# Patient Record
Sex: MALE | Race: WHITE | NOT HISPANIC OR LATINO | Employment: OTHER | URBAN - METROPOLITAN AREA
[De-identification: names, ages, dates, MRNs, and addresses within clinical notes are randomized per-mention and may not be internally consistent; named-entity substitution may affect disease eponyms.]

---

## 2017-04-18 DIAGNOSIS — F90.9 ADULT ADHD: ICD-10-CM

## 2017-04-18 DIAGNOSIS — F33.2 SEVERE RECURRENT MAJOR DEPRESSION WITHOUT PSYCHOTIC FEATURES (H): Primary | ICD-10-CM

## 2017-04-18 LAB
BASOPHILS # BLD AUTO: 0 10E9/L (ref 0–0.2)
BASOPHILS NFR BLD AUTO: 0.3 %
DIFFERENTIAL METHOD BLD: NORMAL
EOSINOPHIL # BLD AUTO: 0.1 10E9/L (ref 0–0.7)
EOSINOPHIL NFR BLD AUTO: 2.1 %
ERYTHROCYTE [DISTWIDTH] IN BLOOD BY AUTOMATED COUNT: 13.4 % (ref 10–15)
HCT VFR BLD AUTO: 45.6 % (ref 40–53)
HGB BLD-MCNC: 15.9 G/DL (ref 13.3–17.7)
LYMPHOCYTES # BLD AUTO: 2.1 10E9/L (ref 0.8–5.3)
LYMPHOCYTES NFR BLD AUTO: 35.7 %
MCH RBC QN AUTO: 30.8 PG (ref 26.5–33)
MCHC RBC AUTO-ENTMCNC: 34.9 G/DL (ref 31.5–36.5)
MCV RBC AUTO: 88 FL (ref 78–100)
MONOCYTES # BLD AUTO: 0.6 10E9/L (ref 0–1.3)
MONOCYTES NFR BLD AUTO: 9.6 %
NEUTROPHILS # BLD AUTO: 3.1 10E9/L (ref 1.6–8.3)
NEUTROPHILS NFR BLD AUTO: 52.3 %
PLATELET # BLD AUTO: 238 10E9/L (ref 150–450)
RBC # BLD AUTO: 5.17 10E12/L (ref 4.4–5.9)
WBC # BLD AUTO: 5.8 10E9/L (ref 4–11)

## 2017-04-18 PROCEDURE — 80053 COMPREHEN METABOLIC PANEL: CPT | Performed by: PSYCHIATRY & NEUROLOGY

## 2017-04-18 PROCEDURE — 85025 COMPLETE CBC W/AUTO DIFF WBC: CPT | Performed by: PSYCHIATRY & NEUROLOGY

## 2017-04-18 PROCEDURE — 80061 LIPID PANEL: CPT | Performed by: PSYCHIATRY & NEUROLOGY

## 2017-04-18 PROCEDURE — 36415 COLL VENOUS BLD VENIPUNCTURE: CPT | Performed by: PSYCHIATRY & NEUROLOGY

## 2017-04-18 PROCEDURE — 80156 ASSAY CARBAMAZEPINE TOTAL: CPT | Performed by: PSYCHIATRY & NEUROLOGY

## 2017-04-19 LAB
ALBUMIN SERPL-MCNC: 3.7 G/DL (ref 3.4–5)
ALP SERPL-CCNC: 83 U/L (ref 40–150)
ALT SERPL W P-5'-P-CCNC: 36 U/L (ref 0–70)
ANION GAP SERPL CALCULATED.3IONS-SCNC: 11 MMOL/L (ref 3–14)
AST SERPL W P-5'-P-CCNC: 19 U/L (ref 0–45)
BILIRUB SERPL-MCNC: 0.3 MG/DL (ref 0.2–1.3)
BUN SERPL-MCNC: 14 MG/DL (ref 7–30)
CALCIUM SERPL-MCNC: 9.1 MG/DL (ref 8.5–10.1)
CARBAMAZEPINE SERPL-MCNC: 6.8 MG/L (ref 4–12)
CHLORIDE SERPL-SCNC: 110 MMOL/L (ref 94–109)
CHOLEST SERPL-MCNC: 222 MG/DL
CO2 SERPL-SCNC: 23 MMOL/L (ref 20–32)
CREAT SERPL-MCNC: 1.12 MG/DL (ref 0.66–1.25)
GFR SERPL CREATININE-BSD FRML MDRD: 76 ML/MIN/1.7M2
GLUCOSE SERPL-MCNC: 100 MG/DL (ref 70–99)
HDLC SERPL-MCNC: 61 MG/DL
LDLC SERPL CALC-MCNC: 133 MG/DL
NONHDLC SERPL-MCNC: 161 MG/DL
POTASSIUM SERPL-SCNC: 4.2 MMOL/L (ref 3.4–5.3)
PROT SERPL-MCNC: 7.1 G/DL (ref 6.8–8.8)
SODIUM SERPL-SCNC: 144 MMOL/L (ref 133–144)
TRIGL SERPL-MCNC: 138 MG/DL

## 2017-09-08 DIAGNOSIS — N40.1 BENIGN NON-NODULAR PROSTATIC HYPERPLASIA WITH LOWER URINARY TRACT SYMPTOMS: Primary | ICD-10-CM

## 2017-09-08 RX ORDER — TAMSULOSIN HYDROCHLORIDE 0.4 MG/1
0.4 CAPSULE ORAL DAILY
Qty: 90 CAPSULE | Refills: 0 | Status: SHIPPED | OUTPATIENT
Start: 2017-09-08 | End: 2019-07-02

## 2017-09-08 NOTE — TELEPHONE ENCOUNTER
Signed Prescriptions:                        Disp   Refills    tamsulosin (FLOMAX) 0.4 MG capsule         90 cap*0        Sig: Take 1 capsule (0.4 mg) by mouth daily  Authorizing Provider: PRIYANKA RODRIGUEZ  Ordering User: FLORINDA STARR RN

## 2018-02-21 ENCOUNTER — OFFICE VISIT (OUTPATIENT)
Dept: FAMILY MEDICINE | Facility: CLINIC | Age: 33
End: 2018-02-21
Payer: MEDICAID

## 2018-02-21 VITALS
BODY MASS INDEX: 30.05 KG/M2 | DIASTOLIC BLOOD PRESSURE: 60 MMHG | WEIGHT: 163.3 LBS | OXYGEN SATURATION: 99 % | TEMPERATURE: 98.3 F | HEIGHT: 62 IN | HEART RATE: 125 BPM | SYSTOLIC BLOOD PRESSURE: 90 MMHG

## 2018-02-21 DIAGNOSIS — Z00.01 ENCOUNTER FOR GENERAL ADULT MEDICAL EXAMINATION WITH ABNORMAL FINDINGS: Primary | ICD-10-CM

## 2018-02-21 DIAGNOSIS — H10.9 CONJUNCTIVITIS OF BOTH EYES, UNSPECIFIED CONJUNCTIVITIS TYPE: ICD-10-CM

## 2018-02-21 DIAGNOSIS — N50.812 TESTICULAR PAIN, LEFT: ICD-10-CM

## 2018-02-21 PROCEDURE — 99395 PREV VISIT EST AGE 18-39: CPT | Performed by: FAMILY MEDICINE

## 2018-02-21 PROCEDURE — 99213 OFFICE O/P EST LOW 20 MIN: CPT | Mod: 25 | Performed by: FAMILY MEDICINE

## 2018-02-21 RX ORDER — HYDROXYZINE PAMOATE 50 MG/1
CAPSULE ORAL
Refills: 3 | COMMUNITY
Start: 2018-02-13 | End: 2022-08-23

## 2018-02-21 RX ORDER — ALFUZOSIN HYDROCHLORIDE 10 MG/1
TABLET, EXTENDED RELEASE ORAL
COMMUNITY
Start: 2018-02-18 | End: 2020-04-24

## 2018-02-21 RX ORDER — NEOMYCIN SULFATE, POLYMYXIN B SULFATE AND DEXAMETHASONE 3.5; 10000; 1 MG/ML; [USP'U]/ML; MG/ML
1 SUSPENSION/ DROPS OPHTHALMIC 4 TIMES DAILY
Qty: 1 BOTTLE | Refills: 0 | Status: SHIPPED | OUTPATIENT
Start: 2018-02-21 | End: 2020-04-24

## 2018-02-21 NOTE — NURSING NOTE
"Chief Complaint   Patient presents with     Physical     male health care        Initial BP 90/60 (BP Location: Right arm, Patient Position: Chair, Cuff Size: Adult Large)  Pulse 125  Temp 98.3  F (36.8  C) (Temporal)  Ht 5' 2\" (1.575 m)  Wt 163 lb 4.8 oz (74.1 kg)  SpO2 99%  BMI 29.87 kg/m2 Estimated body mass index is 29.87 kg/(m^2) as calculated from the following:    Height as of this encounter: 5' 2\" (1.575 m).    Weight as of this encounter: 163 lb 4.8 oz (74.1 kg).  Medication Reconciliation: complete     "

## 2018-02-21 NOTE — PROGRESS NOTES
"SUBJECTIVE:   CC: Brian Ortiz is an 32 year old male who presents for preventative health visit.     HPI  {Outside tests to abstract? :635072}    {additional problems to add (Optional):725480}    Today's PHQ-2 Score:   PHQ-2 ( 1999 Pfizer) 2/27/2015   Q1: Little interest or pleasure in doing things 2   Q2: Feeling down, depressed or hopeless 3   PHQ-2 Score 5       Abuse: Current or Past(Physical, Sexual or Emotional)- {YES/NO/NA:548969}  Do you feel safe in your environment - {YES/NO/NA:176913}    Social History   Substance Use Topics     Smoking status: Never Smoker     Smokeless tobacco: Never Used     Alcohol use No     No flowsheet data found.{add AUDIT responses (Optional) (A score of 7 for adult men is an indication of hazardous drinking; a score of 8 or more is an indication of an alcohol use disorder.  A score of 7 or more for adult women is an indication of hazardous drinking or an alchohol use disorder):155797}    Last PSA: No results found for: PSA    Reviewed orders with patient. Reviewed health maintenance and updated orders accordingly - {Yes/No:322517::\"Yes\"}  {Chronicprobdata (Optional):632306}    Reviewed and updated as needed this visit by clinical staff         Reviewed and updated as needed this visit by Provider        {HISTORY OPTIONS (Optional):413409}    Review of Systems  {MALE ROS-adult preventive care package:476185::\"C: NEGATIVE for fever, chills, change in weight\",\"I: NEGATIVE for worrisome rashes, moles or lesions\",\"E: NEGATIVE for vision changes or irritation\",\"ENT: NEGATIVE for ear, mouth and throat problems\",\"R: NEGATIVE for significant cough or SOB\",\"CV: NEGATIVE for chest pain, palpitations or peripheral edema\",\"GI: NEGATIVE for nausea, abdominal pain, heartburn, or change in bowel habits\",\" male: negative for dysuria, hematuria, decreased urinary stream, erectile dysfunction, urethral discharge\",\"M: NEGATIVE for significant arthralgias or myalgia\",\"N: NEGATIVE for " "weakness, dizziness or paresthesias\",\"P: NEGATIVE for changes in mood or affect\"}    OBJECTIVE:   There were no vitals taken for this visit.    Physical Exam  {Exam Choices:648256}    ASSESSMENT/PLAN:   {Diag Picklist:922289}    COUNSELING:   {MALE COUNSELING MESSAGES:031071::\"Reviewed preventive health counseling, as reflected in patient instructions\"}    {BP Counseling- Complete if BP >= 120/80  (Optional):819557}     reports that he has never smoked. He has never used smokeless tobacco.  {Tobacco Cessation -- Complete if patient is a smoker (Optional):980164}  Estimated body mass index is 28.53 kg/(m^2) as calculated from the following:    Height as of 11/18/15: 5' 2\" (1.575 m).    Weight as of 8/31/16: 156 lb (70.8 kg).   {Weight Management Plan (ACO) Complete if BMI is abnormal-  Ages 18-64  BMI >24.9.  Age 65+ with BMI <23 or >30 (Optional):491029}    Counseling Resources:  ATP IV Guidelines  Pooled Cohorts Equation Calculator  FRAX Risk Assessment  ICSI Preventive Guidelines  Dietary Guidelines for Americans, 2010  USDA's MyPlate  ASA Prophylaxis  Lung CA Screening    Duy Canseco MD  [facility]  "

## 2018-02-21 NOTE — PROGRESS NOTES
"  SUBJECTIVE:   CC: Brian Ortiz is an 32 year old male who presents for preventative health visit.     #1-Mattery eyes since before [place]. Patient states that he wakes up with both of his eyes crusted over.    #2-C/o pain in left testicle. Denies swelling. States that it has been present for \"years.\"     #3-Zit on top of head.     #4-Patient would like to be placed back on his tamsulosin. Doesn't care for the alfuzosin.     Healthy Habits:    Do you get at least three servings of calcium containing foods daily (dairy, green leafy vegetables, etc.)? yes    Amount of exercise or daily activities, outside of work: none    Problems taking medications regularly No    Medication side effects: No    Have you had an eye exam in the past two years? no    Do you see a dentist twice per year? no    Do you have sleep apnea, excessive snoring or daytime drowsiness?occasonal day time drowsiness.        As above    Today's PHQ-2 Score:   PHQ-2 ( 1999 Pfizer) 2/21/2018 2/27/2015   Q1: Little interest or pleasure in doing things 2 2   Q2: Feeling down, depressed or hopeless 2 3   PHQ-2 Score 4 5       Abuse: Current or Past(Physical, Sexual or Emotional)- No   Do you feel safe in your environment - Yes    Social History   Substance Use Topics     Smoking status: Never Smoker     Smokeless tobacco: Never Used     Alcohol use No      If you drink alcohol do you typically have >3 drinks per day or >7 drinks per week? Not Applicable                      Last PSA: No results found for: PSA    Reviewed orders with patient. 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 Tubes     SURGICAL HISTORY OF -   08/10/2004    Madibular midline ostectom;y with anterior ilac crest bone graft.  Bilateral sagittal split ramus osteotomy.  [facility]       ROS:  C: NEGATIVE for fever, chills, change in weight  I: NEGATIVE for worrisome rashes, moles or lesions  EYES: Complains of crusting in the morning.  No visual disturbance little bit of itching.  ENT: NEGATIVE for ear, mouth and throat problems  R: NEGATIVE for significant cough or SOB  CV: NEGATIVE for chest pain, palpitations or peripheral edema  GI: NEGATIVE for nausea, abdominal pain, heartburn, or change in bowel habits   male: Complains of left testicular pain.  Present for years.  No dysuria.  No known injury.  M: NEGATIVE for significant arthralgias or myalgia  N: NEGATIVE for weakness, dizziness or paresthesias  P: NEGATIVE for changes in mood or affect    OBJECTIVE:   There were no vitals taken for this visit.  EXAM:  GENERAL: healthy, alert and in facies a bit different  EYES: PERRL, EOMI and slight conjunctival injection  HENT: nose and mouth without ulcers or lesions, oropharynx clear, oral mucous membranes moist and hearing aids bilaterally  NECK: no adenopathy, no asymmetry, masses, or scars and thyroid normal to palpation  RESP: lungs clear to auscultation - no rales, rhonchi or wheezes  CV: regular rate and rhythm, normal S1 S2, no S3 or S4, no murmur, click or rub, no peripheral edema and peripheral pulses strong  ABDOMEN: soft, nontender, no hepatosplenomegaly, no masses and bowel sounds normal   (male): normal male genitalia without lesions or urethral discharge, no hernia  MS:  "no gross musculoskeletal defects noted, no edema  SKIN: no suspicious lesions or rashes  NEURO: Normal strength and tone, mentation intact and speech normal  PSYCH: affect normal/bright and mentally challenged just slightly.    ASSESSMENT/PLAN:   1. Encounter for general adult medical examination with abnormal findings  See below.    2. Conjunctivitis of both eyes, unspecified conjunctivitis type  We will try some Maxitrol eyedrops.  - neomycin-polymyxin-dexamethasone (MAXITROL) 3.5-35160-4.1 SUSP ophthalmic susp; Place 1 drop into both eyes 4 times daily  Dispense: 1 Bottle; Refill: 0    3. Testicular pain, left  He sees a urologist for urinary frequency and hesitation.  We will have him follow-up with him because he wants to change medication and he can bring up this testicular pain as well.  He is pleased with this.      COUNSELING:  Reviewed preventive health counseling, as reflected in patient instructions       Regular exercise       Healthy diet/nutrition       Vision screening       reports that he has never smoked. He has never used smokeless tobacco.    Estimated body mass index is 28.53 kg/(m^2) as calculated from the following:    Height as of 11/18/15: 5' 2\" (1.575 m).    Weight as of 8/31/16: 156 lb (70.8 kg).       Counseling Resources:  ATP IV Guidelines  Pooled Cohorts Equation Calculator  FRAX Risk Assessment  ICSI Preventive Guidelines  Dietary Guidelines for Americans, 2010  USDA's MyPlate  ASA Prophylaxis  Lung CA Screening    Duy Canseco MD  [facility]  "

## 2018-02-21 NOTE — MR AVS SNAPSHOT
After Visit Summary   2/21/2018    Brian Ortiz    MRN: [id number]           Patient Information     Date Of Birth          1985        Visit Information        Provider Department      2/21/2018 2:20 PM Duy Canseco MD [facility]        Today's Diagnoses     Encounter for general adult medical examination with abnormal findings    -  1    Conjunctivitis of both eyes, unspecified conjunctivitis type        Testicular pain, left          Care Instructions      Preventive Health Recommendations  Male Ages 26 - 39    Yearly exam:             See your health care provider every year in order to  o   Review health changes.   o   Discuss preventive care.    o   Review your medicines if your doctor has prescribed any.    You should be tested each year for STDs (sexually transmitted diseases), if you re at risk.     After age 35, talk to your provider about cholesterol testing. If you are at risk for heart disease, have your cholesterol tested at least every 5 years.     If you are at risk for diabetes, you should have a diabetes test (fasting glucose).  Shots: Get a flu shot each year. Get a tetanus shot every 10 years.     Nutrition:    Eat at least 5 servings of fruits and vegetables daily.     Eat whole-grain bread, whole-wheat pasta and brown rice instead of white grains and rice.     Talk to your provider about Calcium and Vitamin D.     Lifestyle    Exercise for at least 150 minutes a week (30 minutes a day, 5 days a week). This will help you control your weight and prevent disease.     Limit alcohol to one drink per day.     No smoking.     Wear sunscreen to prevent skin cancer.     See your dentist every six months for an exam and cleaning.             Follow-ups after your visit        Who to contact     If you have questions or need follow up information about today's clinic visit or your schedule please contact [facility] directly at  "[phone].  Normal or non-critical lab and imaging results will be communicated to you by [email or web address], letter or phone within 4 business days after the clinic has received the results. If you do not hear from us within 7 days, please contact the clinic through [employer] or phone. If you have a critical or abnormal lab result, we will notify you by phone as soon as possible.  Submit refill requests through [employer] or call your pharmacy and they will forward the refill request to us. Please allow 3 business days for your refill to be completed.          Additional Information About Your Visit        [email or web address] Information     [employer] gives you secure access to your electronic health record. If you see a primary care provider, you can also send messages to your care team and make appointments. If you have questions, please call your primary care clinic.  If you do not have a primary care provider, please call [phone] and they will assist you.        Care EveryWhere ID     This is your Care EveryWhere ID. This could be used by other organizations to access your [facility] medical records  [id number]        Your Vitals Were     Pulse Temperature Height Pulse Oximetry BMI (Body Mass Index)       125 98.3  F (36.8  C) (Temporal) 5' 2\" (1.575 m) 99% 29.87 kg/m2        Blood Pressure from Last 3 Encounters:   02/21/18 90/60   11/09/16 108/70   06/01/16 134/69    Weight from Last 3 Encounters:   02/21/18 163 lb 4.8 oz (74.1 kg)   08/31/16 156 lb (70.8 kg)   11/18/15 157 lb (71.2 kg)              We Performed the Following     OFFICE/OUTPT VISIT,EST,LEVL III          Today's Medication Changes          These changes are accurate as of 2/21/18  4:20 PM.  If you have any questions, ask your nurse or doctor.               Start taking these medicines.        Dose/Directions    neomycin-polymyxin-dexamethasone 3.5-49874-6.1 Susp ophthalmic susp   Commonly known as:  MAXITROL   Used for:  Conjunctivitis of both eyes, unspecified " conjunctivitis type   Started by:  Duy Canseco MD        Dose:  1 drop   Place 1 drop into both eyes 4 times daily   Quantity:  1 Bottle   Refills:  0            Where to get your medicines      These medications were sent to [id number] - [place], [place] - [address]  [address], [facility] [address]    Hours:  [facility] [place] - numbers unchanged   9/8/03 kr Phone:  [phone]     neomycin-polymyxin-dexamethasone 3.5-31462-1.1 Susp ophthalmic susp                Primary Care Provider Office Phone # Fax #    Duy Canseco [phone]       [facility] [address] [place] [address]        Equal Access to Services     MARY CUENCA : Hadii yenny ku hadasho Soomaali, waaxda luqadaha, qaybta kaalmada adeegyada, waxay joannain haydana damon . So [facility] [phone].    ATENCIÓN: Si habla español, tiene a li disposición servicios gratuitos de asistencia lingüística. Llame al [phone].    We comply with applicable federal civil rights laws and [place] laws. We do not discriminate on the basis of race, color, national origin, age, disability, sex, sexual orientation, or gender identity.            Thank you!     Thank you for choosing [facility]  for your care. Our goal is always to provide you with excellent care. Hearing back from our patients is one way we can continue to improve our services. Please take a few minutes to complete the written survey that you may receive in the mail after your visit with us. Thank you!             Your Updated Medication List - Protect others around you: Learn how to safely use, store and throw away your medicines at www.disposemymeds.org.          This list is accurate as of 2/21/18  4:20 PM.  Always use your most recent med list.                   Brand Name Dispense Instructions for use Diagnosis    alfuzosin 10 MG 24 hr tablet    UROXATRAL          * buPROPion 100 MG tablet    WELLBUTRIN    120  tablet    Take 1 tablet (100 mg) by mouth 2 times daily    Multiple congenital malformations, not elsewhere classified       * buPROPion 100 MG 12 hr tablet    WELLBUTRIN SR    60 tablet    TAKE ONE TABLET BY MOUTH TWICE A DAY EVERY 12 HOURS    Multiple congenital malformations, not elsewhere classified       hydrOXYzine 50 MG capsule    VISTARIL     TAKE ONE CAPSULE BY MOUTH TWICE A DAY AS NEEDED        IBUPROFEN PO      Take 400 mg by mouth every 8 hours as needed for moderate pain (headache)        neomycin-polymyxin-dexamethasone 3.5-79934-6.1 Susp ophthalmic susp    MAXITROL    1 Bottle    Place 1 drop into both eyes 4 times daily    Conjunctivitis of both eyes, unspecified conjunctivitis type       QUEtiapine 50 MG tablet    SEROQUEL    120 tablet    Take 1 tablet (50 mg) by mouth 2 times daily    Multiple congenital malformations, not elsewhere classified       STRATTERA 60 MG capsule   Generic drug:  atomoxetine     30    1 CAPSULE EVERY MORNING        tamsulosin 0.4 MG capsule    FLOMAX    90 capsule    Take 1 capsule (0.4 mg) by mouth daily    Benign non-nodular prostatic hyperplasia with lower urinary tract symptoms       * TEGRETOL 100 MG chewable tablet   Generic drug:  carBAMazepine     1 tablet    Take tablet daily. Unknown dose.        * carBAMazepine 200 MG 12 hr tablet    TEGretol XR    60 tablet    TAKE ONE TABLET BY MOUTH TWICE A DAY    Multiple congenital malformations, not elsewhere classified       * Notice:  This list has 4 medication(s) that are the same as other medications prescribed for you. Read the directions carefully, and ask your doctor or other care provider to review them with you.

## 2018-02-22 ASSESSMENT — PATIENT HEALTH QUESTIONNAIRE - PHQ9: SUM OF ALL RESPONSES TO PHQ QUESTIONS 1-9: 18

## 2019-07-02 ENCOUNTER — NURSE TRIAGE (OUTPATIENT)
Dept: NURSING | Facility: CLINIC | Age: 34
End: 2019-07-02

## 2019-07-02 ENCOUNTER — HOSPITAL ENCOUNTER (EMERGENCY)
Facility: CLINIC | Age: 34
Discharge: HOME | End: 2019-07-02
Attending: FAMILY MEDICINE | Admitting: FAMILY MEDICINE
Payer: MEDICAID

## 2019-07-02 VITALS
BODY MASS INDEX: 30.36 KG/M2 | SYSTOLIC BLOOD PRESSURE: 112 MMHG | HEART RATE: 96 BPM | OXYGEN SATURATION: 100 % | RESPIRATION RATE: 16 BRPM | DIASTOLIC BLOOD PRESSURE: 90 MMHG | WEIGHT: 166 LBS | TEMPERATURE: 98.2 F

## 2019-07-02 DIAGNOSIS — N40.1 BENIGN NON-NODULAR PROSTATIC HYPERPLASIA WITH LOWER URINARY TRACT SYMPTOMS: ICD-10-CM

## 2019-07-02 DIAGNOSIS — R39.15 URINARY URGENCY: ICD-10-CM

## 2019-07-02 DIAGNOSIS — R35.0 URINARY FREQUENCY: ICD-10-CM

## 2019-07-02 LAB
ALBUMIN UR-MCNC: NEGATIVE MG/DL
APPEARANCE UR: ABNORMAL
BILIRUB UR QL STRIP: NEGATIVE
COLOR UR AUTO: YELLOW
GLUCOSE UR STRIP-MCNC: 50 MG/DL
HGB UR QL STRIP: NEGATIVE
KETONES UR STRIP-MCNC: NEGATIVE MG/DL
LEUKOCYTE ESTERASE UR QL STRIP: NEGATIVE
MUCOUS THREADS #/AREA URNS LPF: PRESENT /LPF
NITRATE UR QL: NEGATIVE
PH UR STRIP: 5 PH (ref 5–7)
RBC #/AREA URNS AUTO: <1 /HPF (ref 0–2)
SOURCE: ABNORMAL
SP GR UR STRIP: 1.02 (ref 1–1.03)
SPERM #/AREA URNS HPF: PRESENT /HPF
UROBILINOGEN UR STRIP-MCNC: 0 MG/DL (ref 0–2)
WBC #/AREA URNS AUTO: 1 /HPF (ref 0–5)

## 2019-07-02 PROCEDURE — 99284 EMERGENCY DEPT VISIT MOD MDM: CPT | Mod: 25 | Performed by: FAMILY MEDICINE

## 2019-07-02 PROCEDURE — 99284 EMERGENCY DEPT VISIT MOD MDM: CPT | Mod: Z6 | Performed by: FAMILY MEDICINE

## 2019-07-02 PROCEDURE — 81001 URINALYSIS AUTO W/SCOPE: CPT | Performed by: FAMILY MEDICINE

## 2019-07-02 PROCEDURE — 51798 US URINE CAPACITY MEASURE: CPT | Performed by: FAMILY MEDICINE

## 2019-07-02 RX ORDER — TAMSULOSIN HYDROCHLORIDE 0.4 MG/1
0.4 CAPSULE ORAL DAILY
Qty: 30 CAPSULE | Refills: 0 | Status: SHIPPED | OUTPATIENT
Start: 2019-07-02 | End: 2022-08-23

## 2019-07-02 NOTE — ED AVS SNAPSHOT
[facility]  [address] [place] [address]  Phone:  [phone]  Fax:  [phone]                                    Brian Ortiz   MRN: [id number]    Department:  [facility]   Date of Visit:  7/2/2019           After Visit Summary Signature Page    I have received my discharge instructions, and my questions have been answered. I have discussed any challenges I see with this plan with the nurse or doctor.    ..........................................................................................................................................  Patient/Patient Representative Signature      ..........................................................................................................................................  Patient Representative Print Name and Relationship to Patient    ..................................................               ................................................  Date                                   Time    ..........................................................................................................................................  Reviewed by Signature/Title    ...................................................              ..............................................  Date                                               Time          22EPIC Rev 08/18

## 2019-07-03 NOTE — ED TRIAGE NOTES
"Patient answered yes to feeling down, depressed or hopeless and he has thoughts \"every day\" of killing himself. His last suicide attempt was 2016. He has a therapist and a psychiatrist.  "

## 2019-07-03 NOTE — ED PROVIDER NOTES
"  History     Chief Complaint   Patient presents with     Dysuria     HPI  Brian Ortiz is a 33 year old male who presents to the ED with a 2-month history of urinary urgency.  He feels like he does not completely empty his bladder and does have urinary frequency.  He has had to have urological procedures in the past sounds like cystocopy, not sure if he had any dilatations but has been putting off going back.  There is usually something better to do on any given day then to have his penis instrumented.  Also has had kidney stones but is also always been able to pass them on his own.  Every couple of months he will hear a \"kink\" in the toilet.  Does not sound like he has true dysuria but he does have discomfort while urinating and during midstream will get an even stronger urge area denies any fevers chills or sweats.    Also had some itching in his left flank in the region of his solitary kidney.  He states that it feels like the \"epicenter\" of the itching is deep inside under his ribs near his kidney.  He has not noticed any rash in this area.  He did have chickenpox as a kid.    When asked in triage, he did admit to passive, fleeting thoughts of the concept of suicide but no active suicidal ideation or plan.  He saw his therapist yesterday and has an appointment with his medical psychiatrist tomorrow.  He is not worried about this and this is nothing new.    Allergies:  Allergies   Allergen Reactions     No Known Drug Allergies        Problem List:    Patient Active Problem List    Diagnosis Date Noted     Benign non-nodular prostatic hyperplasia with lower urinary tract symptoms 08/31/2016     Priority: Medium     Hearing decreased 04/22/2015     Priority: Medium     BPPV (benign paroxysmal positional vertigo) 04/22/2015     Priority: Medium     Anal fissure 05/06/2011     Priority: Medium     Acne 05/06/2011     Priority: Medium     CARDIOVASCULAR SCREENING; LDL GOAL LESS THAN 160 10/31/2010     Priority: " Medium     Constipation 01/09/2008     Priority: Medium     Problem list name updated by automated process. Provider to review       Scoliosis associated with other condition 01/14/2005     Priority: Medium     Hearing loss 01/14/2005     Priority: Medium     Problem list name updated by automated process. Provider to review       Multiple congenital malformations, not elsewhere classified 01/14/2005     Priority: Medium     Diagnosis updated by automated process. Provider to review and confirm.          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 Tubes     SURGICAL HISTORY OF -   08/10/2004    Madibular midline ostectom;y with anterior ilac crest bone graft.  Bilateral sagittal split ramus osteotomy.  [facility]       Family History:    Family History   Adopted: Yes   Problem Relation Age of Onset     Substance Abuse Mother      Substance Abuse Father        Social History:  Marital Status:  Single [1]  Social History     Tobacco Use     Smoking status: Never Smoker     Smokeless tobacco: Never Used   Substance Use Topics     Alcohol use: No     Alcohol/week: 0.0 oz     Drug use: No        Medications:      tamsulosin (FLOMAX) 0.4 MG capsule   alfuzosin (UROXATRAL) 10 MG 24 hr tablet   buPROPion (WELLBUTRIN SR) 100 MG 12 hr tablet   buPROPion (WELLBUTRIN) 100 MG tablet   carBAMazepine (TEGRETOL XR) 200 MG 12 hr tablet   carBAMazepine (TEGRETOL) 100 MG chewable tablet   hydrOXYzine (VISTARIL) 50 MG capsule   IBUPROFEN PO   neomycin-polymyxin-dexamethasone (MAXITROL) 3.5-20293-1.1  SUSP ophthalmic susp   QUEtiapine (SEROQUEL) 50 MG tablet   STRATTERA 60 MG OR CAPS         Review of Systems   All other systems reviewed and are negative.      Physical Exam   BP: 112/90  Pulse: 127  Temp: 98.2  F (36.8  C)  Resp: 16  Weight: 75.3 kg (166 lb)  SpO2: 100 %      Physical Exam   Constitutional: He is oriented to person, place, and time. He appears well-developed and well-nourished. No distress.   HENT:   Hearing Aid on left     Pulmonary/Chest: Effort normal. No respiratory distress.   Abdominal: There is no CVA tenderness.   Neurological: He is alert and oriented to person, place, and time.   Skin: Skin is warm and dry. No rash noted.       ED Course  (with Medical Decision Making)    33-year-old with solitary kidney from a birth.  Has had kidney stones and sounds like he has had cystoscopy in the past.  Has had urinary frequency and incomplete emptying over the past couple of months.  No fevers chills or sweats.      UA was clear.  He tells me he was previously on Flomax it worked very well but his prescription ran out any has not been back in to see his urologist to get it filled.  I told him I would give him a 30-day supply which should give him enough time to get him to see his urologist and incentive to do so.  We discussed reportable signs and when to return.  He is comfortable with this plan.          Procedures               Critical Care time:  none               Results for orders placed or performed during the hospital encounter of 07/02/19 (from the past 24 hour(s))   UA with Microscopic   Result Value Ref Range    Color Urine Yellow     Appearance Urine Slightly Cloudy     Glucose Urine 50 (A) NEG^Negative mg/dL    Bilirubin Urine Negative NEG^Negative    Ketones Urine Negative NEG^Negative mg/dL    Specific Gravity Urine 1.023 1.003 - 1.035    Blood Urine Negative NEG^Negative    pH Urine 5.0 5.0 - 7.0 pH    Protein Albumin Urine Negative NEG^Negative mg/dL    Urobilinogen mg/dL 0.0  0.0 - 2.0 mg/dL    Nitrite Urine Negative NEG^Negative    Leukocyte Esterase Urine Negative NEG^Negative    Source Midstream Urine     WBC Urine 1 0 - 5 /HPF    RBC Urine <1 0 - 2 /HPF    Mucous Urine Present (A) NEG^Negative /LPF    sperm Present (A) NEG^Negative /HPF       Medications - No data to display    Assessments & Plan     I have reviewed the nursing notes.    I have reviewed the findings, diagnosis, plan and need for follow up with the patient.          Medication List      There are no discharge medications for this visit.         Final diagnoses:   Urinary frequency   Urinary urgency       7/2/2019   [facility] EMERGENCY DEPARTMENT     Jesus Smith MD  07/03/19 0107       Jesus Smith MD  07/03/19 [id number]

## 2019-07-03 NOTE — DISCHARGE INSTRUCTIONS
Since the Flomax worked well for you in the past, we will will go ahead and restart that.  You can take 0.4 mg at bedtime.  I gave you 30-day supply.  That should give you time to get in to see your urologist for follow-up.  Your urine was clear tonight and showed no signs of infection.  It was a pleasure visiting with you and your father tonight.  I hope this settles down quickly for you.  If you develop a blistery rash in the area of itching, please return as you could have early shingles.    Thank you for choosing [facility]. We appreciate the opportunity to meet your urgent medical needs. Please let us know if we could have done anything to make your stay more satisfying.    After discharge, please closely monitor for any new or worsening symptoms. Return to the Emergency Department if you develop any acute worsening signs or symptoms.    If you had lab work, cultures or imaging studies done during your stay, the final results may still be pending. We will call you if your plan of care needs to change. However, if you are not improving as expected, please follow up with your primary care provider or clinic.     Start any prescription medications that were prescribed to you and take them as directed.     Please see additional handouts that may be pertinent to your condition.

## 2019-07-03 NOTE — TELEPHONE ENCOUNTER
"Patient's \"kidney\" is bothering him 2/10 and has a \"deep itching.\"  Also having pain when urinates off and on.  Guidelines say should see provider within four hours.  Patient does not have a ride, but will try to find one.    Huong Núñez RN  [facility]      Reason for Disposition    Pain or burning with urination    Additional Information    Negative: Passed out (i.e., lost consciousness, collapsed and was not responding)    Negative: Shock suspected (e.g., cold/pale/clammy skin, too weak to stand, low BP, rapid pulse)    Negative: Difficult to awaken or acting confused (e.g., disoriented, slurred speech)    Negative: Sounds like a life-threatening emergency to the triager    Negative: [1] SEVERE pain (e.g., excruciating, scale 8-10) AND [2] present > 1 hour    Negative: [1] SEVERE pain (e.g., excruciating, scale 8-10) AND [2] not improved after pain medicine    Negative: [1] Sudden onset of severe flank pain AND [2] age > 60    Negative: [1] Abdominal pain AND [2] age > 60    Negative: [1] Unable to urinate (or only a few drops) > 4 hours AND     [2] bladder feels very full (e.g., palpable bladder or strong urge to urinate)    Negative: Vomiting    Negative: Weakness of a leg or foot (e.g., unable to bear weight, dragging foot)    Negative: Patient sounds very sick or weak to the triager    Negative: Fever > 100.5 F (38.1 C)    Protocols used: FLANK PAIN-A-AH      "

## 2019-07-03 NOTE — ED TRIAGE NOTES
Patient states he has had painful urination, difficulty starting a stream, and incomplete emptying x2 months. Today has flank pain. He hasn't seen his urologist x2 years. He is concerned because he has 1 kidney.

## 2019-09-12 ENCOUNTER — NURSE TRIAGE (OUTPATIENT)
Dept: FAMILY MEDICINE | Facility: CLINIC | Age: 34
End: 2019-09-12

## 2019-09-12 NOTE — TELEPHONE ENCOUNTER
Reason for call:  Patient reporting a symptom    Symptom or request: pt stated when he blows his nose he has blood on the kleenex    Duration (how long have symptoms been present): 2 wks    Have you been treated for this before? No    Additional comments: pt doesn't want to make an appt.. (Pt is chuck'd for a pe 9/23 with [facility].) Pt is looking for home remedies at this time.    Phone Number patient can be reached at:  Home number on file [phone] (home)    Best Time:      Can we leave a detailed message on this number:  YES    Call taken on 9/12/2019 at 3:08 PM by Anitha Branham

## 2019-09-12 NOTE — TELEPHONE ENCOUNTER
"S-(situation): pt wondering if needs to be concerned about seeing some blood on his kleenex tissue a couple of times this past 2 weeks?    B-(background): He does has seasonal allergies issues. He takes an anti-histamine prn. .    A-(assessment): nose bleed of unknown etiology     R-(recommendations):  Continue antihistamine per usual as Follow-up if you get 3 nosebleeds in 48 hours. ......RICKEY Sarmiento      Additional Information    [1] Mild-moderate nosebleed AND [2] bleeding stopped now     NOt really a nosebleed, just some blood on the kleenex when he blows his nose at times.    Answer Assessment - Initial Assessment Questions  1. AMOUNT OF BLEEDING: \"How bad is the bleeding?\" \"How much blood was lost?\" \"Has the bleeding stopped?\"    - MILD: needed a couple tissues    - MODERATE: needed many tissues    - SEVERE: large blood clots, soaked many tissues, lasted more than 30 minutes       MILD, noticed a little blood on the kleenex a couple of times in the past 2 weeks.   2. ONSET: \"When did the nosebleed start?\"       It wasn't a nose bleed.  3. FREQUENCY: \"How many nosebleeds have you had in the last 24 hours?\"        NONE  4. RECURRENT SYMPTOMS: \"Have there been other recent nosebleeds?\" If so, ask: \"How long did it take you to stop the bleeding?\" \"What worked best?\"        NO.   5. CAUSE: \"What do you think caused this nosebleed?\"       NA  6. LOCAL FACTORS: \"Do you have any cold symptoms?\", \"Have you been rubbing or picking at your nose?\"      Possible allergies. .  7. SYSTEMIC FACTORS: \"Do you have high blood pressure or any bleeding problems?\"      NA  8. BLOOD THINNERS: \"Do you take any blood thinners?\" (e.g., coumadin, heparin, aspirin, Plavix)      He takes antihistamines prn   9. OTHER SYMPTOMS: \"Do you have any other symptoms?\" (e.g., lightheadedness)       NONE  10. PREGNANCY: \"Is there any chance you are pregnant?\" \"When was your last menstrual period?\"         NA    Protocols used: NOSEBLEED-A-AH    "

## 2019-09-28 ENCOUNTER — HEALTH MAINTENANCE LETTER (OUTPATIENT)
Age: 34
End: 2019-09-28

## 2019-10-02 ENCOUNTER — OFFICE VISIT (OUTPATIENT)
Dept: FAMILY MEDICINE | Facility: CLINIC | Age: 34
End: 2019-10-02
Payer: MEDICAID

## 2019-10-02 VITALS
OXYGEN SATURATION: 99 % | DIASTOLIC BLOOD PRESSURE: 60 MMHG | HEIGHT: 62 IN | BODY MASS INDEX: 31.63 KG/M2 | TEMPERATURE: 98.5 F | WEIGHT: 171.9 LBS | HEART RATE: 120 BPM | RESPIRATION RATE: 16 BRPM | SYSTOLIC BLOOD PRESSURE: 108 MMHG

## 2019-10-02 DIAGNOSIS — R21 RASH AND NONSPECIFIC SKIN ERUPTION: ICD-10-CM

## 2019-10-02 DIAGNOSIS — Z00.00 ROUTINE GENERAL MEDICAL EXAMINATION AT A HEALTH CARE FACILITY: Primary | ICD-10-CM

## 2019-10-02 DIAGNOSIS — Q89.7 MULTIPLE CONGENITAL MALFORMATIONS, NOT ELSEWHERE CLASSIFIED: ICD-10-CM

## 2019-10-02 DIAGNOSIS — H90.3 SENSORINEURAL HEARING LOSS (SNHL) OF BOTH EARS: ICD-10-CM

## 2019-10-02 PROCEDURE — 99395 PREV VISIT EST AGE 18-39: CPT | Performed by: FAMILY MEDICINE

## 2019-10-02 RX ORDER — TRIAMCINOLONE ACETONIDE 1 MG/G
CREAM TOPICAL 2 TIMES DAILY
Qty: 45 G | Refills: 0 | Status: SHIPPED | OUTPATIENT
Start: 2019-10-02 | End: 2020-04-24

## 2019-10-02 RX ORDER — ILOPERIDONE 6 MG/1
6 TABLET ORAL DAILY
COMMUNITY
Start: 2019-09-16 | End: 2022-08-23 | Stop reason: DRUGHIGH

## 2019-10-02 RX ORDER — BUPROPION HYDROCHLORIDE 150 MG/1
150 TABLET, EXTENDED RELEASE ORAL 2 TIMES DAILY
COMMUNITY
End: 2022-08-23

## 2019-10-02 ASSESSMENT — ENCOUNTER SYMPTOMS
COUGH: 0
NUMBNESS: 0
HEADACHES: 0
NAUSEA: 1
PHOTOPHOBIA: 0
HEMATURIA: 0
SORE THROAT: 1
APPETITE CHANGE: 0
VOMITING: 1
MYALGIAS: 0
JOINT SWELLING: 0
SPEECH DIFFICULTY: 0
CHOKING: 1
EYE PAIN: 0
PARESTHESIAS: 0
CHILLS: 0
WOUND: 0
TROUBLE SWALLOWING: 1
APNEA: 0
SLEEP DISTURBANCE: 0
FEVER: 0
HALLUCINATIONS: 0
CONFUSION: 0
ARTHRALGIAS: 0
DIFFICULTY URINATING: 1
RECTAL PAIN: 0
WEAKNESS: 0
FLANK PAIN: 0
HEARTBURN: 0
POLYPHAGIA: 0
HYPERACTIVE: 0
ADENOPATHY: 0
SEIZURES: 0
DYSURIA: 0
NERVOUS/ANXIOUS: 0
DECREASED CONCENTRATION: 0
EYE DISCHARGE: 0
NECK STIFFNESS: 0
CHEST TIGHTNESS: 0
BRUISES/BLEEDS EASILY: 0
FREQUENCY: 1
EYE REDNESS: 0
SHORTNESS OF BREATH: 0
AGITATION: 0
UNEXPECTED WEIGHT CHANGE: 0
FACIAL SWELLING: 0
DIZZINESS: 1
EYE ITCHING: 0
ANAL BLEEDING: 0
SINUS PAIN: 0
FACIAL ASYMMETRY: 0
WHEEZING: 0
RHINORRHEA: 0
PALPITATIONS: 0
CONSTIPATION: 1
DIAPHORESIS: 0
VOICE CHANGE: 0
FATIGUE: 0
DIARRHEA: 0
HEMATOCHEZIA: 0
ACTIVITY CHANGE: 0
TREMORS: 0
LIGHT-HEADEDNESS: 0
POLYDIPSIA: 0
BACK PAIN: 1
NECK PAIN: 0
SINUS PRESSURE: 0
ABDOMINAL DISTENTION: 0
DYSPHORIC MOOD: 0
COLOR CHANGE: 0
STRIDOR: 0
ABDOMINAL PAIN: 0

## 2019-10-02 ASSESSMENT — PATIENT HEALTH QUESTIONNAIRE - PHQ9: SUM OF ALL RESPONSES TO PHQ QUESTIONS 1-9: 15

## 2019-10-02 ASSESSMENT — MIFFLIN-ST. JEOR: SCORE: 1598.98

## 2019-10-02 NOTE — LETTER
My Depression Action Plan  Name: Brian Ortiz   Date of Birth 1985  Date: 10/2/2019    My doctor: Duy Canseco   My clinic: [address] [address] [phone]          GREEN    ZONE   Good Control    What it looks like:     Things are going generally well. You have normal up s and down s. You may even feel depressed from time to time, but bad moods usually last less than a day.   What you need to do:  1. Continue to care for yourself (see self care plan)  2. Check your depression survival kit and update it as needed  3. Follow your physician s recommendations including any medication.  4. Do not stop taking medication unless you consult with your physician first.           YELLOW         ZONE Getting Worse    What it looks like:     Depression is starting to interfere with your life.     It may be hard to get out of bed; you may be starting to isolate yourself from others.    Symptoms of depression are starting to last most all day and this has happened for several days.     You may have suicidal thoughts but they are not constant.   What you need to do:     1. Call your care team, your response to treatment will improve if you keep your care team informed of your progress. Yellow periods are signs an adjustment may need to be made.     2. Continue your self-care, even if you have to fake it!    3. Talk to someone in your support network    4. Open up your depression survival kit           RED    ZONE Medical Alert - Get Help    What it looks like:     Depression is seriously interfering with your life.     You may experience these or other symptoms: You can t get out of bed most days, can t work or engage in other necessary activities, you have trouble taking care of basic hygiene, or basic responsibilities, thoughts of suicide or death that will not go away, self-injurious behavior.     What you need to do:  1. Call your care team and  request a same-day appointment. If they are not available (weekends or after hours) call your local crisis line, emergency room or 911.            Depression Self Care Plan / Survival Kit    Self-Care for Depression  Here s the deal. Your body and mind are really not as separate as most people think.  What you do and think affects how you feel and how you feel influences what you do and think. This means if you do things that people who feel good do, it will help you feel better.  Sometimes this is all it takes.  There is also a place for medication and therapy depending on how severe your depression is, so be sure to consult with your medical provider and/ or Behavioral Health Consultant if your symptoms are worsening or not improving.     In order to better manage my stress, I will:    Exercise  Get some form of exercise, every day. This will help reduce pain and release endorphins, the  feel good  chemicals in your brain. This is almost as good as taking antidepressants!  This is not the same as joining a gym and then never going! (they count on that by the way ) It can be as simple as just going for a walk or doing some gardening, anything that will get you moving.      Hygiene   Maintain good hygiene (Get out of bed in the morning, Make your bed, Brush your teeth, Take a shower, and Get dressed like you were going to work, even if you are unemployed).  If your clothes don't fit try to get ones that do.    Diet  I will strive to eat foods that are good for me, drink plenty of water, and avoid excessive sugar, caffeine, alcohol, and other mood-altering substances.  Some foods that are helpful in depression are: complex carbohydrates, B vitamins, flaxseed, fish or fish oil, fresh fruits and vegetables.    Psychotherapy  I agree to participate in Individual Therapy (if recommended).    Medication  If prescribed medications, I agree to take them.  Missing doses can result in serious side effects.  I understand that  drinking alcohol, or other illicit drug use, may cause potential side effects.  I will not stop my medication abruptly without first discussing it with my provider.    Staying Connected With Others  I will stay in touch with my friends, family members, and my primary care provider/team.    Use your imagination  Be creative.  We all have a creative side; it doesn t matter if it s oil painting, sand castles, or mud pies! This will also kick up the endorphins.    Witness Beauty  (AKA stop and smell the roses) Take a look outside, even in mid-winter. Notice colors, textures. Watch the squirrels and birds.     Service to others  Be of service to others.  There is always someone else in need.  By helping others we can  get out of ourselves  and remember the really important things.  This also provides opportunities for practicing all the other parts of the program.    Humor  Laugh and be silly!  Adjust your TV habits for less news and crime-drama and more comedy.    Control your stress  Try breathing deep, massage therapy, biofeedback, and meditation. Find time to relax each day.     My support system    Clinic Contact:  Phone number:    Contact 1:  Phone number:    Contact 2:  Phone number:    Mandaen/:  Phone number:    Therapist:  Phone number:    Local crisis center:    Phone number:    Other community support:  Phone number:
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
## 2019-10-17 ENCOUNTER — TRANSFERRED RECORDS (OUTPATIENT)
Dept: HEALTH INFORMATION MANAGEMENT | Facility: CLINIC | Age: 34
End: 2019-10-17

## 2019-11-08 ENCOUNTER — OFFICE VISIT (OUTPATIENT)
Dept: FAMILY MEDICINE | Facility: CLINIC | Age: 34
End: 2019-11-08
Payer: MEDICAID

## 2019-11-08 VITALS
WEIGHT: 174.2 LBS | TEMPERATURE: 98.5 F | DIASTOLIC BLOOD PRESSURE: 64 MMHG | BODY MASS INDEX: 32.06 KG/M2 | HEIGHT: 62 IN | OXYGEN SATURATION: 98 % | SYSTOLIC BLOOD PRESSURE: 108 MMHG | RESPIRATION RATE: 12 BRPM | HEART RATE: 122 BPM

## 2019-11-08 DIAGNOSIS — Z23 NEED FOR PROPHYLACTIC VACCINATION AND INOCULATION AGAINST INFLUENZA: ICD-10-CM

## 2019-11-08 DIAGNOSIS — K21.00 GASTROESOPHAGEAL REFLUX DISEASE WITH ESOPHAGITIS: ICD-10-CM

## 2019-11-08 DIAGNOSIS — J01.00 ACUTE NON-RECURRENT MAXILLARY SINUSITIS: Primary | ICD-10-CM

## 2019-11-08 PROCEDURE — 99214 OFFICE O/P EST MOD 30 MIN: CPT | Mod: 25 | Performed by: FAMILY MEDICINE

## 2019-11-08 PROCEDURE — 90471 IMMUNIZATION ADMIN: CPT | Performed by: FAMILY MEDICINE

## 2019-11-08 PROCEDURE — 90686 IIV4 VACC NO PRSV 0.5 ML IM: CPT | Performed by: FAMILY MEDICINE

## 2019-11-08 RX ORDER — PANTOPRAZOLE SODIUM 40 MG/1
40 TABLET, DELAYED RELEASE ORAL DAILY
Qty: 30 TABLET | Refills: 3 | Status: SHIPPED | OUTPATIENT
Start: 2019-11-08 | End: 2020-02-21

## 2019-11-08 RX ORDER — AMOXICILLIN 875 MG
875 TABLET ORAL 2 TIMES DAILY
Qty: 20 TABLET | Refills: 0 | Status: SHIPPED | OUTPATIENT
Start: 2019-11-08 | End: 2020-04-24

## 2019-11-08 ASSESSMENT — MIFFLIN-ST. JEOR: SCORE: 1609.42

## 2019-11-08 NOTE — PROGRESS NOTES
"Subjective     Brian Ortiz is a 33 year old male who presents to clinic today for the following health issues:    HPI     Patient has been experiencing occasional bloody mucus from his nose. This has also been going on for a couple of months.     He also complains of heartburn.    Eye(s) Problem  Onset: \" a few months\"    Description:   Location: bilateral  Pain: YES- sometimes  Redness: no    Accompanying Signs & Symptoms:  Discharge/mattering: YES- patient is whipping away \"ashli\" stuff from his eyes.   Swelling: no  Visual changes: no  Fever: no  Nasal Congestion: no  Bothered by bright lights: no    History:   Trauma: no   Foreign body exposure: no     Precipitating factors:   Wearing contacts: no     Alleviating factors:  Improved by: nothing     Therapies Tried and outcome: nothing     SUBJECTIVE:  Brian  is a 34 year old male who presents for: Congestion and some mattering in his eyes at times he has some bloody mucus from his nose.  Somewhat of a difficult historian he has congenital anomalies and learning disorder.  He is also complaining of GERD symptoms.  No fever or cough.  His admit to history of some sinus issues.    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 Tubes     SURGICAL HISTORY OF -   08/10/2004    Madibular midline ostectom;y with anterior ilac crest bone graft.  Bilateral sagittal split ramus osteotomy.  [facility]     Social History     Tobacco Use     Smoking status: Never " "Smoker     Smokeless tobacco: Never Used   Substance Use Topics     Alcohol use: No     Alcohol/week: 0.0 standard drinks     Current Outpatient Medications   Medication Sig Dispense Refill     alfuzosin (UROXATRAL) 10 MG 24 hr tablet        amoxicillin (AMOXIL) 875 MG tablet Take 1 tablet (875 mg) by mouth 2 times daily for 10 days 20 tablet 0     buPROPion (WELLBUTRIN SR) 150 MG 12 hr tablet Take 150 mg by mouth 2 times daily       carBAMazepine (TEGRETOL XR) 200 MG 12 hr tablet TAKE ONE TABLET BY MOUTH TWICE A DAY 60 tablet 0     carBAMazepine (TEGRETOL) 100 MG chewable tablet Take tablet daily. Unknown dose. 1 tablet 0     FANAPT 6 MG TABS tablet Take 6 mg by mouth daily Take 1/2 tab by mouth daily       hydrOXYzine (VISTARIL) 50 MG capsule TAKE ONE CAPSULE BY MOUTH TWICE A DAY AS NEEDED  3     IBUPROFEN PO Take 400 mg by mouth every 8 hours as needed for moderate pain (headache)       pantoprazole (PROTONIX) 40 MG EC tablet Take 1 tablet (40 mg) by mouth daily 30 tablet 3     STRATTERA 60 MG OR CAPS 1 CAPSULE EVERY MORNING 30 0     tamsulosin (FLOMAX) 0.4 MG capsule Take 1 capsule (0.4 mg) by mouth daily 30 capsule 0     triamcinolone (KENALOG) 0.1 % external cream Apply topically 2 times daily 45 g 0     neomycin-polymyxin-dexamethasone (MAXITROL) 3.5-41004-6.1 SUSP ophthalmic susp Place 1 drop into both eyes 4 times daily (Patient not taking: Reported on 10/2/2019) 1 Bottle 0     QUEtiapine (SEROQUEL) 50 MG tablet Take 1 tablet (50 mg) by mouth 2 times daily (Patient not taking: Reported on 10/2/2019) 120 tablet 1       REVIEW OF SYSTEMS:   5 point ROS negative except as noted above in HPI, including Gen., Resp, CV, GI &  system review.     OBJECTIVE:  Vitals: /64 (BP Location: Left arm, Patient Position: Sitting, Cuff Size: Adult Large)   Pulse 122   Temp 98.5  F (36.9  C) (Temporal)   Resp 12   Ht 1.575 m (5' 2\")   Wt 79 kg (174 lb 3.2 oz)   SpO2 98%   BMI 31.86 kg/m    BMI= Body mass index " is 31.86 kg/m .  He is alert and appears in no distress.  Nose with some congestion.  Eyes are normal-appearing.  Throat with little drainage posteriorly.  Neck is supple no adenopathy.  Lungs are clear.  Heart with a regular rhythm.  Abdomen soft bowel sounds present perhaps slight epigastric tenderness to deep palpation.    ASSESSMENT:  #1 sinusitis #2 GERD    PLAN:  We will put him on some amoxicillin.  He is also going to be put on Protonix 40 mg a day.  See if this helps.  Given a flu shot today.  Follow-up if not improving.  I believe his eyes are result of perhaps some tear duct blockage.    Weight management plan: Discussed healthy diet and exercise guidelines    Duy Canseco MD  [facility]

## 2019-11-22 ENCOUNTER — TRANSFERRED RECORDS (OUTPATIENT)
Dept: HEALTH INFORMATION MANAGEMENT | Facility: CLINIC | Age: 34
End: 2019-11-22

## 2019-12-19 ENCOUNTER — TELEPHONE (OUTPATIENT)
Dept: FAMILY MEDICINE | Facility: CLINIC | Age: 34
End: 2019-12-19

## 2019-12-19 DIAGNOSIS — J01.01 ACUTE RECURRENT MAXILLARY SINUSITIS: Primary | ICD-10-CM

## 2019-12-19 RX ORDER — AMOXICILLIN 875 MG
875 TABLET ORAL 2 TIMES DAILY
Qty: 20 TABLET | Refills: 0 | Status: SHIPPED | OUTPATIENT
Start: 2019-12-19 | End: 2020-04-24

## 2019-12-19 NOTE — TELEPHONE ENCOUNTER
Reason for Call:  Other prescription    Detailed comments: pt feels his rx for amoxicillin wasn't long enough. Still having sinus symptoms. Please call.    Phone Number Patient can be reached at: Home number on file [phone] (home)    Best Time:     Can we leave a detailed message on this number? YES    Call taken on 12/19/2019 at 3:21 PM by Anitha Branham

## 2020-02-20 DIAGNOSIS — K21.00 GASTROESOPHAGEAL REFLUX DISEASE WITH ESOPHAGITIS: ICD-10-CM

## 2020-02-21 RX ORDER — PANTOPRAZOLE SODIUM 40 MG/1
TABLET, DELAYED RELEASE ORAL
Qty: 30 TABLET | Refills: 5 | Status: SHIPPED | OUTPATIENT
Start: 2020-02-21 | End: 2020-08-06

## 2020-02-21 NOTE — TELEPHONE ENCOUNTER
"Pantoprazole  Last Written Prescription Date:  11/8/2019  Last Fill Quantity: 30,  # refills: 3   Last office visit: 11/8/2019 with prescribing provider:  Ajith  Future Office Visit:  None    Requested Prescriptions   Pending Prescriptions Disp Refills     PANTOPRAZOLE 40 MG PO EC tablet [Pharmacy Med Name: PANTOPRAZOLE 40MG TAB] 30 tablet 3     Sig: TAKE 1 TABLET BY MOUTH DAILY       PPI Protocol Passed - 2/20/2020  3:26 PM        Passed - Not on Clopidogrel (unless Pantoprazole ordered)        Passed - No diagnosis of osteoporosis on record        Passed - Recent (12 mo) or future (30 days) visit within the authorizing provider's specialty     Patient has had an office visit with the authorizing provider or a provider within the authorizing providers department within the previous 12 mos or has a future within next 30 days. See \"Patient Info\" tab in inbasket, or \"Choose Columns\" in Meds & Orders section of the refill encounter.              Passed - Medication is active on med list        Passed - Patient is age 18 or older        Aida Guerrero RN BSN      "

## 2020-04-24 ENCOUNTER — VIRTUAL VISIT (OUTPATIENT)
Dept: FAMILY MEDICINE | Facility: CLINIC | Age: 35
End: 2020-04-24
Payer: MEDICAID

## 2020-04-24 DIAGNOSIS — G56.03 BILATERAL CARPAL TUNNEL SYNDROME: Primary | ICD-10-CM

## 2020-04-24 PROCEDURE — 99442 ZZC PHYSICIAN TELEPHONE EVALUATION 11-20 MIN: CPT | Performed by: FAMILY MEDICINE

## 2020-04-24 NOTE — PROGRESS NOTES
"Brian Ortiz is a 34 year old male who is being evaluated via a billable telephone visit.      The patient has been notified of following:     \"This telephone visit will be conducted via a call between you and your physician/provider. We have found that certain health care needs can be provided without the need for a physical exam.  This service lets us provide the care you need with a short phone conversation.  If a prescription is necessary we can send it directly to your pharmacy.  If lab work is needed we can place an order for that and you can then stop by our lab to have the test done at a later time.    Telephone visits are billed at different rates depending on your insurance coverage. During this emergency period, for some insurers they may be billed the same as an in-person visit.  Please reach out to your insurance provider with any questions.    If during the course of the call the physician/provider feels a telephone visit is not appropriate, you will not be charged for this service.\"    Patient has given verbal consent for Telephone visit?  Yes    How would you like to obtain your AVS? E-Mail (inform patient AVS not encrypted)    Subjective     Brian Ortiz is a 34 year old male who presents to clinic today for the following health issues:    HPI        Patient Active Problem List   Diagnosis     Scoliosis associated with other condition     Hearing loss     Multiple congenital malformations, not elsewhere classified     Constipation     CARDIOVASCULAR SCREENING; LDL GOAL LESS THAN 160     Anal fissure     Acne     Hearing decreased     BPPV (benign paroxysmal positional vertigo)     Benign non-nodular prostatic hyperplasia with lower urinary tract symptoms     Past Surgical History:   Procedure Laterality Date     HC CREATE EARDRUM OPENING,GEN ANESTH      P.E. Tubes     SURGICAL HISTORY OF -   08/10/2004    Madibular midline ostectom;y with anterior ilac crest bone graft.  Bilateral sagittal " split ramus osteotomy.  [facility]       Social History     Tobacco Use     Smoking status: Never Smoker     Smokeless tobacco: Never Used   Substance Use Topics     Alcohol use: No     Alcohol/week: 0.0 standard drinks     Family History   Adopted: Yes   Problem Relation Age of Onset     Substance Abuse Mother      Substance Abuse Father          Current Outpatient Medications   Medication Sig Dispense Refill     buPROPion (WELLBUTRIN SR) 150 MG 12 hr tablet Take 150 mg by mouth 2 times daily       carBAMazepine (TEGRETOL XR) 200 MG 12 hr tablet TAKE ONE TABLET BY MOUTH TWICE A DAY 60 tablet 0     FANAPT 6 MG TABS tablet Take 6 mg by mouth daily Take 1/2 tab by mouth daily       hydrOXYzine (VISTARIL) 50 MG capsule TAKE ONE CAPSULE BY MOUTH TWICE A DAY AS NEEDED  3     IBUPROFEN PO Take 400 mg by mouth every 8 hours as needed for moderate pain (headache)       PANTOPRAZOLE 40 MG PO EC tablet TAKE 1 TABLET BY MOUTH DAILY 30 tablet 5     STRATTERA 60 MG OR CAPS 1 CAPSULE EVERY MORNING 30 0     tamsulosin (FLOMAX) 0.4 MG capsule Take 1 capsule (0.4 mg) by mouth daily 30 capsule 0       Reviewed and updated as needed this visit by Provider         Review of Systems   ROS COMP: Constitutional, HEENT, cardiovascular, pulmonary, gi and gu systems are negative, except as otherwise noted.       Objective   Reported vitals:  There were no vitals taken for this visit.   healthy, alert and no distress  PSYCH: Alert and oriented times 3; coherent speech, normal   rate and volume, able to articulate logical thoughts, able   to abstract reason, no tangential thoughts, no hallucinations   or delusions  His affect is normal and pleasant  RESP: No cough, no audible wheezing, able to talk in full sentences  Remainder of exam unable to be completed due to telephone visits    Diagnostic Test Results:  none       Telephone Visit: Poor connection and he is a little hard to understand but he has all the symptoms of carpal tunnel both hands  numbness and tingling much worse in bed and in morning. Currently unemployed. Suggested Ibuprofen and if he can find wrist splints OTC.     Assessment/Plan:  1. Bilateral carpal tunnel syndrome  Getting worse and waking him up. EMG and go from there.  - NEUROLOGY ADULT REFERRAL    Return in about 2 weeks (around 5/8/2020), or EMG.      Phone call duration:  12 minutes    Duy Canseco MD

## 2020-05-01 ENCOUNTER — VIRTUAL VISIT (OUTPATIENT)
Dept: FAMILY MEDICINE | Facility: CLINIC | Age: 35
End: 2020-05-01
Payer: MEDICAID

## 2020-05-01 DIAGNOSIS — Q60.0 CONGENITAL SINGLE KIDNEY: ICD-10-CM

## 2020-05-01 DIAGNOSIS — R10.9 FLANK PAIN: Primary | ICD-10-CM

## 2020-05-01 PROCEDURE — 99441 ZZC PHYSICIAN TELEPHONE EVALUATION 5-10 MIN: CPT | Performed by: FAMILY MEDICINE

## 2020-05-01 NOTE — PROGRESS NOTES
"Brian Ortiz is a 34 year old male who is being evaluated via a billable telephone visit.      The patient has been notified of following:     \"This telephone visit will be conducted via a call between you and your physician/provider. We have found that certain health care needs can be provided without the need for a physical exam.  This service lets us provide the care you need with a short phone conversation.  If a prescription is necessary we can send it directly to your pharmacy.  If lab work is needed we can place an order for that and you can then stop by our lab to have the test done at a later time.    Telephone visits are billed at different rates depending on your insurance coverage. During this emergency period, for some insurers they may be billed the same as an in-person visit.  Please reach out to your insurance provider with any questions.    If during the course of the call the physician/provider feels a telephone visit is not appropriate, you will not be charged for this service.\"    Patient has given verbal consent for Telephone visit?  Yes    How would you like to obtain your AVS? Mail a copy    Subjective     Brian Ortiz is a 34 year old male who presents to clinic today for the following health issues:    HPI  Concern - left kidney pain   Onset: 2 months    Description:   Intermittent pain on left kidney    Intensity: mild    Progression of Symptoms:  same and intermittent    Accompanying Signs & Symptoms:  Dull to sharp    Previous history of similar problem:   yes    Precipitating factors:   Worsened by: none    Alleviating factors:  Improved by: increase in fluids    Therapies Tried and outcome: increasing fluids, change in diet               Reviewed and updated as needed this visit by Provider         Review of Systems   ROS COMP: Constitutional, HEENT, cardiovascular, pulmonary, gi and gu systems are negative, except as otherwise noted.       Objective   Reported vitals:  There " were no vitals taken for this visit.   healthy, alert and no distress  PSYCH: Alert and oriented times 3; coherent speech, normal   rate and volume, able to articulate logical thoughts, able   to abstract reason, no tangential thoughts, no hallucinations   or delusions  His affect is normal and pleasant  RESP: No cough, no audible wheezing, able to talk in full sentences  Remainder of exam unable to be completed due to telephone visits    Diagnostic Test Results:  none       Telephone visit: Talk to him about his complaint of left-sided kidney pain.  He states this is been going on for 2 months on and off seems more common now.  Describes some urinary frequency but no dysuria.  Has some congenital anomalies and apparently just a single kidney which is the left side.  He has seen nephrology and urology for this.  He had a VCUG done a few years ago which was fine.  Not had urinary tract infections.  No fever and the pain just comes and goes. No nausea.  He is not a real good historian he has some cognitive challenges.    Assessment/Plan:  1. Flank pain  Is been going to the weekend I do not think he has an acute issue here but this needs further work-up.  I talked with him about that.  It is set up an appointment for next week in the clinic and set him up for upper abdomen ultrasound which she has had in the past.  Also will do renal function tests and a urinalysis.  Much easier to deal with him in the clinic then over the phone.  He does not drive so he will need a ride to get here.  - US Renal Complete; Future    2. Congenital single kidney  See above.  - US Renal Complete; Future    Return in about 5 days (around 5/6/2020) for Routine Visit.      Phone call duration:  5 minutes    Duy Canseco MD

## 2020-05-08 ENCOUNTER — OFFICE VISIT (OUTPATIENT)
Dept: FAMILY MEDICINE | Facility: CLINIC | Age: 35
End: 2020-05-08
Payer: MEDICAID

## 2020-05-08 ENCOUNTER — HOSPITAL ENCOUNTER (OUTPATIENT)
Dept: ULTRASOUND IMAGING | Facility: CLINIC | Age: 35
Discharge: HOME | End: 2020-05-08
Attending: FAMILY MEDICINE | Admitting: FAMILY MEDICINE
Payer: MEDICAID

## 2020-05-08 VITALS
HEART RATE: 116 BPM | SYSTOLIC BLOOD PRESSURE: 114 MMHG | OXYGEN SATURATION: 98 % | DIASTOLIC BLOOD PRESSURE: 68 MMHG | TEMPERATURE: 98.6 F

## 2020-05-08 DIAGNOSIS — Q60.0 CONGENITAL SINGLE KIDNEY: ICD-10-CM

## 2020-05-08 DIAGNOSIS — R10.9 FLANK PAIN: Primary | ICD-10-CM

## 2020-05-08 DIAGNOSIS — R10.9 FLANK PAIN: ICD-10-CM

## 2020-05-08 LAB
ALBUMIN SERPL-MCNC: 3.4 G/DL (ref 3.4–5)
ALP SERPL-CCNC: 88 U/L (ref 40–150)
ALT SERPL W P-5'-P-CCNC: 57 U/L (ref 0–70)
ANION GAP SERPL CALCULATED.3IONS-SCNC: 5 MMOL/L (ref 3–14)
AST SERPL W P-5'-P-CCNC: 35 U/L (ref 0–45)
BILIRUB SERPL-MCNC: 0.2 MG/DL (ref 0.2–1.3)
BUN SERPL-MCNC: 11 MG/DL (ref 7–30)
CALCIUM SERPL-MCNC: 8.4 MG/DL (ref 8.5–10.1)
CHLORIDE SERPL-SCNC: 107 MMOL/L (ref 94–109)
CO2 SERPL-SCNC: 26 MMOL/L (ref 20–32)
CREAT SERPL-MCNC: 1.36 MG/DL (ref 0.66–1.25)
GFR SERPL CREATININE-BSD FRML MDRD: 67 ML/MIN/{1.73_M2}
GLUCOSE SERPL-MCNC: 113 MG/DL (ref 70–99)
POTASSIUM SERPL-SCNC: 4.2 MMOL/L (ref 3.4–5.3)
PROT SERPL-MCNC: 7.3 G/DL (ref 6.8–8.8)
SODIUM SERPL-SCNC: 138 MMOL/L (ref 133–144)

## 2020-05-08 PROCEDURE — 80053 COMPREHEN METABOLIC PANEL: CPT | Performed by: FAMILY MEDICINE

## 2020-05-08 PROCEDURE — 76770 US EXAM ABDO BACK WALL COMP: CPT

## 2020-05-08 PROCEDURE — 99214 OFFICE O/P EST MOD 30 MIN: CPT | Performed by: FAMILY MEDICINE

## 2020-05-08 PROCEDURE — 36415 COLL VENOUS BLD VENIPUNCTURE: CPT | Performed by: FAMILY MEDICINE

## 2020-05-08 NOTE — PROGRESS NOTES
Subjective     Brian Ortiz is a 34 year old male who presents to clinic today for the following health issues:  Chief Complaint   Patient presents with     Results     lab and us results       HPI   Side kidney pain      Duration: for a few months    Description (location/character/radiation): left side pain    Intensity:  moderate    Accompanying signs and symptoms: none    History (similar episodes/previous evaluation): yes    Precipitating or alleviating factors: None    Therapies tried and outcome: Drinking water seems to help     SUBJECTIVE:  Brian  is a 34 year old male who presents for: Continued issues with what he describes as kidney pain.  This is been going on for a long time.  Seems to be getting worse.  He denies any dysuria.  Just pain in his flank on the left in his upper abdomen.  No bowel movement changes.  No nausea.  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  Number mental health challenges as well.  Rather poor vague historian.  We had him come in for a renal ultrasound and a comprehensive chemistry panel.  Plus he was supposed to give a urine but could not.    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 Tubes     SURGICAL HISTORY OF -   08/10/2004    Madibular midline ostectom;y with anterior ilac crest bone graft.  Bilateral sagittal split ramus osteotomy.  [facility]     Social History     Tobacco Use     Smoking status: Never Smoker      Smokeless tobacco: Never Used   Substance Use Topics     Alcohol use: No     Alcohol/week: 0.0 standard drinks     Current Outpatient Medications   Medication Sig Dispense Refill     buPROPion (WELLBUTRIN SR) 150 MG 12 hr tablet Take 150 mg by mouth 2 times daily       FANAPT 6 MG TABS tablet Take 6 mg by mouth daily Take 1/2 tab by mouth daily       hydrOXYzine (VISTARIL) 50 MG capsule TAKE ONE CAPSULE BY MOUTH TWICE A DAY AS NEEDED  3     PANTOPRAZOLE 40 MG PO EC tablet TAKE 1 TABLET BY MOUTH DAILY 30 tablet 5     STRATTERA 60 MG OR CAPS 1 CAPSULE EVERY MORNING 30 0     tamsulosin (FLOMAX) 0.4 MG capsule Take 1 capsule (0.4 mg) by mouth daily 30 capsule 0     carBAMazepine (TEGRETOL XR) 200 MG 12 hr tablet TAKE ONE TABLET BY MOUTH TWICE A DAY (Patient not taking: Reported on 5/1/2020) 60 tablet 0     IBUPROFEN PO Take 400 mg by mouth every 8 hours as needed for moderate pain (headache)         REVIEW OF SYSTEMS:   5 point ROS negative except as noted above in HPI, including Gen., Resp, CV, GI &  system review.     OBJECTIVE:  Vitals: /68   Pulse 116   Temp 98.6  F (37  C) (Temporal)   SpO2 98%   BMI= There is no height or weight on file to calculate BMI.  He is alert appears in no distress.  Mucous membranes are moist.  Neck supple.  Lungs are clear.  Heart regular rhythm.  He has some pain in the left upper quadrant and some discomfort to palpation just below the costochondral margin on the left.  Ultrasound the kidney is entirely normal.  Chemistry panel shows a creatinine of 1.36 a GFR of 67.  This is similar to what it is been in the past.  Unable to give a UA.    ASSESSMENT:  Left upper abdomen and flank pain    PLAN:  Treat him that his kidney was fine.  As I said he has had this complaint for months and months.  We are just going to watch is going to keep himself well-hydrated he feels this helps.  Probably have to set him up for a CT scan of the abdomen if continues to have discomfort.  He can  come back and give a UA.        Duy Canseco MD  [facility]

## 2020-06-10 ENCOUNTER — NURSE TRIAGE (OUTPATIENT)
Dept: NURSING | Facility: CLINIC | Age: 35
End: 2020-06-10

## 2020-06-11 ENCOUNTER — APPOINTMENT (OUTPATIENT)
Dept: GENERAL RADIOLOGY | Facility: CLINIC | Age: 35
End: 2020-06-11
Attending: EMERGENCY MEDICINE
Payer: MEDICAID

## 2020-06-11 ENCOUNTER — HOSPITAL ENCOUNTER (EMERGENCY)
Facility: CLINIC | Age: 35
Discharge: HOME | End: 2020-06-11
Attending: EMERGENCY MEDICINE | Admitting: EMERGENCY MEDICINE
Payer: MEDICAID

## 2020-06-11 VITALS
DIASTOLIC BLOOD PRESSURE: 94 MMHG | RESPIRATION RATE: 11 BRPM | SYSTOLIC BLOOD PRESSURE: 137 MMHG | OXYGEN SATURATION: 95 % | HEART RATE: 112 BPM | TEMPERATURE: 97.4 F

## 2020-06-11 DIAGNOSIS — R07.89 CHEST WALL PAIN: ICD-10-CM

## 2020-06-11 LAB
ALBUMIN UR-MCNC: NEGATIVE MG/DL
ANION GAP SERPL CALCULATED.3IONS-SCNC: 9 MMOL/L (ref 3–14)
APPEARANCE UR: CLEAR
BASOPHILS # BLD AUTO: 0.1 10E9/L (ref 0–0.2)
BASOPHILS NFR BLD AUTO: 1 %
BILIRUB UR QL STRIP: NEGATIVE
BUN SERPL-MCNC: 11 MG/DL (ref 7–30)
CALCIUM SERPL-MCNC: 8.3 MG/DL (ref 8.5–10.1)
CHLORIDE SERPL-SCNC: 104 MMOL/L (ref 94–109)
CO2 SERPL-SCNC: 22 MMOL/L (ref 20–32)
COLOR UR AUTO: YELLOW
CREAT SERPL-MCNC: 1.17 MG/DL (ref 0.66–1.25)
D DIMER PPP FEU-MCNC: 0.5 UG/ML FEU (ref 0–0.5)
DIFFERENTIAL METHOD BLD: NORMAL
EOSINOPHIL NFR BLD AUTO: 3.4 %
ERYTHROCYTE [DISTWIDTH] IN BLOOD BY AUTOMATED COUNT: 12.8 % (ref 10–15)
GFR SERPL CREATININE-BSD FRML MDRD: 80 ML/MIN/{1.73_M2}
GLUCOSE SERPL-MCNC: 122 MG/DL (ref 70–99)
GLUCOSE UR STRIP-MCNC: NEGATIVE MG/DL
HCT VFR BLD AUTO: 43.9 % (ref 40–53)
HGB BLD-MCNC: 14.8 G/DL (ref 13.3–17.7)
HGB UR QL STRIP: NEGATIVE
IMM GRANULOCYTES # BLD: 0 10E9/L (ref 0–0.4)
IMM GRANULOCYTES NFR BLD: 0.2 %
KETONES UR STRIP-MCNC: NEGATIVE MG/DL
LEUKOCYTE ESTERASE UR QL STRIP: NEGATIVE
LYMPHOCYTES # BLD AUTO: 2.8 10E9/L (ref 0.8–5.3)
LYMPHOCYTES NFR BLD AUTO: 47.4 %
MCH RBC QN AUTO: 27 PG (ref 26.5–33)
MCHC RBC AUTO-ENTMCNC: 33.7 G/DL (ref 31.5–36.5)
MCV RBC AUTO: 80 FL (ref 78–100)
MONOCYTES # BLD AUTO: 0.5 10E9/L (ref 0–1.3)
MONOCYTES NFR BLD AUTO: 8.3 %
NEUTROPHILS # BLD AUTO: 2.4 10E9/L (ref 1.6–8.3)
NEUTROPHILS NFR BLD AUTO: 39.7 %
NITRATE UR QL: NEGATIVE
NRBC # BLD AUTO: 0 10*3/UL
NRBC BLD AUTO-RTO: 0 /100
PH UR STRIP: 6 PH (ref 5–7)
PLATELET # BLD AUTO: 206 10E9/L (ref 150–450)
POTASSIUM SERPL-SCNC: 3.9 MMOL/L (ref 3.4–5.3)
RBC # BLD AUTO: 5.48 10E12/L (ref 4.4–5.9)
SODIUM SERPL-SCNC: 135 MMOL/L (ref 133–144)
SOURCE: NORMAL
SP GR UR STRIP: 1.02 (ref 1–1.03)
TROPONIN I SERPL-MCNC: <0.015 UG/L (ref 0–0.04)
UROBILINOGEN UR STRIP-MCNC: 0 MG/DL (ref 0–2)
WBC # BLD AUTO: 5.9 10E9/L (ref 4–11)

## 2020-06-11 PROCEDURE — 99285 EMERGENCY DEPT VISIT HI MDM: CPT | Mod: 25 | Performed by: EMERGENCY MEDICINE

## 2020-06-11 PROCEDURE — 93005 ELECTROCARDIOGRAM TRACING: CPT | Performed by: EMERGENCY MEDICINE

## 2020-06-11 PROCEDURE — 93010 ELECTROCARDIOGRAM REPORT: CPT | Mod: Z6 | Performed by: EMERGENCY MEDICINE

## 2020-06-11 PROCEDURE — 81003 URINALYSIS AUTO W/O SCOPE: CPT | Performed by: EMERGENCY MEDICINE

## 2020-06-11 PROCEDURE — 85025 COMPLETE CBC W/AUTO DIFF WBC: CPT | Performed by: EMERGENCY MEDICINE

## 2020-06-11 PROCEDURE — 80048 BASIC METABOLIC PNL TOTAL CA: CPT | Performed by: EMERGENCY MEDICINE

## 2020-06-11 PROCEDURE — 85379 FIBRIN DEGRADATION QUANT: CPT | Performed by: EMERGENCY MEDICINE

## 2020-06-11 PROCEDURE — 84484 ASSAY OF TROPONIN QUANT: CPT | Performed by: EMERGENCY MEDICINE

## 2020-06-11 PROCEDURE — 71046 X-RAY EXAM CHEST 2 VIEWS: CPT | Mod: TC

## 2020-06-11 ASSESSMENT — ENCOUNTER SYMPTOMS
CONFUSION: 0
FEVER: 0
NECK STIFFNESS: 0
EYE REDNESS: 0
ARTHRALGIAS: 0
HEADACHES: 0
PALPITATIONS: 0
DIFFICULTY URINATING: 0
COLOR CHANGE: 0
ABDOMINAL PAIN: 0
SHORTNESS OF BREATH: 0

## 2020-06-11 NOTE — ED TRIAGE NOTES
"\"Random Chest pain\" that rate between 1 and 3 \"around his heart and to below his lungs. This started a few days ago when he got mad at his computer and yelled real loud and long. Has had the pains sporadically since and decided to get it checked out. Came in by EMS Sinus tach History of anxiety.   "

## 2020-06-11 NOTE — ED PROVIDER NOTES
"  History     Chief Complaint   Patient presents with     Chest Pain     HPI  Brian Ortiz is a 34 year old male who presents with chest discomfort.  Tuesday evening he had a emotional rage where he started violently yelling at his computer and banging on his computer table when his mouse would not work during a computer game.  Since that time is been having intermittent anterior chest wall pain.  Increasing anxiety has been building and he presents wanted to make sure that he did not \"hurt his heart\".    Significant past medical history for congenital anomaly of kidney, attention deficit disorder, depressive disorder, mandibular hypoplasia and other nonspecific congenital anomalies.    Cardiovascular risk is negative for DM, hypertension, hyperlipidemia, tobacco use.  No mention of any family history.  Denies any drug or alcohol use.      Allergies:  Allergies   Allergen Reactions     No Known Drug Allergies        Problem List:    Patient Active Problem List    Diagnosis Date Noted     Benign non-nodular prostatic hyperplasia with lower urinary tract symptoms 08/31/2016     Priority: Medium     Hearing decreased 04/22/2015     Priority: Medium     BPPV (benign paroxysmal positional vertigo) 04/22/2015     Priority: Medium     Anal fissure 05/06/2011     Priority: Medium     Acne 05/06/2011     Priority: Medium     CARDIOVASCULAR SCREENING; LDL GOAL LESS THAN 160 10/31/2010     Priority: Medium     Constipation 01/09/2008     Priority: Medium     Problem list name updated by automated process. Provider to review       Scoliosis associated with other condition 01/14/2005     Priority: Medium     Hearing loss 01/14/2005     Priority: Medium     Problem list name updated by automated process. Provider to review       Multiple congenital malformations, not elsewhere classified 01/14/2005     Priority: Medium     Diagnosis updated by automated process. Provider to review and confirm.          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 Tubes     SURGICAL HISTORY OF -   08/10/2004    Madibular midline ostectom;y with anterior ilac crest bone graft.  Bilateral sagittal split ramus osteotomy.  [facility]       Family History:    Family History   Adopted: Yes   Problem Relation Age of Onset     Substance Abuse Mother      Substance Abuse Father        Social History:  Marital Status:  Single [1]  Social History     Tobacco Use     Smoking status: Never Smoker     Smokeless tobacco: Never Used   Substance Use Topics     Alcohol use: No     Alcohol/week: 0.0 standard drinks     Drug use: No        Medications:    buPROPion (WELLBUTRIN SR) 150 MG 12 hr tablet  carBAMazepine (TEGRETOL XR) 200 MG 12 hr tablet  FANAPT 6 MG TABS tablet  hydrOXYzine (VISTARIL) 50 MG capsule  IBUPROFEN PO  PANTOPRAZOLE 40 MG PO EC tablet  STRATTERA 60 MG OR CAPS  tamsulosin (FLOMAX) 0.4 MG capsule          Review of Systems   Constitutional: Negative for fever.   HENT: Negative for congestion.    Eyes: Negative for redness.   Respiratory: Negative for shortness of breath.    Cardiovascular: Positive for chest pain. Negative for palpitations and leg swelling.   Gastrointestinal: Negative for abdominal pain.   Genitourinary: Negative for difficulty urinating.   Musculoskeletal: Negative for arthralgias and neck stiffness.   Skin: Negative for color change.   Neurological: Negative for headaches.   Psychiatric/Behavioral: Negative for confusion.   All other systems reviewed and are negative.      Physical Exam   BP: (!)  136/102  Pulse: 117  Temp: 97.4  F (36.3  C)  Resp: 20  SpO2: 100 %      Physical Exam  Vitals signs and nursing note reviewed.   Constitutional:       General: He is not in acute distress.     Appearance: He is obese.   Eyes:      Pupils: Pupils are equal, round, and reactive to light.   Neck:      Musculoskeletal: Normal range of motion.      Vascular: No JVD.   Cardiovascular:      Rate and Rhythm: Tachycardia present.      Heart sounds: Normal heart sounds.   Pulmonary:      Effort: Pulmonary effort is normal.      Breath sounds: Normal breath sounds. No wheezing, rhonchi or rales.   Chest:      Chest wall: Tenderness (Palpation does reproduce tenderness along the costal margins bilateral) present. No mass.   Abdominal:      General: Bowel sounds are normal.      Palpations: Abdomen is soft.   Musculoskeletal: Normal range of motion.      Right lower leg: He exhibits no tenderness. No edema.      Left lower leg: He exhibits no tenderness. No edema.   Skin:     General: Skin is warm and dry.      Capillary Refill: Capillary refill takes less than 2 seconds.   Neurological:      General: No focal deficit present.   Psychiatric:         Mood and Affect: Mood normal.         ED Course        Procedures          EKG:  Interpretation by Lit Canseco DO.   Indication: Chest pain  Sinus tachycardia rate 110-115 bpm  Normal axis  Occasional PAC  No PVCs  Normal repolarization  Impressions: Normal EKG except for sinus tachycardia(suspect anxiety driven)             No results found for this or any previous visit (from the past 24 hour(s)).    Medications - No data to display    Assessments & Plan (with Medical Decision Making)  34-year-old male presents with chest wall pain after he had a violent rage at his computer on Tuesday night when his mouse stopped working during competitive nikhil.  Since that time is been have intermittent discomfort.  Does acknowledge the chest wall has been tender.  History for mixed mood  "disorder.  Growing anxiety this could be his heart so he came in to get \"checked out\".  Cardiac risk factors are negative for DM, hypertension, hyperlipidemia, family history, drug or alcohol use.  No tobacco history.  Examination noted an anxious patient who mild elevation of his diastolic blood pressure and he was in a sinus tachycardia with a rate of 110 bpm.  Cardia examination revealed normal rhythm.  No ectopy.  Identified no murmur or friction rub.  No JVD or carotid bruits.  Lungs are clear to auscultation.  His abdomen soft nontender.  Legs were well perfused and he had no dependent edema.  Negative Homans test bilateral.  EKG was sinus tachycardia with rate of 110-115 bpm with no acute repolarization changes.  Plan: Troponin, EKG, chest x-ray if negative discharge home.  Low probability this is a cardiopulmonary complaint.  Most likely costochondral discomfort.  10:47 AM  EKG normal.  Chest x-ray two-view normal.  Troponin normal.  Chest wall remains tender to palpation consistent with costochondral pain.       I have reviewed the nursing notes.    I have reviewed the findings, diagnosis, plan and need for follow up with the patient.      New Prescriptions    No medications on file       Final diagnoses:   Chest wall pain       6/11/2020   [facility] EMERGENCY DEPARTMENT     Lit Canseco,   06/11/20 1047    "

## 2020-06-11 NOTE — DISCHARGE INSTRUCTIONS
"Medical evaluation for chest pain identified that the primary source is coming from your chest wall.  Heart work-up noted normal examination, EKG, chest x-ray.  In addition a cardiac enzyme-\"troponin\"was normal.  May return to normal activities.  Chest wall pain can be managed with Tylenol number Profen as needed.    Your symptoms are not heart or lung related.  "

## 2020-06-11 NOTE — TELEPHONE ENCOUNTER
Patient reports yesterday he was angry and he screamed too hard and ever since then having chest pain on and off. Chest pain is 2/10- feels like a dull ache. Reports constant for the last 5 minutes. Has history of obesity and high BMI.   Denies difficulty breathing.   Advised per protocol to call 911. Patient responds Okay and had no further questions.   Nayeli Katz RN/[facility]      COVID 19 Nurse Triage Plan/Patient Instructions    Please be aware that novel coronavirus (COVID-19) may be circulating in the community. If you develop symptoms such as fever, cough, or SOB or if you have concerns about the presence of another infection including coronavirus (COVID-19), please contact your health care provider or visit [email or web address].     Disposition/Instructions    Patient to call EMS/911 and follow protocol based instructions.     Bring Your Own Device:  Please also bring your smart device(s) (smart phones, tablets, laptops) and their charging cables for your personal use and to communicate with your care team during your visit.    Thank you for taking steps to prevent the spread of this virus.  o Limit your contact with others.  o Wear a simple mask to cover your cough.  o Wash your hands well and often.    Resources    [facility]: About COVID-19: [email or web address]    CDC: What to Do If You're Sick: www.cdc.gov/coronavirus/2019-ncov/about/steps-when-sick.html    CDC: Ending Home Isolation: www.cdc.gov/coronavirus/2019-ncov/hcp/disposition-in-home-patients.html     CDC: Caring for Someone: www.cdc.gov/coronavirus/2019-ncov/if-you-are-sick/care-for-someone.html     [facility]: Interim Guidance for Hospital Discharge to Home: www.health.[facility].[place].us/diseases/coronavirus/hcp/hospdischarge.pdf    [facility] clinical trials (COVID-19 research studies): [email or web address]/umn-[email or web address]-trials     Below are the COVID-19 hotlines at the [facility]  [facility] ([facility]). Interpreters are available.   o For health questions: Call 397-951-2847 or 1-210.140.9120 (7 a.m. to 7 p.m.)  o For questions about schools and childcare: Call 000-229-1636 or 1-995.476.8440 (7 a.m. to 7 p.m.)                         Reason for Disposition    [1] Chest pain lasts > 5 minutes AND [2] age > 30 AND [3] at least one cardiac risk factor (i.e., hypertension, diabetes, obesity, smoker or strong family history of heart disease)    Additional Information    Negative: Severe difficulty breathing (e.g., struggling for each breath, speaks in single words)    Negative: Difficult to awaken or acting confused (e.g., disoriented, slurred speech)    Negative: Shock suspected (e.g., cold/pale/clammy skin, too weak to stand, low BP, rapid pulse)    Negative: [1] Chest pain lasts > 5 minutes AND [2] history of heart disease  (i.e., heart attack, bypass surgery, angina, angioplasty, CHF; not just a heart murmur)    Negative: [1] Chest pain lasts > 5 minutes AND [2] described as crushing, pressure-like, or heavy    Negative: [1] Chest pain lasts > 5 minutes AND [2] age > 50    Protocols used: CHEST PAIN-A-

## 2020-06-11 NOTE — ED AVS SNAPSHOT
[facility] Emergency Department  [address] [place] [address]  Phone:  [phone]  Fax:  [phone]                                    Brian Ortiz   MRN: [id number]    Department:  [facility] Emergency Department   Date of Visit:  6/11/2020           After Visit Summary Signature Page    I have received my discharge instructions, and my questions have been answered. I have discussed any challenges I see with this plan with the nurse or doctor.    ..........................................................................................................................................  Patient/Patient Representative Signature      ..........................................................................................................................................  Patient Representative Print Name and Relationship to Patient    ..................................................               ................................................  Date                                   Time    ..........................................................................................................................................  Reviewed by Signature/Title    ...................................................              ..............................................  Date                                               Time          22EPIC Rev 08/18

## 2020-07-01 ENCOUNTER — VIRTUAL VISIT (OUTPATIENT)
Dept: FAMILY MEDICINE | Facility: CLINIC | Age: 35
End: 2020-07-01
Payer: MEDICAID

## 2020-07-01 DIAGNOSIS — F70 MILD INTELLECTUAL DISABILITIES: ICD-10-CM

## 2020-07-01 DIAGNOSIS — R41.3 MEMORY CHANGES: Primary | ICD-10-CM

## 2020-07-01 DIAGNOSIS — G47.00 INSOMNIA, UNSPECIFIED TYPE: ICD-10-CM

## 2020-07-01 DIAGNOSIS — F90.2 ADHD (ATTENTION DEFICIT HYPERACTIVITY DISORDER), COMBINED TYPE: ICD-10-CM

## 2020-07-01 PROCEDURE — 99441 ZZC PHYSICIAN TELEPHONE EVALUATION 5-10 MIN: CPT | Performed by: FAMILY MEDICINE

## 2020-07-01 NOTE — PROGRESS NOTES
"Brian Ortiz is a 34 year old male who is being evaluated via a billable telephone visit.      The patient has been notified of following:     \"This telephone visit will be conducted via a call between you and your physician/provider. We have found that certain health care needs can be provided without the need for a physical exam.  This service lets us provide the care you need with a short phone conversation.  If a prescription is necessary we can send it directly to your pharmacy.  If lab work is needed we can place an order for that and you can then stop by our lab to have the test done at a later time.    Telephone visits are billed at different rates depending on your insurance coverage. During this emergency period, for some insurers they may be billed the same as an in-person visit.  Please reach out to your insurance provider with any questions.    If during the course of the call the physician/provider feels a telephone visit is not appropriate, you will not be charged for this service.\"    Patient has given verbal consent for Telephone visit?  Yes    What phone number would you like to be contacted at? [phone]    How would you like to obtain your AVS? MyChart    Subjective     Brian Ortiz is a 34 year old male who presents via phone visit today for the following health issues: Complaining of some lapses in memory.  See issues above noted.  He is on a number of mental health medications.  He does see a mental health provider.  I told him that this may be part of the concern but also he does have ADHD and I believe at times he just gets forgetful.  He is having trouble sleeping.  Again this could be a combination of some of the medications he is on.  He is very vague.  Chief Complaint   Patient presents with     Memory Loss     a few days ago forgot to drink his milk took 10 hours.  It took him 5 mins to remember the date     Sleep Problem     is having trouble staying asleep for a few months " less than 6 months       HPI  Insomnia  Duration of complaint: for a few months     Patient Active Problem List   Diagnosis     Scoliosis associated with other condition     Hearing loss     Multiple congenital malformations, not elsewhere classified     Constipation     CARDIOVASCULAR SCREENING; LDL GOAL LESS THAN 160     Anal fissure     Acne     Hearing decreased     BPPV (benign paroxysmal positional vertigo)     Benign non-nodular prostatic hyperplasia with lower urinary tract symptoms     Mild intellectual disabilities     Goldenhar syndrome     ADHD (attention deficit hyperactivity disorder), combined type     Past Surgical History:   Procedure Laterality Date     HC CREATE EARDRUM OPENING,GEN ANESTH      P.E. Tubes     SURGICAL HISTORY OF -   08/10/2004    Madibular midline ostectom;y with anterior ilac crest bone graft.  Bilateral sagittal split ramus osteotomy.  [facility]       Social History     Tobacco Use     Smoking status: Never Smoker     Smokeless tobacco: Never Used   Substance Use Topics     Alcohol use: No     Alcohol/week: 0.0 standard drinks     Family History   Adopted: Yes   Problem Relation Age of Onset     Substance Abuse Mother      Substance Abuse Father          Current Outpatient Medications   Medication Sig Dispense Refill     buPROPion (WELLBUTRIN SR) 150 MG 12 hr tablet Take 150 mg by mouth 2 times daily       FANAPT 6 MG TABS tablet Take 6 mg by mouth daily Take 1/2 tab by mouth daily       hydrOXYzine (VISTARIL) 50 MG capsule TAKE ONE CAPSULE BY MOUTH TWICE A DAY AS NEEDED  3     IBUPROFEN PO Take 400 mg by mouth every 8 hours as needed for moderate pain (headache)       PANTOPRAZOLE 40 MG PO EC tablet TAKE 1 TABLET BY MOUTH DAILY 30 tablet 5     STRATTERA 60 MG OR CAPS 1 CAPSULE EVERY MORNING 30 0     tamsulosin (FLOMAX) 0.4 MG capsule Take 1 capsule (0.4 mg) by mouth daily 30 capsule 0       Reviewed and updated as needed this visit by Provider         Review of Systems    Constitutional, HEENT, cardiovascular, pulmonary, gi and gu systems are negative, except as otherwise noted.       Objective   Reported vitals:  There were no vitals taken for this visit.   healthy, alert and no distress  PSYCH: Alert and oriented times 3; coherent speech, normal   rate and volume, able to articulate logical thoughts, able   to abstract reason, no tangential thoughts, no hallucinations   or delusions  His affect is normal and pleasant  RESP: No cough, no audible wheezing, able to talk in full sentences  Remainder of exam unable to be completed due to telephone visits    Diagnostic Test Results:  none         Assessment/Plan:  1. Memory changes  Discussed with him this may be part of his ADHD and he is not too concerned and I reassured him.    2. Insomnia, unspecified type  Recommended melatonin.  He already has Vistaril.    3. Mild intellectual disabilities  Stable    4. ADHD (attention deficit hyperactivity disorder), combined type  He is under the care of psychiatry      No follow-ups on file.      Phone call duration:  7 minutes    Duy Canseco MD

## 2020-07-12 PROBLEM — F70 MILD INTELLECTUAL DISABILITIES: Status: ACTIVE | Noted: 2020-07-12

## 2020-07-12 PROBLEM — F90.2 ADHD (ATTENTION DEFICIT HYPERACTIVITY DISORDER), COMBINED TYPE: Status: ACTIVE | Noted: 2020-07-12

## 2020-07-12 PROBLEM — Q87.0 GOLDENHAR SYNDROME: Status: ACTIVE | Noted: 2020-07-12

## 2020-07-27 ENCOUNTER — NURSE TRIAGE (OUTPATIENT)
Dept: FAMILY MEDICINE | Facility: CLINIC | Age: 35
End: 2020-07-27

## 2020-07-27 NOTE — TELEPHONE ENCOUNTER
"S-(situation): Intermittent difficulty swallowing solids for past 3 weeks.     B-(background):  He feels the sensation started when he was eating a Little Brie snack 3 weeks ago.     A-(assessment): dysphagia of unknown etiology     R-(recommendations):  Should be seen in 24 hours. RN offered to schedule appt with the ENT . He then said that he forgot he has a specialty clinic he sees for an ENT and he will call them for an appt . IT is NOT with the [facility].  RICKEY Sarmiento        Additional Information    [1] Swallowing difficulty AND [2] cause unknown (Exception: difficulty swallowing is a chronic symptom)    Answer Assessment - Initial Assessment Questions  1. SYMPTOM: \"Are you having difficulty swallowing liquids, solids, or both?\"      Difficulty swallowing solids more than liquids.   2. ONSET: \"When did the swallowing problems begin?\"        3 weeks ago.   3. CAUSE: \"What do you think is causing the problem?\"         I was having a snack , a little Brie Rice Crispy cake,  And I felt like I couldn't swallow it wholly, felt like something stuck .   4. CHRONIC/RECURRENT: \"Is this a new problem for you?\"  If no, ask: \"How long have you had this problem?\" (e.g., days, weeks, months)       This is a new problem for him.   5. OTHER SYMPTOMS: \"Do you have any other symptoms?\" (e.g., difficulty breathing, sore throat, swollen tongue, chest pain)      NO other sx.   6. PREGNANCY: \"Is there any chance you are pregnant?\" \"When was your last menstrual period?\"      NA.    Protocols used: SWALLOWING DIFFICULTY-A-AH      "

## 2020-08-06 DIAGNOSIS — K21.00 GASTROESOPHAGEAL REFLUX DISEASE WITH ESOPHAGITIS: ICD-10-CM

## 2020-08-06 RX ORDER — PANTOPRAZOLE SODIUM 40 MG/1
TABLET, DELAYED RELEASE ORAL
Qty: 30 TABLET | Refills: 5 | Status: SHIPPED | OUTPATIENT
Start: 2020-08-06 | End: 2021-01-28

## 2020-08-06 NOTE — TELEPHONE ENCOUNTER
Prescription approved per [facility] Refill Protocol.    Ginger Ignacio RN

## 2021-01-09 ENCOUNTER — HEALTH MAINTENANCE LETTER (OUTPATIENT)
Age: 36
End: 2021-01-09

## 2021-01-28 DIAGNOSIS — K21.00 GASTROESOPHAGEAL REFLUX DISEASE WITH ESOPHAGITIS: ICD-10-CM

## 2021-01-28 RX ORDER — PANTOPRAZOLE SODIUM 40 MG/1
TABLET, DELAYED RELEASE ORAL
Qty: 30 TABLET | Refills: 1 | Status: SHIPPED | OUTPATIENT
Start: 2021-01-28 | End: 2021-03-25

## 2021-01-28 NOTE — TELEPHONE ENCOUNTER
Prescription approved per [facility] [place].    Cara Ayala RN on 1/28/2021 at 3:29 PM

## 2021-03-24 DIAGNOSIS — K21.00 GASTROESOPHAGEAL REFLUX DISEASE WITH ESOPHAGITIS: ICD-10-CM

## 2021-03-25 RX ORDER — PANTOPRAZOLE SODIUM 40 MG/1
TABLET, DELAYED RELEASE ORAL
Qty: 30 TABLET | Refills: 1 | Status: SHIPPED | OUTPATIENT
Start: 2021-03-25 | End: 2021-06-17

## 2021-04-30 ENCOUNTER — IMMUNIZATION (OUTPATIENT)
Dept: FAMILY MEDICINE | Facility: CLINIC | Age: 36
End: 2021-04-30
Payer: MEDICAID

## 2021-04-30 PROCEDURE — 91301 PR COVID VAC MODERNA 100 MCG/0.5 ML IM: CPT

## 2021-04-30 PROCEDURE — 0011A PR COVID VAC MODERNA 100 MCG/0.5 ML IM: CPT

## 2021-05-28 ENCOUNTER — IMMUNIZATION (OUTPATIENT)
Dept: FAMILY MEDICINE | Facility: CLINIC | Age: 36
End: 2021-05-28
Attending: FAMILY MEDICINE
Payer: MEDICAID

## 2021-05-28 PROCEDURE — 91301 PR COVID VAC MODERNA 100 MCG/0.5 ML IM: CPT

## 2021-05-28 PROCEDURE — 0012A PR COVID VAC MODERNA 100 MCG/0.5 ML IM: CPT

## 2021-05-30 ENCOUNTER — HOSPITAL ENCOUNTER (EMERGENCY)
Facility: CLINIC | Age: 36
Discharge: HOME | End: 2021-05-30
Attending: FAMILY MEDICINE | Admitting: FAMILY MEDICINE
Payer: MEDICAID

## 2021-05-30 ENCOUNTER — APPOINTMENT (OUTPATIENT)
Dept: GENERAL RADIOLOGY | Facility: CLINIC | Age: 36
End: 2021-05-30
Attending: FAMILY MEDICINE
Payer: MEDICAID

## 2021-05-30 ENCOUNTER — NURSE TRIAGE (OUTPATIENT)
Dept: NURSING | Facility: CLINIC | Age: 36
End: 2021-05-30

## 2021-05-30 ENCOUNTER — APPOINTMENT (OUTPATIENT)
Dept: CT IMAGING | Facility: CLINIC | Age: 36
End: 2021-05-30
Attending: FAMILY MEDICINE
Payer: MEDICAID

## 2021-05-30 VITALS
RESPIRATION RATE: 28 BRPM | SYSTOLIC BLOOD PRESSURE: 109 MMHG | TEMPERATURE: 98.8 F | WEIGHT: 183 LBS | HEART RATE: 105 BPM | OXYGEN SATURATION: 98 % | DIASTOLIC BLOOD PRESSURE: 76 MMHG | BODY MASS INDEX: 33.47 KG/M2

## 2021-05-30 DIAGNOSIS — T50.Z95A IMMUNIZATION REACTION, INITIAL ENCOUNTER: ICD-10-CM

## 2021-05-30 LAB
ALBUMIN SERPL-MCNC: 3.2 G/DL (ref 3.4–5)
ALBUMIN UR-MCNC: 30 MG/DL
ALP SERPL-CCNC: 86 U/L (ref 40–150)
ALT SERPL W P-5'-P-CCNC: 43 U/L (ref 0–70)
ANION GAP SERPL CALCULATED.3IONS-SCNC: 5 MMOL/L (ref 3–14)
APPEARANCE UR: CLEAR
AST SERPL W P-5'-P-CCNC: 32 U/L (ref 0–45)
BASE EXCESS BLDV CALC-SCNC: 2.3 MMOL/L
BASOPHILS # BLD AUTO: 0 10E9/L (ref 0–0.2)
BASOPHILS NFR BLD AUTO: 0.4 %
BILIRUB SERPL-MCNC: 0.3 MG/DL (ref 0.2–1.3)
BILIRUB UR QL STRIP: NEGATIVE
BUN SERPL-MCNC: 11 MG/DL (ref 7–30)
CALCIUM SERPL-MCNC: 8.5 MG/DL (ref 8.5–10.1)
CHLORIDE SERPL-SCNC: 104 MMOL/L (ref 94–109)
CO2 SERPL-SCNC: 26 MMOL/L (ref 20–32)
COLOR UR AUTO: YELLOW
CREAT SERPL-MCNC: 1.21 MG/DL (ref 0.66–1.25)
CRP SERPL-MCNC: 128 MG/L (ref 0–8)
D DIMER PPP FEU-MCNC: 1.2 UG/ML FEU (ref 0–0.5)
DIFFERENTIAL METHOD BLD: NORMAL
EOSINOPHIL # BLD AUTO: 0.2 10E9/L (ref 0–0.7)
EOSINOPHIL NFR BLD AUTO: 3.2 %
ERYTHROCYTE [DISTWIDTH] IN BLOOD BY AUTOMATED COUNT: 14 % (ref 10–15)
GFR SERPL CREATININE-BSD FRML MDRD: 76 ML/MIN/{1.73_M2}
GLUCOSE SERPL-MCNC: 126 MG/DL (ref 70–99)
GLUCOSE UR STRIP-MCNC: 50 MG/DL
HCO3 BLDV-SCNC: 27 MMOL/L (ref 21–28)
HCT VFR BLD AUTO: 40.8 % (ref 40–53)
HGB BLD-MCNC: 14 G/DL (ref 13.3–17.7)
HGB UR QL STRIP: NEGATIVE
HYALINE CASTS #/AREA URNS LPF: 1 /LPF (ref 0–2)
IMM GRANULOCYTES # BLD: 0 10E9/L (ref 0–0.4)
IMM GRANULOCYTES NFR BLD: 0.6 %
KETONES UR STRIP-MCNC: NEGATIVE MG/DL
LABORATORY COMMENT REPORT: NORMAL
LACTATE BLD-SCNC: 1 MMOL/L (ref 0.7–2)
LEUKOCYTE ESTERASE UR QL STRIP: NEGATIVE
LYMPHOCYTES # BLD AUTO: 1.2 10E9/L (ref 0.8–5.3)
LYMPHOCYTES NFR BLD AUTO: 25.8 %
MCH RBC QN AUTO: 26.7 PG (ref 26.5–33)
MCHC RBC AUTO-ENTMCNC: 34.3 G/DL (ref 31.5–36.5)
MCV RBC AUTO: 78 FL (ref 78–100)
MONOCYTES # BLD AUTO: 0.5 10E9/L (ref 0–1.3)
MONOCYTES NFR BLD AUTO: 10.1 %
MUCOUS THREADS #/AREA URNS LPF: PRESENT /LPF
NEUTROPHILS # BLD AUTO: 2.8 10E9/L (ref 1.6–8.3)
NEUTROPHILS NFR BLD AUTO: 59.9 %
NITRATE UR QL: NEGATIVE
NRBC # BLD AUTO: 0 10*3/UL
NRBC BLD AUTO-RTO: 0 /100
O2/TOTAL GAS SETTING VFR VENT: 21 %
PCO2 BLDV: 41 MM HG (ref 40–50)
PH BLDV: 7.43 PH (ref 7.32–7.43)
PH UR STRIP: 5 PH (ref 5–7)
PLATELET # BLD AUTO: 173 10E9/L (ref 150–450)
PO2 BLDV: 60 MM HG (ref 25–47)
POTASSIUM SERPL-SCNC: 3.8 MMOL/L (ref 3.4–5.3)
PROT SERPL-MCNC: 7.1 G/DL (ref 6.8–8.8)
RBC # BLD AUTO: 5.24 10E12/L (ref 4.4–5.9)
RBC #/AREA URNS AUTO: 1 /HPF (ref 0–2)
SARS-COV-2 RNA RESP QL NAA+PROBE: NEGATIVE
SODIUM SERPL-SCNC: 135 MMOL/L (ref 133–144)
SOURCE: ABNORMAL
SP GR UR STRIP: >1.035 (ref 1–1.03)
SPECIMEN SOURCE: NORMAL
SQUAMOUS #/AREA URNS AUTO: <1 /HPF (ref 0–1)
TROPONIN I SERPL-MCNC: <0.015 UG/L (ref 0–0.04)
UROBILINOGEN UR STRIP-MCNC: 0 MG/DL (ref 0–2)
WBC # BLD AUTO: 4.7 10E9/L (ref 4–11)
WBC #/AREA URNS AUTO: 1 /HPF (ref 0–5)

## 2021-05-30 PROCEDURE — 99285 EMERGENCY DEPT VISIT HI MDM: CPT | Mod: 25 | Performed by: FAMILY MEDICINE

## 2021-05-30 PROCEDURE — C9803 HOPD COVID-19 SPEC COLLECT: HCPCS | Performed by: FAMILY MEDICINE

## 2021-05-30 PROCEDURE — 93005 ELECTROCARDIOGRAM TRACING: CPT | Performed by: FAMILY MEDICINE

## 2021-05-30 PROCEDURE — 85025 COMPLETE CBC W/AUTO DIFF WBC: CPT | Performed by: FAMILY MEDICINE

## 2021-05-30 PROCEDURE — 250N000011 HC RX IP 250 OP 636: Performed by: FAMILY MEDICINE

## 2021-05-30 PROCEDURE — 71045 X-RAY EXAM CHEST 1 VIEW: CPT

## 2021-05-30 PROCEDURE — 81001 URINALYSIS AUTO W/SCOPE: CPT | Performed by: FAMILY MEDICINE

## 2021-05-30 PROCEDURE — 86140 C-REACTIVE PROTEIN: CPT | Performed by: FAMILY MEDICINE

## 2021-05-30 PROCEDURE — 71275 CT ANGIOGRAPHY CHEST: CPT

## 2021-05-30 PROCEDURE — 83605 ASSAY OF LACTIC ACID: CPT | Performed by: FAMILY MEDICINE

## 2021-05-30 PROCEDURE — 93010 ELECTROCARDIOGRAM REPORT: CPT | Performed by: FAMILY MEDICINE

## 2021-05-30 PROCEDURE — 96361 HYDRATE IV INFUSION ADD-ON: CPT | Performed by: FAMILY MEDICINE

## 2021-05-30 PROCEDURE — 258N000003 HC RX IP 258 OP 636: Performed by: FAMILY MEDICINE

## 2021-05-30 PROCEDURE — 85379 FIBRIN DEGRADATION QUANT: CPT | Performed by: FAMILY MEDICINE

## 2021-05-30 PROCEDURE — 84484 ASSAY OF TROPONIN QUANT: CPT | Performed by: FAMILY MEDICINE

## 2021-05-30 PROCEDURE — 96374 THER/PROPH/DIAG INJ IV PUSH: CPT | Mod: 59 | Performed by: FAMILY MEDICINE

## 2021-05-30 PROCEDURE — 82803 BLOOD GASES ANY COMBINATION: CPT | Performed by: FAMILY MEDICINE

## 2021-05-30 PROCEDURE — 87635 SARS-COV-2 COVID-19 AMP PRB: CPT | Performed by: FAMILY MEDICINE

## 2021-05-30 PROCEDURE — 80053 COMPREHEN METABOLIC PANEL: CPT | Performed by: FAMILY MEDICINE

## 2021-05-30 RX ORDER — IOPAMIDOL 755 MG/ML
500 INJECTION, SOLUTION INTRAVASCULAR ONCE
Status: COMPLETED | OUTPATIENT
Start: 2021-05-30 | End: 2021-05-30

## 2021-05-30 RX ORDER — ONDANSETRON 2 MG/ML
4 INJECTION INTRAMUSCULAR; INTRAVENOUS EVERY 30 MIN PRN
Status: DISCONTINUED | OUTPATIENT
Start: 2021-05-30 | End: 2021-05-31 | Stop reason: HOSPADM

## 2021-05-30 RX ORDER — SODIUM CHLORIDE 9 MG/ML
INJECTION, SOLUTION INTRAVENOUS CONTINUOUS
Status: DISCONTINUED | OUTPATIENT
Start: 2021-05-30 | End: 2021-05-31 | Stop reason: HOSPADM

## 2021-05-30 RX ADMIN — IOPAMIDOL 80 ML: 755 INJECTION, SOLUTION INTRAVENOUS at 20:52

## 2021-05-30 RX ADMIN — SODIUM CHLORIDE 1000 ML: 9 INJECTION, SOLUTION INTRAVENOUS at 20:05

## 2021-05-30 RX ADMIN — ONDANSETRON 4 MG: 2 INJECTION INTRAMUSCULAR; INTRAVENOUS at 20:05

## 2021-05-30 ASSESSMENT — ENCOUNTER SYMPTOMS
GASTROINTESTINAL NEGATIVE: 1
MYALGIAS: 1
TREMORS: 0
HEMATOLOGIC/LYMPHATIC NEGATIVE: 1
ENDOCRINE NEGATIVE: 1
PALPITATIONS: 0
SHORTNESS OF BREATH: 1
COUGH: 1
FATIGUE: 1
HEADACHES: 0
LIGHT-HEADEDNESS: 0
NERVOUS/ANXIOUS: 1
EYES NEGATIVE: 1
CHEST TIGHTNESS: 1

## 2021-05-30 NOTE — TELEPHONE ENCOUNTER
"Patient calling - says he is \"having a bunch of symptoms\" including difficulty breathing, chest pain, severe insomnia, cough, pounding heart and reduced sense of smell.  Chest pain and difficulty breathing started yesterday.    Triaged to disposition of Call 911 Now.    Kira Mohan RN  Triage Nurse Advisor    COVID 19 Nurse Triage Plan/Patient Instructions    Please be aware that novel coronavirus (COVID-19) may be circulating in the community. If you develop symptoms such as fever, cough, or SOB or if you have concerns about the presence of another infection including coronavirus (COVID-19), please contact your health care provider or visit [email or web address].     Disposition/Instructions    Call to EMS/911 recommended. Follow protocol based instructions.     Bring Your Own Device:  Please also bring your smart device(s) (smart phones, tablets, laptops) and their charging cables for your personal use and to communicate with your care team during your visit.    Thank you for taking steps to prevent the spread of this virus.  o Limit your contact with others.  o Wear a simple mask to cover your cough.  o Wash your hands well and often.    Resources    [facility]: About COVID-19: [email or web address]    CDC: What to Do If You're Sick: www.cdc.gov/coronavirus/2019-ncov/about/steps-when-sick.html    CDC: Ending Home Isolation: www.cdc.gov/coronavirus/2019-ncov/hcp/disposition-in-home-patients.html     CDC: Caring for Someone: www.cdc.gov/coronavirus/2019-ncov/if-you-are-sick/care-for-someone.html     [facility]: Interim Guidance for Hospital Discharge to Home: www.health.[facility].[place].us/diseases/coronavirus/hcp/hospdischarge.pdf    [facility] clinical trials (COVID-19 research studies): [email or web address]-trials     Below are the COVID-19 hotlines at the [place] Department of Health ([facility]). Interpreters are available.   o For health questions: Call 707-572-6965 or " 1-516.801.9121 (7 a.m. to 7 p.m.)  o For questions about schools and childcare: Call 244-797-8384 or 1-837.699.1178 (7 a.m. to 7 p.m.)   Reason for Disposition    [1] Chest pain lasts > 5 minutes AND [2] age > 30 AND [3] at least one cardiac risk factor (i.e., hypertension, diabetes, obesity, smoker or strong family history of heart disease)    Protocols used: CHEST PAIN-A-AH

## 2021-05-31 NOTE — ED NOTES
"Pt requested that writer contact his parents for a ride home.  Writer spoke with pt's mother. She stated that neither her or his father would come to pick him up stating that they took their night medications and can not come pick him up.  Mother continued to state that it was 10 at night and he lives in [place].  She continued on stating \"who takes him to his medical appointment, who took him to the covid shot.\"  Writer explained instructed to call his parents by pt and if they would not come to pick him up other avenues would be explored.  Pt stated that all his friends are on online and not in stated.  One time voucher given to pt and explained.  Called [facility] who will bring pt home.  IV removed.  Reviewed discharge instructions with pt.  No additional questions or concerns.     "

## 2021-05-31 NOTE — DISCHARGE INSTRUCTIONS
Your history and exam findings suggest that your symptoms are likely caused by an immune reaction to your COVID-19 immunization that you received Friday.  The symptoms should pass in the next day or 2.  Please return should you have increase or worsening symptoms such as increased shortness of breath, chest pain, fever, cough, or any skin rash.

## 2021-05-31 NOTE — ED PROVIDER NOTES
History     Chief Complaint   Patient presents with     Shortness of Breath     HPI  Brian Ortiz is a 35 year old male who presents to the ER via ambulance with concerns about cough symptoms for last 1 month.  Patient states that he has had some chest pain on and off since yesterday associated with decreased sense of taste and smell.  He is worried that he might have COVID-19.  Patient states that he had his second COVID-19 immunization on Friday.  He states several hours after that his increased shortness of breath started.  She denies fever.  He has had stomach upset but no vomiting.  He denies any diarrhea.  He denies any new skin rash.  Patient lives in his own home in [place], [place].    I reviewed the following nurse triage note:  Pt presents by EMS with concerns of covid symptoms.  Pt has been experiencing shortness of breath since Friday night.  Pt has had a cough for a month. Pt states that he has had a reduced sense of smell since yesterday.  Pt states that he has had chest pain since yesterday.  Pt was exposed to Covid via his  multiple times over the past month.  Pt had his second covid vaccine on 05/28/2021.  Pt received four baby aspirin by EMS.  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     Priority: Medium     ADHD (attention deficit hyperactivity disorder), combined type 07/12/2020     Priority: Medium     Benign non-nodular prostatic hyperplasia with lower urinary tract symptoms 08/31/2016     Priority: Medium     Hearing decreased 04/22/2015     Priority: Medium     BPPV (benign paroxysmal positional vertigo) 04/22/2015     Priority: Medium     Anal fissure 05/06/2011     Priority: Medium     Acne 05/06/2011     Priority: Medium     CARDIOVASCULAR SCREENING; LDL GOAL LESS  THAN 160 10/31/2010     Priority: Medium     Constipation 01/09/2008     Priority: Medium     Problem list name updated by automated process. Provider to review       Scoliosis associated with other condition 01/14/2005     Priority: Medium     Hearing loss 01/14/2005     Priority: Medium     Problem list name updated by automated process. Provider to review       Multiple congenital malformations, not elsewhere classified 01/14/2005     Priority: Medium     Diagnosis updated by automated process. Provider to review and confirm.          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 Tubes     SURGICAL HISTORY OF -   08/10/2004    Madibular midline ostectom;y with anterior ilac crest bone graft.  Bilateral sagittal split ramus osteotomy.  [facility]       Family History:    Family History   Adopted: Yes   Problem Relation Age of Onset     Substance Abuse Mother      Substance Abuse Father        Social History:  Marital Status:  Single [1]  Social History     Tobacco Use     Smoking status: Never Smoker     Smokeless tobacco: Never Used   Substance Use Topics     Alcohol use: No     Alcohol/week: 0.0 standard drinks     Drug use: No        Medications:    buPROPion (WELLBUTRIN SR) 150 MG 12 hr tablet  FANAPT 6 MG TABS tablet  hydrOXYzine (VISTARIL) 50 MG capsule  IBUPROFEN PO  pantoprazole (PROTONIX) 40 MG EC tablet  STRATTERA 60 MG OR CAPS  tamsulosin (FLOMAX) 0.4 MG capsule          Review of Systems   Constitutional: Positive for fatigue.   HENT: Positive for congestion and hearing  loss (Chronic - wears hearing aid).    Eyes: Negative.    Respiratory: Positive for cough, chest tightness and shortness of breath.    Cardiovascular: Negative for palpitations and leg swelling.   Gastrointestinal: Negative.    Endocrine: Negative.    Genitourinary: Negative.    Musculoskeletal: Positive for myalgias.   Skin: Negative.  Negative for rash.   Neurological: Negative for tremors, light-headedness and headaches.   Hematological: Negative.    Psychiatric/Behavioral: The patient is nervous/anxious.    All other systems reviewed and are negative.      Physical Exam   BP: (!) 130/90  Pulse: 105  Temp: 98.8  F (37.1  C)  Resp: 20  Weight: 83 kg (183 lb)  SpO2: 98 %      Physical Exam  Vitals signs and nursing note reviewed.   Constitutional:       General: He is in acute distress.      Appearance: He is diaphoretic. He is not ill-appearing or toxic-appearing.   HENT:      Head: Normocephalic and atraumatic.      Comments: Patient with hearing aid in place.  Eyes:      Extraocular Movements: Extraocular movements intact.      Pupils: Pupils are equal, round, and reactive to light.   Neck:      Musculoskeletal: Normal range of motion and neck supple.      Vascular: No JVD.   Cardiovascular:      Rate and Rhythm: Tachycardia present.      Pulses: Normal pulses.   Pulmonary:      Effort: Pulmonary effort is normal.      Breath sounds: Decreased breath sounds present. No wheezing, rhonchi or rales.   Chest:      Chest wall: No crepitus or edema.   Abdominal:      General: Bowel sounds are normal.      Palpations: Abdomen is soft.      Tenderness: There is no abdominal tenderness. There is no guarding or rebound.   Musculoskeletal:      Right lower leg: He exhibits no tenderness. No edema.      Left lower leg: He exhibits no tenderness. No edema.   Skin:     Capillary Refill: Capillary refill takes less than 2 seconds.      Findings: No rash.   Neurological:      Mental Status: He is alert and oriented to person,  place, and time.         ED Course        Procedures               EKG Interpretation:      Interpreted by Robby Velasco DO  Time reviewed: 19:35  Symptoms at time of EKG: SOB   Rhythm: sinus tachycardia  Rate: 110-120  Axis: Normal  Ectopy: none  Conduction: normal  ST Segments/ T Waves: No acute ischemic changes  Q Waves: none  Comparison to prior: Unchanged from 6/11/2020    Clinical Impression: sinus tachycardia      Critical Care time:  none            Results for orders placed or performed during the hospital encounter of 05/30/21 (from the past 24 hour(s))   Symptomatic SARS-CoV-2 COVID-19 Virus (Coronavirus) by PCR    Specimen: Nasopharyngeal   Result Value Ref Range    SARS-CoV-2 Virus Specimen Source Nasopharyngeal     SARS-CoV-2 PCR Result NEGATIVE     SARS-CoV-2 PCR Comment (Note)    CBC with platelets differential   Result Value Ref Range    WBC 4.7 4.0 - 11.0 10e9/L    RBC Count 5.24 4.4 - 5.9 10e12/L    Hemoglobin 14.0 13.3 - 17.7 g/dL    Hematocrit 40.8 40.0 - 53.0 %    MCV 78 78 - 100 fl    MCH 26.7 26.5 - 33.0 pg    MCHC 34.3 31.5 - 36.5 g/dL    RDW 14.0 10.0 - 15.0 %    Platelet Count 173 150 - 450 10e9/L    Diff Method Automated Method     % Neutrophils 59.9 %    % Lymphocytes 25.8 %    % Monocytes 10.1 %    % Eosinophils 3.2 %    % Basophils 0.4 %    % Immature Granulocytes 0.6 %    Nucleated RBCs 0 0 /100    Absolute Neutrophil 2.8 1.6 - 8.3 10e9/L    Absolute Lymphocytes 1.2 0.8 - 5.3 10e9/L    Absolute Monocytes 0.5 0.0 - 1.3 10e9/L    Absolute Eosinophils 0.2 0.0 - 0.7 10e9/L    Absolute Basophils 0.0 0.0 - 0.2 10e9/L    Abs Immature Granulocytes 0.0 0 - 0.4 10e9/L    Absolute Nucleated RBC 0.0    D dimer quantitative   Result Value Ref Range    D Dimer 1.2 (H) 0.0 - 0.50 ug/ml FEU   Comprehensive metabolic panel   Result Value Ref Range    Sodium 135 133 - 144 mmol/L    Potassium 3.8 3.4 - 5.3 mmol/L    Chloride 104 94 - 109 mmol/L    Carbon Dioxide 26 20 - 32 mmol/L    Anion Gap  5 3 - 14 mmol/L    Glucose 126 (H) 70 - 99 mg/dL    Urea Nitrogen 11 7 - 30 mg/dL    Creatinine 1.21 0.66 - 1.25 mg/dL    GFR Estimate 76 >60 mL/min/[1.73_m2]    GFR Estimate If Black 88 >60 mL/min/[1.73_m2]    Calcium 8.5 8.5 - 10.1 mg/dL    Bilirubin Total 0.3 0.2 - 1.3 mg/dL    Albumin 3.2 (L) 3.4 - 5.0 g/dL    Protein Total 7.1 6.8 - 8.8 g/dL    Alkaline Phosphatase 86 40 - 150 U/L    ALT 43 0 - 70 U/L    AST 32 0 - 45 U/L   Troponin I   Result Value Ref Range    Troponin I ES <0.015 0.000 - 0.045 ug/L   Blood gas venous   Result Value Ref Range    Ph Venous 7.43 7.32 - 7.43 pH    PCO2 Venous 41 40 - 50 mm Hg    PO2 Venous 60 (H) 25 - 47 mm Hg    Bicarbonate Venous 27 21 - 28 mmol/L    Base Excess Venous 2.3 mmol/L    FIO2 21    CRP inflammation   Result Value Ref Range    CRP Inflammation 128.0 (H) 0.0 - 8.0 mg/L   Lactic acid whole blood   Result Value Ref Range    Lactic Acid 1.0 0.7 - 2.0 mmol/L   XR Chest Port 1 View    Narrative    CHEST ONE VIEW  5/30/2021 8:16 PM     HISTORY: SOB, cough.    COMPARISON: June 11, 2020      Impression    IMPRESSION: No acute disease.    JAMIE ESPINOSA MD   CT Chest Pulmonary Embolism w Contrast    Narrative    CT CHEST PULMONARY EMBOLISM WITH CONTRAST 5/30/2021 9:12 PM    CLINICAL HISTORY: Chest Pain. Shortness of breath. PE suspected,  low/intermediate prob, positive D-dimer.    TECHNIQUE: CT angiogram chest during arterial phase injection IV  contrast. 2D and 3D MIP reconstructions were performed by the CT  technologist. Dose reduction techniques were used.     CONTRAST: Isovue 370, 80mL    COMPARISON: None.    FINDINGS:  ANGIOGRAM CHEST: Pulmonary arteries are normal caliber and negative  for pulmonary emboli. Thoracic aorta is negative for dissection. No CT  evidence of right heart strain.    LUNGS AND PLEURA: Trace pleural fluid on the right. Mild associated  compressive atelectasis. No left pleural effusion.    MEDIASTINUM/AXILLAE:  Enlarged lymph nodes in the left  axilla with a  representative lymph node measuring 1.9 x 1.8 cm. Some may have normal  fatty sierra.    UPPER ABDOMEN: No acute findings.    MUSCULOSKELETAL: No frankly destructive bony lesions.      Impression    IMPRESSION:  1.  No pulmonary embolism demonstrated.  2.  Nonspecific left axillary adenopathy.  3.  Trace pleural fluid on the right.    JAMIE ESPINOSA MD   UA reflex to Microscopic and Culture    Specimen: Midstream Urine   Result Value Ref Range    Color Urine Yellow     Appearance Urine Clear     Glucose Urine 50 (A) NEG^Negative mg/dL    Bilirubin Urine Negative NEG^Negative    Ketones Urine Negative NEG^Negative mg/dL    Specific Gravity Urine >1.035 (H) 1.003 - 1.035    Blood Urine Negative NEG^Negative    pH Urine 5.0 5.0 - 7.0 pH    Protein Albumin Urine 30 (A) NEG^Negative mg/dL    Urobilinogen mg/dL 0.0 0.0 - 2.0 mg/dL    Nitrite Urine Negative NEG^Negative    Leukocyte Esterase Urine Negative NEG^Negative    Source Midstream Urine     RBC Urine 1 0 - 2 /HPF    WBC Urine 1 0 - 5 /HPF    Squamous Epithelial /HPF Urine <1 0 - 1 /HPF    Mucous Urine Present (A) NEG^Negative /LPF    Hyaline Casts 1 0 - 2 /LPF       Medications   0.9% sodium chloride BOLUS (0 mLs Intravenous Stopped 5/30/21 2201)     Followed by   sodium chloride 0.9% infusion (has no administration in time range)   ondansetron (ZOFRAN) injection 4 mg (4 mg Intravenous Given 5/30/21 2005)   sodium chloride (PF) 0.9% PF flush 100 mL (70 mLs Intracatheter Given 5/30/21 2052)   sodium chloride (PF) 0.9% PF flush 3 mL (10 mLs Intravenous Given 5/30/21 2051)   iopamidol (ISOVUE-370) solution 500 mL (80 mLs Intravenous Given 5/30/21 2052)       Assessments & Plan (with Medical Decision Making)  35-year-old to the ER via ambulance from his home secondary to concerns of shortness of breath, decreased sense of smell, and some chest pressure with this cough.  Patient had a second COVID-19 immunization on Friday and was several hours after that  that his symptoms started.  We did fairly extensive work-up without specific reason for symptoms.  I suspect that his symptoms are likely an immune response due to his second COVID-19 immunization.  I spent time with the patient in discussion of this immune reaction and we talked about signs and symptoms that would be of concern and when to return to the ER if noted.  He felt comfortable with return home at this time.     I have reviewed the nursing notes.    I have reviewed the findings, diagnosis, plan and need for follow up with the patient.       Final diagnoses:   Immunization reaction, initial encounter       5/30/2021   [facility] EMERGENCY DEPT     Robby Velasco, DO  05/30/21 [id number]

## 2021-05-31 NOTE — ED TRIAGE NOTES
Pt presents by EMS with concerns of covid symptoms.  Pt has been experiencing shortness of breath since Friday night.  Pt has had a cough for a month. Pt states that he has had a reduced sense of smell since yesterday.  Pt states that he has had chest pain since yesterday.  Pt was exposed to Covid via his  multiple times over the past month.  Pt had his second covid vaccine on 05/28/2021.  Pt received four baby aspirin by EMS.

## 2021-06-01 ENCOUNTER — PATIENT OUTREACH (OUTPATIENT)
Dept: CARE COORDINATION | Facility: CLINIC | Age: 36
End: 2021-06-01

## 2021-06-01 DIAGNOSIS — Z71.89 OTHER SPECIFIED COUNSELING: ICD-10-CM

## 2021-06-01 NOTE — LETTER
[facility] CARE COORDINATION  [address] [id number]    June 2, 2021    Brian Ortiz  [address]   [place] [address]      Dear Brian,    I am a clinic community health worker who works with Duy Canseco MD at [facility]. I have been trying to reach you recently to introduce Clinic Care Coordination and to see if there was anything I could assist you with.  Below is a description of clinic care coordination and how I can further assist you.      The clinic care coordination team is made up of a registered nurse,  and community health worker who understand the health care system. The goal of clinic care coordination is to help you manage your health and improve access to the health care system in the most efficient manner. The team can assist you in meeting your health care goals by providing education, coordinating services, strengthening the communication among your providers and supporting you with any resource needs.    Please feel free to contact me at [phone] with any questions or concerns. We are focused on providing you with the highest-quality healthcare experience possible and that all starts with you.     Sincerely,       YNES Pink  Clinic Care Coordination  [facility]

## 2021-06-01 NOTE — PROGRESS NOTES
Clinic Care Coordination Contact  [facility]/Voicemail       Clinical Data: Care Coordinator Outreach  Outreach attempted x 1.  Left message on patient's voicemail with call back information and requested return call.  Plan: Care Coordinator will try to reach patient again in 1-2 business days.

## 2021-06-02 NOTE — PROGRESS NOTES
Clinic Care Coordination Contact  [facility]/Voicemail       Clinical Data: Care Coordinator Outreach  Outreach attempted x 2.  Left message on patient's voicemail with call back information and requested return call.  Plan: Care Coordinator will send unable to contact letter with care coordinator contact information via [employer]. Care Coordinator will do no further outreaches at this time.

## 2021-06-07 ENCOUNTER — PATIENT OUTREACH (OUTPATIENT)
Dept: CARE COORDINATION | Facility: CLINIC | Age: 36
End: 2021-06-07

## 2021-06-07 NOTE — PROGRESS NOTES
Clinic Care Coordination Contact  Community Health Worker Initial Outreach    CHW Initial Information Gathering:  Referral Source: ED Follow-Up  Preferred Hospital: [facility], [place]  [phone]  Preferred Urgent Care: [facility], [phone]  Current living arrangement:: I live alone  Type of residence:: Apartment  Community Resources: County Programs, , Other (see comment)([facility], [employer])  Supplies used at home:: None  Equipment Currently Used at Home: none  Informal Support system:: Parent  Transportation means:: Medical transport, Other([facility])       Patient accepts CC: Yes. Patient scheduled for assessment with the RN on 6/9/21 at 3:00 pm. Patient noted desire to discuss discussing his insomnia. The patient stated that he was informed to try an over the counter sleep aid, but he stated that the one he looked at would have an affect on his depression.    The patient also is working with an independent living specialists.

## 2021-06-11 ENCOUNTER — PATIENT OUTREACH (OUTPATIENT)
Dept: CARE COORDINATION | Facility: CLINIC | Age: 36
End: 2021-06-11

## 2021-06-11 NOTE — PROGRESS NOTES
Clinic Care Coordination Contact  [facility]/Voicemail       Clinical Data: Care Coordinator Outreach  Outreach attempted x 1.  Left message on patient's voicemail with call back information and requested return call.  Plan:. Care Coordinator will try to reach patient again in 1-2 business days.

## 2021-06-16 DIAGNOSIS — K21.00 GASTROESOPHAGEAL REFLUX DISEASE WITH ESOPHAGITIS: ICD-10-CM

## 2021-06-17 RX ORDER — PANTOPRAZOLE SODIUM 40 MG/1
TABLET, DELAYED RELEASE ORAL
Qty: 30 TABLET | Refills: 1 | Status: SHIPPED | OUTPATIENT
Start: 2021-06-17 | End: 2021-08-13

## 2021-06-21 ENCOUNTER — MEDICAL CORRESPONDENCE (OUTPATIENT)
Dept: HEALTH INFORMATION MANAGEMENT | Facility: CLINIC | Age: 36
End: 2021-06-21

## 2021-06-25 DIAGNOSIS — Z79.810 CARE RELATED TO CURRENT TAMOXIFEN USE: Primary | ICD-10-CM

## 2021-08-04 ENCOUNTER — OFFICE VISIT (OUTPATIENT)
Dept: FAMILY MEDICINE | Facility: CLINIC | Age: 36
End: 2021-08-04
Payer: MEDICAID

## 2021-08-04 VITALS
BODY MASS INDEX: 30.57 KG/M2 | HEART RATE: 114 BPM | SYSTOLIC BLOOD PRESSURE: 126 MMHG | RESPIRATION RATE: 20 BRPM | HEIGHT: 63 IN | OXYGEN SATURATION: 100 % | WEIGHT: 172.56 LBS | TEMPERATURE: 97.9 F | DIASTOLIC BLOOD PRESSURE: 74 MMHG

## 2021-08-04 DIAGNOSIS — L08.9 SKIN PUSTULE: ICD-10-CM

## 2021-08-04 DIAGNOSIS — Z00.00 ROUTINE GENERAL MEDICAL EXAMINATION AT A HEALTH CARE FACILITY: Primary | ICD-10-CM

## 2021-08-04 DIAGNOSIS — Z79.810 CARE RELATED TO CURRENT TAMOXIFEN USE: ICD-10-CM

## 2021-08-04 DIAGNOSIS — N41.0 ACUTE PROSTATITIS: ICD-10-CM

## 2021-08-04 LAB
ALBUMIN SERPL-MCNC: 3.8 G/DL (ref 3.4–5)
ALP SERPL-CCNC: 92 U/L (ref 40–150)
ALT SERPL W P-5'-P-CCNC: 39 U/L (ref 0–70)
ANION GAP SERPL CALCULATED.3IONS-SCNC: 7 MMOL/L (ref 3–14)
AST SERPL W P-5'-P-CCNC: 26 U/L (ref 0–45)
BILIRUB SERPL-MCNC: 0.3 MG/DL (ref 0.2–1.3)
BUN SERPL-MCNC: 18 MG/DL (ref 7–30)
CALCIUM SERPL-MCNC: 8.6 MG/DL (ref 8.5–10.1)
CHLORIDE BLD-SCNC: 106 MMOL/L (ref 94–109)
CHOLEST SERPL-MCNC: 245 MG/DL
CO2 SERPL-SCNC: 25 MMOL/L (ref 20–32)
CREAT SERPL-MCNC: 1.37 MG/DL (ref 0.66–1.25)
ERYTHROCYTE [DISTWIDTH] IN BLOOD BY AUTOMATED COUNT: 13.4 % (ref 10–15)
FASTING STATUS PATIENT QL REPORTED: NO
GFR SERPL CREATININE-BSD FRML MDRD: 66 ML/MIN/1.73M2
GLUCOSE BLD-MCNC: 89 MG/DL (ref 70–99)
HBA1C MFR BLD: 5.3 % (ref 0–5.6)
HCT VFR BLD AUTO: 44.9 % (ref 40–53)
HDLC SERPL-MCNC: 48 MG/DL
HGB BLD-MCNC: 14.8 G/DL (ref 13.3–17.7)
LDLC SERPL CALC-MCNC: 164 MG/DL
MCH RBC QN AUTO: 26.7 PG (ref 26.5–33)
MCHC RBC AUTO-ENTMCNC: 33 G/DL (ref 31.5–36.5)
MCV RBC AUTO: 81 FL (ref 78–100)
NONHDLC SERPL-MCNC: 197 MG/DL
PLATELET # BLD AUTO: 203 10E3/UL (ref 150–450)
POTASSIUM BLD-SCNC: 4.1 MMOL/L (ref 3.4–5.3)
PROT SERPL-MCNC: 7.9 G/DL (ref 6.8–8.8)
RBC # BLD AUTO: 5.54 10E6/UL (ref 4.4–5.9)
SODIUM SERPL-SCNC: 138 MMOL/L (ref 133–144)
TRIGL SERPL-MCNC: 164 MG/DL
TSH SERPL DL<=0.005 MIU/L-ACNC: 5.71 MU/L (ref 0.4–4)
WBC # BLD AUTO: 6 10E3/UL (ref 4–11)

## 2021-08-04 PROCEDURE — 84443 ASSAY THYROID STIM HORMONE: CPT | Performed by: FAMILY MEDICINE

## 2021-08-04 PROCEDURE — 83036 HEMOGLOBIN GLYCOSYLATED A1C: CPT | Performed by: FAMILY MEDICINE

## 2021-08-04 PROCEDURE — 82306 VITAMIN D 25 HYDROXY: CPT | Performed by: FAMILY MEDICINE

## 2021-08-04 PROCEDURE — 99214 OFFICE O/P EST MOD 30 MIN: CPT | Mod: 25 | Performed by: FAMILY MEDICINE

## 2021-08-04 PROCEDURE — 80053 COMPREHEN METABOLIC PANEL: CPT | Performed by: FAMILY MEDICINE

## 2021-08-04 PROCEDURE — 80061 LIPID PANEL: CPT | Performed by: FAMILY MEDICINE

## 2021-08-04 PROCEDURE — 85027 COMPLETE CBC AUTOMATED: CPT | Performed by: FAMILY MEDICINE

## 2021-08-04 PROCEDURE — 36415 COLL VENOUS BLD VENIPUNCTURE: CPT | Performed by: FAMILY MEDICINE

## 2021-08-04 PROCEDURE — 99395 PREV VISIT EST AGE 18-39: CPT | Performed by: FAMILY MEDICINE

## 2021-08-04 RX ORDER — ATOMOXETINE 60 MG/1
60 CAPSULE ORAL DAILY
COMMUNITY
End: 2022-08-23

## 2021-08-04 RX ORDER — DOXYCYCLINE HYCLATE 100 MG
100 TABLET ORAL 2 TIMES DAILY
Qty: 30 TABLET | Refills: 0 | Status: SHIPPED | OUTPATIENT
Start: 2021-08-04 | End: 2021-08-26

## 2021-08-04 ASSESSMENT — ENCOUNTER SYMPTOMS
COUGH: 1
WEAKNESS: 1
NERVOUS/ANXIOUS: 1
PALPITATIONS: 1
FREQUENCY: 1

## 2021-08-04 ASSESSMENT — MIFFLIN-ST. JEOR: SCORE: 1607.87

## 2021-08-04 ASSESSMENT — PATIENT HEALTH QUESTIONNAIRE - PHQ9
SUM OF ALL RESPONSES TO PHQ QUESTIONS 1-9: 23
SUM OF ALL RESPONSES TO PHQ QUESTIONS 1-9: 23
10. IF YOU CHECKED OFF ANY PROBLEMS, HOW DIFFICULT HAVE THESE PROBLEMS MADE IT FOR YOU TO DO YOUR WORK, TAKE CARE OF THINGS AT HOME, OR GET ALONG WITH OTHER PEOPLE: VERY DIFFICULT

## 2021-08-04 ASSESSMENT — PAIN SCALES - GENERAL: PAINLEVEL: NO PAIN (1)

## 2021-08-04 NOTE — PATIENT INSTRUCTIONS
Preventive Health Recommendations  Male Ages 26 - 39    Yearly exam:             See your health care provider every year in order to  o   Review health changes.   o   Discuss preventive care.    o   Review your medicines if your doctor has prescribed any.    You should be tested each year for STDs (sexually transmitted diseases), if you re at risk.     After age 35, talk to your provider about cholesterol testing. If you are at risk for heart disease, have your cholesterol tested at least every 5 years.     If you are at risk for diabetes, you should have a diabetes test (fasting glucose).  Shots: Get a flu shot each year. Get a tetanus shot every 10 years.     Nutrition:    Eat at least 5 servings of fruits and vegetables daily.     Eat whole-grain bread, whole-wheat pasta and brown rice instead of white grains and rice.     Get adequate Calcium and Vitamin D.     Lifestyle    Exercise for at least 150 minutes a week (30 minutes a day, 5 days a week). This will help you control your weight and prevent disease.     Limit alcohol to one drink per day.     No smoking.     Wear sunscreen to prevent skin cancer.     See your dentist every six months for an exam and cleaning.   Take doxycycline see if this helps with your genital symptoms and your skin rash.  If it does not we will have to have you see urology in consult and dermatology in consult    Recommend Zyrtec 10 mg daily or Claritin 10 mg daily to see if this helps with the sneezing.

## 2021-08-05 LAB — DEPRECATED CALCIDIOL+CALCIFEROL SERPL-MC: 29 UG/L (ref 20–75)

## 2021-08-05 ASSESSMENT — PATIENT HEALTH QUESTIONNAIRE - PHQ9: SUM OF ALL RESPONSES TO PHQ QUESTIONS 1-9: 23

## 2021-08-12 DIAGNOSIS — K21.00 GASTROESOPHAGEAL REFLUX DISEASE WITH ESOPHAGITIS: ICD-10-CM

## 2021-08-13 RX ORDER — PANTOPRAZOLE SODIUM 40 MG/1
TABLET, DELAYED RELEASE ORAL
Qty: 30 TABLET | Refills: 5 | Status: SHIPPED | OUTPATIENT
Start: 2021-08-13 | End: 2022-02-01

## 2021-08-25 ENCOUNTER — VIRTUAL VISIT (OUTPATIENT)
Dept: FAMILY MEDICINE | Facility: CLINIC | Age: 36
End: 2021-08-25
Payer: MEDICAID

## 2021-08-25 DIAGNOSIS — E78.5 HYPERLIPIDEMIA LDL GOAL <130: ICD-10-CM

## 2021-08-25 DIAGNOSIS — N41.0 ACUTE PROSTATITIS: ICD-10-CM

## 2021-08-25 DIAGNOSIS — L08.9 SKIN PUSTULE: ICD-10-CM

## 2021-08-25 DIAGNOSIS — E03.8 OTHER SPECIFIED HYPOTHYROIDISM: Primary | ICD-10-CM

## 2021-08-25 PROCEDURE — 99442 PR PHYSICIAN TELEPHONE EVALUATION 11-20 MIN: CPT | Performed by: FAMILY MEDICINE

## 2021-08-25 RX ORDER — ATORVASTATIN CALCIUM 20 MG/1
20 TABLET, FILM COATED ORAL DAILY
Qty: 30 TABLET | Refills: 1 | Status: SHIPPED | OUTPATIENT
Start: 2021-08-25 | End: 2021-09-30

## 2021-08-25 RX ORDER — LEVOTHYROXINE SODIUM 50 UG/1
50 TABLET ORAL DAILY
Qty: 30 TABLET | Refills: 2 | Status: SHIPPED | OUTPATIENT
Start: 2021-08-25 | End: 2021-11-05

## 2021-08-25 NOTE — PROGRESS NOTES
Brian is a 35 year old who is being evaluated via a billable telephone visit.      What phone number would you like to be contacted at?  [phone]  How would you like to obtain your AVS? MyChart    Assessment & Plan     Other specified hypothyroidism  This is new.  Not sure if potentially related to medications he is on.  We will start him on Synthroid 50 mcg a day recheck in 2 months.  - levothyroxine (SYNTHROID/LEVOTHROID) 50 MCG tablet; Take 1 tablet (50 mcg) by mouth daily  - **TSH with free T4 reflex FUTURE 2mo; Future    Hyperlipidemia LDL goal <130  Discussed these findings with him.  Fairly high and also triglycerides told to watch his diet we will start him on Lipitor 20 mg a day and recheck in 2 months.  Follow-up then.  - Lipid panel reflex to direct LDL Fasting; Future  - **ALT FUTURE 2mo; Future  - **AST FUTURE 2mo; Future  - atorvastatin (LIPITOR) 20 MG tablet; Take 1 tablet (20 mg) by mouth daily                 No follow-ups on file.    Duy Canseco MD  [facility]   Brian is a 35 year old who presents for the following health issues     HPI     Review Lab results       Review of Systems   Constitutional, HEENT, cardiovascular, pulmonary, gi and gu systems are negative, except as otherwise noted.      Objective           Vitals:  No vitals were obtained today due to virtual visit.    Physical Exam   healthy, alert and no distress  PSYCH: Alert and oriented times 3; coherent speech, normal   rate and volume, able to articulate logical thoughts, able   to abstract reason, no tangential thoughts, no hallucinations   or delusions  His affect is normal and pleasant  RESP: No cough, no audible wheezing, able to talk in full sentences  Remainder of exam unable to be completed due to telephone visits    Office Visit on 08/04/2021   Component Date Value Ref Range Status     Vitamin D, Total (25-Hydroxy) 08/04/2021 29  20 - 75 ug/L Final     TSH 08/04/2021 5.71*  0.40 - 4.00 mU/L Final     Hemoglobin A1C 08/04/2021 5.3  0.0 - 5.6 % Final    Normal <5.7%   Prediabetes 5.7-6.4%    Diabetes 6.5% or higher     Note: Adopted from ADA consensus guidelines.     Cholesterol 08/04/2021 245* <200 mg/dL Final    Age 0-19 years  Desirable: <170 mg/dL  Borderline high:  170-199 mg/dl  High:            >199 mg/dl    Age 20 years and older  Desirable: <200 mg/dL     Triglycerides 08/04/2021 164* <150 mg/dL Final    0-9 years:  Normal:    Less than 75 mg/dL  Borderline high:  75-99 mg/dL  High:             Greater than or equal to 100 mg/dL    0-19 years:  Normal:    Less than 90 mg/dL  Borderline high:   mg/dL  High:             Greater than or equal to 130 mg/dL    20 years and older:  Normal:    Less than 150 mg/dL  Borderline high:  150-199 mg/dL  High:             200-499 mg/dL  Very high:   Greater than or equal to 500 mg/dL     Direct Measure HDL 08/04/2021 48  >=40 mg/dL Final    0-19 years:       Greater than or equal to 45 mg/dL   Low: Less than 40 mg/dL   Borderline low: 40-44 mg/dL     20 years and older:   Female: Greater than or equal to 50 mg/dL   Male:   Greater than or equal to 40 mg/dL          LDL Cholesterol Calculated 08/04/2021 164* <=100 mg/dL Final    Age 0-19 years:  Desirable: 0-110 mg/dL   Borderline high: 110-129 mg/dL   High: >= 130 mg/dL    Age 20 years and older:  Desirable: <100mg/dL  Above desirable: 100-129 mg/dL   Borderline high: 130-159 mg/dL   High: 160-189 mg/dL   Very high: >= 190 mg/dL     Non HDL Cholesterol 08/04/2021 197* <130 mg/dL Final    0-19 years:  Desirable:          Less than 120 mg/dL  Borderline high:   120-144 mg/dL  High:                   Greater than or equal to 145 mg/dL    20 years and older:  Desirable:          130 mg/dL  Above Desirable: 130-159 mg/dL  Borderline high:   160-189 mg/dL  High:               190-219 mg/dL  Very high:     Greater than or equal to 220 mg/dL     Patient Fasting > 8hrs? 08/04/2021 No   Final      Sodium 08/04/2021 138  133 - 144 mmol/L Final     Potassium 08/04/2021 4.1  3.4 - 5.3 mmol/L Final     Chloride 08/04/2021 106  94 - 109 mmol/L Final     Carbon Dioxide (CO2) 08/04/2021 25  20 - 32 mmol/L Final     Anion Gap 08/04/2021 7  3 - 14 mmol/L Final     Urea Nitrogen 08/04/2021 18  7 - 30 mg/dL Final     Creatinine 08/04/2021 1.37* 0.66 - 1.25 mg/dL Final     Calcium 08/04/2021 8.6  8.5 - 10.1 mg/dL Final     Glucose 08/04/2021 89  70 - 99 mg/dL Final     Alkaline Phosphatase 08/04/2021 92  40 - 150 U/L Final     AST 08/04/2021 26  0 - 45 U/L Final     ALT 08/04/2021 39  0 - 70 U/L Final     Protein Total 08/04/2021 7.9  6.8 - 8.8 g/dL Final     Albumin 08/04/2021 3.8  3.4 - 5.0 g/dL Final     Bilirubin Total 08/04/2021 0.3  0.2 - 1.3 mg/dL Final     GFR Estimate 08/04/2021 66  >60 mL/min/1.73m2 Final    As of July 11, 2021, eGFR is calculated by the CKD-EPI creatinine equation, without race adjustment. eGFR can be influenced by muscle mass, exercise, and diet. The reported eGFR is an estimation only and is only applicable if the renal function is stable.     WBC Count 08/04/2021 6.0  4.0 - 11.0 10e3/uL Final     RBC Count 08/04/2021 5.54  4.40 - 5.90 10e6/uL Final     Hemoglobin 08/04/2021 14.8  13.3 - 17.7 g/dL Final     Hematocrit 08/04/2021 44.9  40.0 - 53.0 % Final     MCV 08/04/2021 81  78 - 100 fL Final     MCH 08/04/2021 26.7  26.5 - 33.0 pg Final     MCHC 08/04/2021 33.0  31.5 - 36.5 g/dL Final     RDW 08/04/2021 13.4  10.0 - 15.0 % Final     Platelet Count 08/04/2021 203  150 - 450 10e3/uL Final               Phone call duration: 11 minutes

## 2021-08-26 RX ORDER — DOXYCYCLINE HYCLATE 100 MG
TABLET ORAL
Qty: 30 TABLET | Refills: 0 | Status: SHIPPED | OUTPATIENT
Start: 2021-08-26 | End: 2022-08-23

## 2021-08-26 NOTE — TELEPHONE ENCOUNTER
Pending Prescriptions:                       Disp   Refills    doxycycline hyclate (VIBRA-TABS) 100 MG ta*30 tab*0        Sig: TAKE 1 TABLET BY MOUTH TWICE A DAY    Routing refill request to provider for review/approval because:  Drug not on the [facility] refill protocol

## 2021-09-27 DIAGNOSIS — E78.5 HYPERLIPIDEMIA LDL GOAL <130: ICD-10-CM

## 2021-09-30 RX ORDER — ATORVASTATIN CALCIUM 20 MG/1
TABLET, FILM COATED ORAL
Qty: 30 TABLET | Refills: 3 | Status: SHIPPED | OUTPATIENT
Start: 2021-09-30 | End: 2022-02-01

## 2021-09-30 NOTE — TELEPHONE ENCOUNTER
Prescription approved per [facility] Refill Protocol.    KATIE AmezcuaN, RN  [facility]

## 2021-10-23 ENCOUNTER — HEALTH MAINTENANCE LETTER (OUTPATIENT)
Age: 36
End: 2021-10-23

## 2021-11-04 DIAGNOSIS — E03.8 OTHER SPECIFIED HYPOTHYROIDISM: ICD-10-CM

## 2021-11-05 RX ORDER — LEVOTHYROXINE SODIUM 50 UG/1
TABLET ORAL
Qty: 30 TABLET | Refills: 2 | Status: SHIPPED | OUTPATIENT
Start: 2021-11-05 | End: 2022-02-01

## 2021-11-05 NOTE — TELEPHONE ENCOUNTER
Levothyroxine  Routing refill request to provider for review/approval because:  Labs out of range:  TSH    Alysa Saleem RN

## 2022-01-28 DIAGNOSIS — E78.5 HYPERLIPIDEMIA LDL GOAL <130: ICD-10-CM

## 2022-01-28 DIAGNOSIS — K21.00 GASTROESOPHAGEAL REFLUX DISEASE WITH ESOPHAGITIS: ICD-10-CM

## 2022-01-28 DIAGNOSIS — E03.8 OTHER SPECIFIED HYPOTHYROIDISM: ICD-10-CM

## 2022-02-01 RX ORDER — ATORVASTATIN CALCIUM 20 MG/1
TABLET, FILM COATED ORAL
Qty: 30 TABLET | Refills: 3 | Status: SHIPPED | OUTPATIENT
Start: 2022-02-01 | End: 2022-05-20

## 2022-02-01 RX ORDER — LEVOTHYROXINE SODIUM 50 UG/1
TABLET ORAL
Qty: 30 TABLET | Refills: 2 | Status: SHIPPED | OUTPATIENT
Start: 2022-02-01 | End: 2022-04-22

## 2022-02-01 RX ORDER — PANTOPRAZOLE SODIUM 40 MG/1
TABLET, DELAYED RELEASE ORAL
Qty: 30 TABLET | Refills: 5 | Status: SHIPPED | OUTPATIENT
Start: 2022-02-01 | End: 2022-07-18

## 2022-02-01 NOTE — TELEPHONE ENCOUNTER
Pending Prescriptions:                       Disp   Refills    levothyroxine (SYNTHROID/LEVOTHROID) 50 MC*30 tab*2        Sig: TAKE 1 TABLET BY MOUTH DAILY    Routing refill request to provider for review/approval because:  Labs out of range:  TSH > 12 months old  TSH   Date Value Ref Range Status   08/04/2021 5.71 (H) 0.40 - 4.00 mU/L Final   06/24/2015 1.79 0.40 - 4.00 mU/L Final

## 2022-04-21 DIAGNOSIS — E03.8 OTHER SPECIFIED HYPOTHYROIDISM: ICD-10-CM

## 2022-04-22 RX ORDER — LEVOTHYROXINE SODIUM 50 UG/1
50 TABLET ORAL DAILY
Qty: 30 TABLET | Refills: 0 | Status: SHIPPED | OUTPATIENT
Start: 2022-04-22 | End: 2022-05-23

## 2022-04-22 NOTE — TELEPHONE ENCOUNTER
Pending Prescriptions:                       Disp   Refills    levothyroxine (SYNTHROID/LEVOTHROID) 50 MC*30 tab*0        Sig: Take 1 tablet (50 mcg) by mouth daily Labs needed for           additional refills.    Routing refill request to provider for review/approval because:  Labs out of range:    TSH   Date Value Ref Range Status   08/04/2021 5.71 (H) 0.40 - 4.00 mU/L Final   06/24/2015 1.79 0.40 - 4.00 mU/L Final

## 2022-05-19 DIAGNOSIS — E78.5 HYPERLIPIDEMIA LDL GOAL <130: ICD-10-CM

## 2022-05-19 DIAGNOSIS — E03.8 OTHER SPECIFIED HYPOTHYROIDISM: ICD-10-CM

## 2022-05-20 RX ORDER — ATORVASTATIN CALCIUM 20 MG/1
TABLET, FILM COATED ORAL
Qty: 30 TABLET | Refills: 3 | Status: SHIPPED | OUTPATIENT
Start: 2022-05-20 | End: 2022-09-15

## 2022-05-20 NOTE — TELEPHONE ENCOUNTER
Pending Prescriptions:                       Disp   Refills    levothyroxine (SYNTHROID/LEVOTHROID) 50 MC*30 tab*0        Sig: TAKE 1 TABLET BY MOUTH DAILY    Signed Prescriptions:                        Disp   Refills    atorvastatin (LIPITOR) 20 MG tablet        30 tab*3        Sig: TAKE 1 TABLET BY MOUTH DAILY  Authorizing Provider: NICHELLE PATEL  Ordering User: ANNA ALCANTARA    Routing refill request to provider for review/approval because:  Labs out of range:    TSH   Date Value Ref Range Status   08/04/2021 5.71 (H) 0.40 - 4.00 mU/L Final   06/24/2015 1.79 0.40 - 4.00 mU/L Final     Anna Alcantara, KATIEN, RN

## 2022-05-23 RX ORDER — LEVOTHYROXINE SODIUM 50 UG/1
TABLET ORAL
Qty: 30 TABLET | Refills: 0 | Status: SHIPPED | OUTPATIENT
Start: 2022-05-23 | End: 2022-07-19

## 2022-07-14 DIAGNOSIS — E03.8 OTHER SPECIFIED HYPOTHYROIDISM: ICD-10-CM

## 2022-07-14 DIAGNOSIS — K21.00 GASTROESOPHAGEAL REFLUX DISEASE WITH ESOPHAGITIS: ICD-10-CM

## 2022-07-18 RX ORDER — PANTOPRAZOLE SODIUM 40 MG/1
TABLET, DELAYED RELEASE ORAL
Qty: 30 TABLET | Refills: 5 | Status: SHIPPED | OUTPATIENT
Start: 2022-07-18 | End: 2022-11-04

## 2022-07-18 NOTE — TELEPHONE ENCOUNTER
Prescription approved per [facility] Refill Protocol.    Lorenzo Marroquin,BSN, RN

## 2022-07-18 NOTE — TELEPHONE ENCOUNTER
Routing refill request to provider for review/approval because:  Labs not current:  TSH      KATIE LayneN, RN

## 2022-07-19 RX ORDER — LEVOTHYROXINE SODIUM 50 UG/1
TABLET ORAL
Qty: 30 TABLET | Refills: 0 | Status: SHIPPED | OUTPATIENT
Start: 2022-07-19 | End: 2022-08-16

## 2022-08-11 DIAGNOSIS — E03.8 OTHER SPECIFIED HYPOTHYROIDISM: ICD-10-CM

## 2022-08-16 RX ORDER — LEVOTHYROXINE SODIUM 50 UG/1
TABLET ORAL
Qty: 30 TABLET | Refills: 0 | Status: SHIPPED | OUTPATIENT
Start: 2022-08-16 | End: 2022-09-09

## 2022-08-16 NOTE — TELEPHONE ENCOUNTER
Pending Prescriptions:                       Disp   Refills    levothyroxine (SYNTHROID/LEVOTHROID) 50 M*30 tab*0            Sig: TAKE 1 TABLET BY MOUTH DAILY    Medication is being filled for 1 time carlin refill only due to:  Patient is due for Annual Exam with Labs    Please call and help schedule.  Thank you!

## 2022-08-16 NOTE — TELEPHONE ENCOUNTER
Patient informed via mychart to schedule, 8/19 reminder if not read to send letter.   Yeni Zhang MA

## 2022-08-21 ENCOUNTER — HOSPITAL ENCOUNTER (EMERGENCY)
Facility: CLINIC | Age: 37
Discharge: HOME | End: 2022-08-21
Attending: EMERGENCY MEDICINE | Admitting: EMERGENCY MEDICINE
Payer: MEDICAID

## 2022-08-21 VITALS
DIASTOLIC BLOOD PRESSURE: 73 MMHG | WEIGHT: 187.6 LBS | RESPIRATION RATE: 19 BRPM | SYSTOLIC BLOOD PRESSURE: 109 MMHG | HEART RATE: 117 BPM | OXYGEN SATURATION: 95 % | BODY MASS INDEX: 33.23 KG/M2 | TEMPERATURE: 99.1 F

## 2022-08-21 DIAGNOSIS — L27.0 DRUG-INDUCED SKIN RASH: ICD-10-CM

## 2022-08-21 DIAGNOSIS — R00.0 SINUS TACHYCARDIA: ICD-10-CM

## 2022-08-21 DIAGNOSIS — T78.40XA ALLERGIC REACTION, INITIAL ENCOUNTER: ICD-10-CM

## 2022-08-21 LAB
ALBUMIN SERPL-MCNC: 3.4 G/DL (ref 3.4–5)
ALP SERPL-CCNC: 81 U/L (ref 40–150)
ALT SERPL W P-5'-P-CCNC: 40 U/L (ref 0–70)
ANION GAP SERPL CALCULATED.3IONS-SCNC: 8 MMOL/L (ref 3–14)
AST SERPL W P-5'-P-CCNC: 31 U/L (ref 0–45)
BASOPHILS # BLD AUTO: 0 10E3/UL (ref 0–0.2)
BASOPHILS NFR BLD AUTO: 0 %
BILIRUB SERPL-MCNC: 0.5 MG/DL (ref 0.2–1.3)
BUN SERPL-MCNC: 13 MG/DL (ref 7–30)
CALCIUM SERPL-MCNC: 8.8 MG/DL (ref 8.5–10.1)
CHLORIDE BLD-SCNC: 110 MMOL/L (ref 94–109)
CO2 SERPL-SCNC: 23 MMOL/L (ref 20–32)
CREAT SERPL-MCNC: 1.1 MG/DL (ref 0.66–1.25)
EOSINOPHIL # BLD AUTO: 0 10E3/UL (ref 0–0.7)
EOSINOPHIL NFR BLD AUTO: 1 %
ERYTHROCYTE [DISTWIDTH] IN BLOOD BY AUTOMATED COUNT: 13.9 % (ref 10–15)
GFR SERPL CREATININE-BSD FRML MDRD: 89 ML/MIN/1.73M2
GLUCOSE BLD-MCNC: 149 MG/DL (ref 70–99)
HCT VFR BLD AUTO: 40.8 % (ref 40–53)
HGB BLD-MCNC: 14 G/DL (ref 13.3–17.7)
IMM GRANULOCYTES # BLD: 0 10E3/UL
IMM GRANULOCYTES NFR BLD: 0 %
LYMPHOCYTES # BLD AUTO: 2.1 10E3/UL (ref 0.8–5.3)
LYMPHOCYTES NFR BLD AUTO: 25 %
MCH RBC QN AUTO: 28.7 PG (ref 26.5–33)
MCHC RBC AUTO-ENTMCNC: 34.3 G/DL (ref 31.5–36.5)
MCV RBC AUTO: 84 FL (ref 78–100)
MONOCYTES # BLD AUTO: 0.4 10E3/UL (ref 0–1.3)
MONOCYTES NFR BLD AUTO: 5 %
NEUTROPHILS # BLD AUTO: 5.8 10E3/UL (ref 1.6–8.3)
NEUTROPHILS NFR BLD AUTO: 69 %
NRBC # BLD AUTO: 0 10E3/UL
NRBC BLD AUTO-RTO: 0 /100
PLATELET # BLD AUTO: 190 10E3/UL (ref 150–450)
POTASSIUM BLD-SCNC: 4 MMOL/L (ref 3.4–5.3)
PROT SERPL-MCNC: 6.8 G/DL (ref 6.8–8.8)
RBC # BLD AUTO: 4.88 10E6/UL (ref 4.4–5.9)
SODIUM SERPL-SCNC: 141 MMOL/L (ref 133–144)
WBC # BLD AUTO: 8.3 10E3/UL (ref 4–11)

## 2022-08-21 PROCEDURE — 36415 COLL VENOUS BLD VENIPUNCTURE: CPT | Performed by: EMERGENCY MEDICINE

## 2022-08-21 PROCEDURE — 96375 TX/PRO/DX INJ NEW DRUG ADDON: CPT | Performed by: EMERGENCY MEDICINE

## 2022-08-21 PROCEDURE — 93005 ELECTROCARDIOGRAM TRACING: CPT | Performed by: EMERGENCY MEDICINE

## 2022-08-21 PROCEDURE — 96361 HYDRATE IV INFUSION ADD-ON: CPT | Performed by: EMERGENCY MEDICINE

## 2022-08-21 PROCEDURE — 82040 ASSAY OF SERUM ALBUMIN: CPT | Performed by: EMERGENCY MEDICINE

## 2022-08-21 PROCEDURE — 258N000003 HC RX IP 258 OP 636: Performed by: EMERGENCY MEDICINE

## 2022-08-21 PROCEDURE — 85025 COMPLETE CBC W/AUTO DIFF WBC: CPT | Performed by: EMERGENCY MEDICINE

## 2022-08-21 PROCEDURE — 250N000011 HC RX IP 250 OP 636: Performed by: EMERGENCY MEDICINE

## 2022-08-21 PROCEDURE — 99284 EMERGENCY DEPT VISIT MOD MDM: CPT | Performed by: EMERGENCY MEDICINE

## 2022-08-21 PROCEDURE — 99284 EMERGENCY DEPT VISIT MOD MDM: CPT | Mod: 25 | Performed by: EMERGENCY MEDICINE

## 2022-08-21 PROCEDURE — 96374 THER/PROPH/DIAG INJ IV PUSH: CPT | Performed by: EMERGENCY MEDICINE

## 2022-08-21 PROCEDURE — 80053 COMPREHEN METABOLIC PANEL: CPT | Performed by: EMERGENCY MEDICINE

## 2022-08-21 RX ORDER — METHYLPREDNISOLONE SODIUM SUCCINATE 125 MG/2ML
60 INJECTION, POWDER, LYOPHILIZED, FOR SOLUTION INTRAMUSCULAR; INTRAVENOUS ONCE
Status: COMPLETED | OUTPATIENT
Start: 2022-08-21 | End: 2022-08-21

## 2022-08-21 RX ORDER — DIPHENHYDRAMINE HYDROCHLORIDE 50 MG/ML
25 INJECTION INTRAMUSCULAR; INTRAVENOUS ONCE
Status: COMPLETED | OUTPATIENT
Start: 2022-08-21 | End: 2022-08-21

## 2022-08-21 RX ADMIN — DIPHENHYDRAMINE HYDROCHLORIDE 25 MG: 50 INJECTION, SOLUTION INTRAMUSCULAR; INTRAVENOUS at 09:48

## 2022-08-21 RX ADMIN — SODIUM CHLORIDE 1000 ML: 9 INJECTION, SOLUTION INTRAVENOUS at 09:44

## 2022-08-21 RX ADMIN — METHYLPREDNISOLONE SODIUM SUCCINATE 62.5 MG: 125 INJECTION, POWDER, FOR SOLUTION INTRAMUSCULAR; INTRAVENOUS at 09:45

## 2022-08-21 ASSESSMENT — ENCOUNTER SYMPTOMS
SHORTNESS OF BREATH: 0
DIZZINESS: 0
LIGHT-HEADEDNESS: 0
WEAKNESS: 0
HEADACHES: 0

## 2022-08-21 ASSESSMENT — ACTIVITIES OF DAILY LIVING (ADL)
ADLS_ACUITY_SCORE: 35
ADLS_ACUITY_SCORE: 35

## 2022-08-21 NOTE — ED TRIAGE NOTES
Pt comes in w/ a red/itchy rash all over his body that started on Friday. PT also has a tight throat, chills that started last night.

## 2022-08-21 NOTE — ED PROVIDER NOTES
History     Chief Complaint   Patient presents with     Rash     HPI  Brian Ortiz is a 36 year old male who presents with a diffuse body rash.  States is intensely pruritic.  Worsening over the last few days.  Was recently started on Zyprexa by his psychiatrist.  Has had no difficulty swallowing or breathing.  Increased anxiousness.  Significant past medical's for mild intellectual disabilities, ADHD, Goldenhar syndrome.    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     Priority: Medium     ADHD (attention deficit hyperactivity disorder), combined type 07/12/2020     Priority: Medium     Benign non-nodular prostatic hyperplasia with lower urinary tract symptoms 08/31/2016     Priority: Medium     Hearing decreased 04/22/2015     Priority: Medium     BPPV (benign paroxysmal positional vertigo) 04/22/2015     Priority: Medium     Anal fissure 05/06/2011     Priority: Medium     Acne 05/06/2011     Priority: Medium     CARDIOVASCULAR SCREENING; LDL GOAL LESS THAN 160 10/31/2010     Priority: Medium     Constipation 01/09/2008     Priority: Medium     Problem list name updated by automated process. Provider to review       Scoliosis associated with other condition 01/14/2005     Priority: Medium     Hearing loss 01/14/2005     Priority: Medium     Problem list name updated by automated process. Provider to review       Multiple congenital malformations, not elsewhere classified 01/14/2005     Priority: Medium     Diagnosis updated by automated process. Provider to review and confirm.          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  Bilateral sagittal split ramus osteotomy.  [facility]     ZZHC CREATE EARDRUM OPENING,GEN ANESTH      P.[employer]. Tubes       Family History:    Family History   Adopted: Yes   Problem Relation Age of Onset     Substance Abuse Mother      Substance Abuse Father        Social History:  Marital Status:  Single [1]  Social History     Tobacco Use     Smoking status: Never Smoker     Smokeless tobacco: Never Used   Vaping Use     Vaping Use: Never used   Substance Use Topics     Alcohol use: No     Alcohol/week: 0.0 standard drinks     Drug use: No        Medications:    atomoxetine (STRATTERA) 60 MG capsule  atorvastatin (LIPITOR) 20 MG tablet  buPROPion (WELLBUTRIN SR) 150 MG 12 hr tablet  doxycycline hyclate (VIBRA-TABS) 100 MG tablet  FANAPT 6 MG TABS tablet  hydrOXYzine (VISTARIL) 50 MG capsule  IBUPROFEN PO  levothyroxine (SYNTHROID/LEVOTHROID) 50 MCG tablet  pantoprazole (PROTONIX) 40 MG EC tablet  tamsulosin (FLOMAX) 0.4 MG capsule          Review of Systems   Respiratory: Negative for shortness of breath.    Neurological: Negative for dizziness, weakness, light-headedness and headaches.   All other systems reviewed and are negative.      Physical Exam   BP: 112/75  Pulse: (!) 123  Temp: 99.1  F (37.3  C)  Resp: 16  Weight: 85.1 kg (187 lb 9.6 oz)  SpO2: 96 %      Physical Exam  Vitals and nursing note reviewed.   Constitutional:       Appearance: He is not ill-appearing.   HENT:      Head: Normocephalic.      Nose: Nose normal. No rhinorrhea.      Mouth/Throat:      Mouth: Mucous membranes are dry.   Eyes:      Extraocular Movements: Extraocular movements intact.      Conjunctiva/sclera: Conjunctivae  normal.      Pupils: Pupils are equal, round, and reactive to light.   Cardiovascular:      Rate and Rhythm: Normal rate and regular rhythm.      Pulses: Normal pulses.      Heart sounds: No murmur heard.  Pulmonary:      Effort: Pulmonary effort is normal. No respiratory distress.      Breath sounds: No wheezing or rales.   Abdominal:      General: Abdomen is flat. Bowel sounds are normal. There is no distension.      Tenderness: There is no abdominal tenderness.   Musculoskeletal:      Cervical back: Normal range of motion and neck supple.   Skin:     General: Skin is warm.      Capillary Refill: Capillary refill takes less than 2 seconds.      Findings: Rash (Diffuse urticarial rash.  No desquamation.  No petechiae.  Rash blanches.) present.   Neurological:      General: No focal deficit present.      Mental Status: He is alert and oriented to person, place, and time.   Psychiatric:         Mood and Affect: Mood normal.         Behavior: Behavior normal.         ED Course                 Procedures                  Results for orders placed or performed during the hospital encounter of 08/21/22 (from the past 24 hour(s))   CBC with platelets differential    Narrative    The following orders were created for panel order CBC with platelets differential.  Procedure                               Abnormality         Status                     ---------                               -----------         ------                     CBC with platelets and d...[[id number]]                      Final result                 Please view results for these tests on the individual orders.   Comprehensive metabolic panel   Result Value Ref Range    Sodium 141 133 - 144 mmol/L    Potassium 4.0 3.4 - 5.3 mmol/L    Chloride 110 (H) 94 - 109 mmol/L    Carbon Dioxide (CO2) 23 20 - 32 mmol/L    Anion Gap 8 3 - 14 mmol/L    Urea Nitrogen 13 7 - 30 mg/dL    Creatinine 1.10 0.66 - 1.25 mg/dL    Calcium 8.8 8.5 - 10.1 mg/dL    Glucose 149  (H) 70 - 99 mg/dL    Alkaline Phosphatase 81 40 - 150 U/L    AST 31 0 - 45 U/L    ALT 40 0 - 70 U/L    Protein Total 6.8 6.8 - 8.8 g/dL    Albumin 3.4 3.4 - 5.0 g/dL    Bilirubin Total 0.5 0.2 - 1.3 mg/dL    GFR Estimate 89 >60 mL/min/1.73m2   CBC with platelets and differential   Result Value Ref Range    WBC Count 8.3 4.0 - 11.0 10e3/uL    RBC Count 4.88 4.40 - 5.90 10e6/uL    Hemoglobin 14.0 13.3 - 17.7 g/dL    Hematocrit 40.8 40.0 - 53.0 %    MCV 84 78 - 100 fL    MCH 28.7 26.5 - 33.0 pg    MCHC 34.3 31.5 - 36.5 g/dL    RDW 13.9 10.0 - 15.0 %    Platelet Count 190 150 - 450 10e3/uL    % Neutrophils 69 %    % Lymphocytes 25 %    % Monocytes 5 %    % Eosinophils 1 %    % Basophils 0 %    % Immature Granulocytes 0 %    NRBCs per 100 WBC 0 <1 /100    Absolute Neutrophils 5.8 1.6 - 8.3 10e3/uL    Absolute Lymphocytes 2.1 0.8 - 5.3 10e3/uL    Absolute Monocytes 0.4 0.0 - 1.3 10e3/uL    Absolute Eosinophils 0.0 0.0 - 0.7 10e3/uL    Absolute Basophils 0.0 0.0 - 0.2 10e3/uL    Absolute Immature Granulocytes 0.0 <=0.4 10e3/uL    Absolute NRBCs 0.0 10e3/uL       Medications   0.9% sodium chloride BOLUS (1,000 mLs Intravenous New Bag 8/21/22 0944)   diphenhydrAMINE (BENADRYL) injection 25 mg (25 mg Intravenous Given 8/21/22 0948)   methylPREDNISolone sodium succinate (solu-MEDROL) injection 62.5 mg (62.5 mg Intravenous Given 8/21/22 0945)       Assessments & Plan (with Medical Decision Making)  Urticarial rash.  It is diffuse.  Areas of excoriation.  Appears consistent with a drug eruption after recent start of Zyprexa.  No airway compromise.  Lungs were clear to auscultation.  /73.  He was tachycardic with heart rate of 117 bpm.  I suspect combination of anxiety and dehydration.  Received 1 L normal saline and Benadryl 25 mg IV and Solu-Medrol 60 mg IV.  EKG confirmed underlying sinus rhythm with no acute repolarization changes  Discharged home with Benadryl 25 mg every 6 hours if too sedating may take Zyrtec 10 mg  twice daily and Benadryl 50 mg at bedtime.  He is to stop the Zyprexa.  He plans to contact his psychiatrist to discuss alternate medication options once rash is resolved.  He will to return if he starts having any chest discomfort shortness of breath wheezing, airway concerns.     I have reviewed the nursing notes.    I have reviewed the findings, diagnosis, plan and need for follow up with the patient.      New Prescriptions    No medications on file       Final diagnoses:   Drug-induced skin rash   Allergic reaction, initial encounter   Sinus tachycardia - Allergic reaction stress response       8/21/2022   [facility] EMERGENCY DEPT     Lit Canseco,   08/21/22 1520

## 2022-08-21 NOTE — DISCHARGE INSTRUCTIONS
- Stop Zyprexa    -Schedule follow-up with your psychiatrist    -Benadryl 25-50 mg every 8 hours for the next 24-48 hours  If Benadryl is too sedating you can switch to Zyrtec 10 mg twice daily and add Benadryl 50 mg at bedtime    -

## 2022-08-23 ENCOUNTER — HOSPITAL ENCOUNTER (EMERGENCY)
Facility: CLINIC | Age: 37
Discharge: HOME | End: 2022-08-23
Attending: EMERGENCY MEDICINE | Admitting: EMERGENCY MEDICINE
Payer: MEDICAID

## 2022-08-23 VITALS
RESPIRATION RATE: 16 BRPM | SYSTOLIC BLOOD PRESSURE: 132 MMHG | DIASTOLIC BLOOD PRESSURE: 78 MMHG | BODY MASS INDEX: 33.83 KG/M2 | HEART RATE: 94 BPM | OXYGEN SATURATION: 96 % | WEIGHT: 191 LBS | TEMPERATURE: 98.4 F

## 2022-08-23 DIAGNOSIS — L50.9 HIVES: ICD-10-CM

## 2022-08-23 DIAGNOSIS — T78.40XA ALLERGIC REACTION, INITIAL ENCOUNTER: ICD-10-CM

## 2022-08-23 PROCEDURE — 96374 THER/PROPH/DIAG INJ IV PUSH: CPT

## 2022-08-23 PROCEDURE — 250N000011 HC RX IP 250 OP 636: Performed by: EMERGENCY MEDICINE

## 2022-08-23 PROCEDURE — 250N000013 HC RX MED GY IP 250 OP 250 PS 637: Performed by: EMERGENCY MEDICINE

## 2022-08-23 PROCEDURE — 99285 EMERGENCY DEPT VISIT HI MDM: CPT | Mod: 25

## 2022-08-23 PROCEDURE — 258N000003 HC RX IP 258 OP 636: Performed by: EMERGENCY MEDICINE

## 2022-08-23 PROCEDURE — 96375 TX/PRO/DX INJ NEW DRUG ADDON: CPT

## 2022-08-23 PROCEDURE — 99284 EMERGENCY DEPT VISIT MOD MDM: CPT | Performed by: EMERGENCY MEDICINE

## 2022-08-23 PROCEDURE — 96361 HYDRATE IV INFUSION ADD-ON: CPT

## 2022-08-23 RX ORDER — CETIRIZINE HYDROCHLORIDE 10 MG/1
10 TABLET ORAL ONCE
Status: COMPLETED | OUTPATIENT
Start: 2022-08-23 | End: 2022-08-23

## 2022-08-23 RX ORDER — CETIRIZINE HYDROCHLORIDE 10 MG/1
10 TABLET ORAL 2 TIMES DAILY PRN
Qty: 20 TABLET | Refills: 0 | Status: SHIPPED | OUTPATIENT
Start: 2022-08-23 | End: 2022-09-02

## 2022-08-23 RX ORDER — DEXAMETHASONE SODIUM PHOSPHATE 10 MG/ML
10 INJECTION, SOLUTION INTRAMUSCULAR; INTRAVENOUS ONCE
Status: COMPLETED | OUTPATIENT
Start: 2022-08-23 | End: 2022-08-23

## 2022-08-23 RX ORDER — PREDNISONE 10 MG/1
TABLET ORAL
Qty: 20 TABLET | Refills: 0 | Status: SHIPPED | OUTPATIENT
Start: 2022-08-23 | End: 2022-11-04

## 2022-08-23 RX ORDER — ILOPERIDONE 12 MG/1
12 TABLET ORAL DAILY
COMMUNITY
Start: 2022-07-14 | End: 2024-01-04

## 2022-08-23 RX ORDER — SERTRALINE HYDROCHLORIDE 100 MG/1
150 TABLET, FILM COATED ORAL DAILY
COMMUNITY
Start: 2022-08-11

## 2022-08-23 RX ORDER — SODIUM CHLORIDE 9 MG/ML
INJECTION, SOLUTION INTRAVENOUS CONTINUOUS
Status: DISCONTINUED | OUTPATIENT
Start: 2022-08-23 | End: 2022-08-23

## 2022-08-23 RX ADMIN — SODIUM CHLORIDE 1000 ML: 9 INJECTION, SOLUTION INTRAVENOUS at 12:56

## 2022-08-23 RX ADMIN — CETIRIZINE HYDROCHLORIDE 10 MG: 10 TABLET, FILM COATED ORAL at 12:58

## 2022-08-23 RX ADMIN — FAMOTIDINE 20 MG: 10 INJECTION INTRAVENOUS at 14:02

## 2022-08-23 RX ADMIN — CETIRIZINE HYDROCHLORIDE 10 MG: 10 TABLET, FILM COATED ORAL at 14:00

## 2022-08-23 RX ADMIN — SODIUM CHLORIDE 1000 ML: 9 INJECTION, SOLUTION INTRAVENOUS at 13:49

## 2022-08-23 RX ADMIN — DEXAMETHASONE SODIUM PHOSPHATE 10 MG: 10 INJECTION, SOLUTION INTRAMUSCULAR; INTRAVENOUS at 12:58

## 2022-08-23 ASSESSMENT — ACTIVITIES OF DAILY LIVING (ADL)
ADLS_ACUITY_SCORE: 35

## 2022-08-23 NOTE — ED TRIAGE NOTES
Brought in VIA EMS  With scattered rash. He was seen on 8/21 for rash which was thought to be from Zyprexa which he had recently been stated on. The rash improved but returned over night. Given benadryl per EMS in route to hospital.     Triage Assessment     Row Name 08/23/22 1151       Triage Assessment (Adult)    Airway WDL WDL       Respiratory WDL    Respiratory WDL WDL       Skin Circulation/Temperature WDL    Skin Circulation/Temperature WDL X       Cardiac WDL    Cardiac WDL X       Peripheral/Neurovascular WDL    Peripheral Neurovascular WDL WDL       Cognitive/Neuro/Behavioral WDL    Cognitive/Neuro/Behavioral WDL WDL       [place] Coma Scale    Best Eye Response 4-->(E4) spontaneous    Best Motor Response 6-->(M6) obeys commands    Best Verbal Response 5-->(V5) oriented    [place] Coma Scale Score 15

## 2022-08-23 NOTE — DISCHARGE INSTRUCTIONS
Take Zyrtec 10 mg twice daily as needed for itching and hives.    Okay to add Benadryl 25 mg every 6 hours as needed for itch.    Prednisone taper as prescribed.  This is to decrease redness, itching and to reverse the allergic process.  Take with food or milk.    Follow-up in clinic if not improving and return at anytime for worsening, changes or concerns.    I hope that you start to improve very quickly!!!

## 2022-08-23 NOTE — ED NOTES
Patient's Mom, Essence called for status update. She was provided update, plan of care and discharge plan. She verbalizes understanding. Gosia Dickerson RN

## 2022-08-23 NOTE — ED NOTES
Bed: ED01  Expected date:   Expected time:   Means of arrival:   Comments:  EMS- [id number]  34 YO Male Allergic Rxn   TAKE MED AS ADVISED    DIET/ EXERCISE.     FOLLOW UP WITHIN 3 MONTHS / AS NEEDED     FOLLOW UP FOR FASTING LABS

## 2022-08-24 NOTE — ED PROVIDER NOTES
History     Chief Complaint   Patient presents with     Rash     HPI  History per patient and medical records    This is a 36-year-old male, history of ADHD, anxiety, other history as below, presenting with rash.  Patient was seen in this ED on 8/21/2022 with diffuse pruritic body rash.  It was thought secondary to recent start of Zyprexa by patient's psychiatrist.  In the ED he received Benadryl, Solu-Medrol, and he was discharged home with instructions to take Benadryl/Zyrtec as needed and stop the Zyprexa.  Patient notes that he denies episodes of rapid but overnight the rash recurred.  He has significant itching over most all of his body along with hives.  He denies any new or change in foods or medications except for the Zyprexa as noted.  He does acknowledge drinking a lot of milk so that he can stay well-hydrated but has not had issues with milk in the past.  He states that he has had intermittent nausea and vomiting for the last year, nothing new.  He denies any lip or tongue swelling, throat tightness, shortness of breath.  Patient was brought in by medics and received Benadryl in route.  No recent illnesses, fevers, chills, headache, sore throat, cold or cough symptoms.    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     Priority: Medium     ADHD (attention deficit hyperactivity disorder), combined type 07/12/2020     Priority: Medium     Benign non-nodular prostatic hyperplasia with lower urinary tract symptoms 08/31/2016     Priority: Medium     Hearing decreased 04/22/2015     Priority: Medium     BPPV (benign paroxysmal positional vertigo) 04/22/2015     Priority: Medium     Anal fissure 05/06/2011     Priority: Medium     Acne 05/06/2011     Priority: Medium      CARDIOVASCULAR SCREENING; LDL GOAL LESS THAN 160 10/31/2010     Priority: Medium     Constipation 01/09/2008     Priority: Medium     Problem list name updated by automated process. Provider to review       Scoliosis associated with other condition 01/14/2005     Priority: Medium     Hearing loss 01/14/2005     Priority: Medium     Problem list name updated by automated process. Provider to review       Multiple congenital malformations, not elsewhere classified 01/14/2005     Priority: Medium     Diagnosis updated by automated process. Provider to review and confirm.          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  Bilateral sagittal split ramus osteotomy.  [facility]     ZZ CREATE EARDRUM OPENING,GEN ANESTH      P.E. Tubes       Family History:    Family History   Adopted: Yes   Problem Relation Age of Onset     Substance Abuse Mother      Substance Abuse Father        Social History:  Marital Status:  Single [1]  Social History     Tobacco Use     Smoking status: Never Smoker     Smokeless tobacco: Never Used   Vaping Use     Vaping Use: Never used   Substance Use Topics     Alcohol use: No     Alcohol/week: 0.0 standard drinks     Drug use: No        Medications:    atorvastatin (LIPITOR) 20 MG tablet  cetirizine (ZYRTEC) 10 MG tablet  FANAPT 12 MG TABS  levothyroxine (SYNTHROID/LEVOTHROID) 50 MCG tablet  pantoprazole (PROTONIX) 40 MG EC tablet  predniSONE (DELTASONE) 10 MG tablet  sertraline (ZOLOFT) 100 MG  tablet          Review of Systems   All other ROS reviewed and are negative or non-contributory except as stated in HPI.     Physical Exam   BP: 122/77  Pulse: 114  Temp: 98.4  F (36.9  C)  Resp: 16  Weight: 86.6 kg (191 lb)  SpO2: 97 %      Physical Exam  Vitals and nursing note reviewed.   Constitutional:       Comments: Healthy-appearing male sitting on the bed.  Obvious hives scattered over face, torso, hands, extremities   HENT:      Head: Normocephalic.      Mouth/Throat:      Mouth: Mucous membranes are moist.      Pharynx: Oropharynx is clear.      Comments: No lip or tongue swelling  Eyes:      Extraocular Movements: Extraocular movements intact.      Comments: Mild conjunctival injection   Cardiovascular:      Rate and Rhythm: Regular rhythm. Tachycardia present.      Pulses: Normal pulses.      Heart sounds: Normal heart sounds.   Pulmonary:      Effort: Pulmonary effort is normal.      Breath sounds: Normal breath sounds.   Abdominal:      Palpations: Abdomen is soft.      Tenderness: There is no abdominal tenderness.   Musculoskeletal:         General: Normal range of motion.      Cervical back: Normal range of motion and neck supple.      Right lower leg: No edema.      Left lower leg: No edema.   Skin:     General: Skin is warm and dry.      Comments: Urticarial hives most notably on bilateral hands, some on the face, neck, torso and legs   Neurological:      General: No focal deficit present.      Mental Status: He is alert.   Psychiatric:         Mood and Affect: Mood normal.         Behavior: Behavior normal.         ED Course (with Medical Decision Making)    Pt seen and examined by me.  RN and EPIC notes reviewed.      Patient with rash consistent with urticaria.  He was just seen for same rash a couple of days ago thought likely secondary to Zyprexa.  He has not taken Zyprexa for about 4 days or so.    I assume this is just some rebound although there is any number of underlying causes possible.   Besides the itching, patient is well-appearing and his vital signs are unremarkable except for mild tachycardia.  There is no impending respiratory or cardiac issues noted at this point.  I doubt that he needs epinephrine.    I am going to give him Zyrtec, dexamethasone and some fluids.  Monitor.    Patient had mild relief continue to be itchy.  He was given Pepcid and second dose of Zyrtec.  I think that the steroids will start to kick in and he will have much improvement.  I am going to give him a short course of steroids on discharge to be sure he does not have a second rebound effect.  He can also use Zyrtec twice daily as needed.    Continue to monitor symptoms.  Follow-up in clinic and with his psychiatrist.  Return at anytime for worsening, changes or concerns.     Procedures      No results found for this or any previous visit (from the past 24 hour(s)).    Medications   cetirizine (zyrTEC) tablet 10 mg (10 mg Oral Given 8/23/22 1258)   dexamethasone PF (DECADRON) injection 10 mg (10 mg Intravenous Given 8/23/22 1258)   0.9% sodium chloride BOLUS (0 mLs Intravenous Stopped 8/23/22 1349)   0.9% sodium chloride BOLUS (0 mLs Intravenous Stopped 8/23/22 1445)   famotidine (PEPCID) injection 20 mg (20 mg Intravenous Given 8/23/22 1402)   cetirizine (zyrTEC) tablet 10 mg (10 mg Oral Given 8/23/22 1400)       Assessments & Plan     I have reviewed the findings, diagnosis, plan and need for follow up with the patient.      Discharge Medication List as of 8/23/2022  4:22 PM      START taking these medications    Details   cetirizine (ZYRTEC) 10 MG tablet Take 1 tablet (10 mg) by mouth 2 times daily as needed for allergies (1 tab up to twice a day as needed for itch, rash, hives, allergy), Disp-20 tablet, R-0, E-Prescribe      predniSONE (DELTASONE) 10 MG tablet Take 4 tabs by mouth daily x 1 days, then 3 tabs daily x 1 days, then 2 tab daily x 1 days, then 1 tab daily x 1 day, then take 1/2 tab daily x 1 day,  Disp-20 tablet, R-0, E-Prescribe             Final diagnoses:   Hives   Allergic reaction, initial encounter     Disposition: Patient discharged home in stable condition.  Plan as above.  Return for concerns.     Note: Chart documentation done in part with Dragon Voice Recognition software. Although reviewed after completion, some word and grammatical errors may remain.     8/23/2022   [facility] EMERGENCY DEPT     Danielle Velazquez MD  08/24/22 4887

## 2022-08-31 ENCOUNTER — NURSE TRIAGE (OUTPATIENT)
Dept: NURSING | Facility: CLINIC | Age: 37
End: 2022-08-31

## 2022-09-08 DIAGNOSIS — E03.8 OTHER SPECIFIED HYPOTHYROIDISM: ICD-10-CM

## 2022-09-09 RX ORDER — LEVOTHYROXINE SODIUM 50 UG/1
TABLET ORAL
Qty: 30 TABLET | Refills: 0 | Status: SHIPPED | OUTPATIENT
Start: 2022-09-09 | End: 2022-09-15

## 2022-09-09 NOTE — TELEPHONE ENCOUNTER
Pending Prescriptions:                       Disp   Refills    levothyroxine (SYNTHROID/LEVOTHROID) 50 MC*30 tab*0        Sig: TAKE 1 TABLET BY MOUTH DAILY      Routing refill request to provider for review/approval because:  Rylee given x1 and patient did not follow up, please advise  Labs not current:    TSH   Date Value Ref Range Status   08/04/2021 5.71 (H) 0.40 - 4.00 mU/L Final   06/24/2015 1.79 0.40 - 4.00 mU/L Final       Patient needs to be seen because it has been more than 1 year since last office visit.    Aylin Wilcox RN

## 2022-09-15 ENCOUNTER — OFFICE VISIT (OUTPATIENT)
Dept: FAMILY MEDICINE | Facility: CLINIC | Age: 37
End: 2022-09-15
Payer: MEDICAID

## 2022-09-15 ENCOUNTER — TELEPHONE (OUTPATIENT)
Dept: FAMILY MEDICINE | Facility: CLINIC | Age: 37
End: 2022-09-15

## 2022-09-15 VITALS
OXYGEN SATURATION: 98 % | HEIGHT: 63 IN | HEART RATE: 110 BPM | TEMPERATURE: 97.6 F | DIASTOLIC BLOOD PRESSURE: 70 MMHG | BODY MASS INDEX: 33.71 KG/M2 | RESPIRATION RATE: 20 BRPM | SYSTOLIC BLOOD PRESSURE: 100 MMHG | WEIGHT: 190.25 LBS

## 2022-09-15 DIAGNOSIS — Q89.7 MULTIPLE CONGENITAL MALFORMATIONS, NOT ELSEWHERE CLASSIFIED: ICD-10-CM

## 2022-09-15 DIAGNOSIS — E03.9 HYPOTHYROIDISM, UNSPECIFIED TYPE: ICD-10-CM

## 2022-09-15 DIAGNOSIS — E03.8 OTHER SPECIFIED HYPOTHYROIDISM: ICD-10-CM

## 2022-09-15 DIAGNOSIS — E78.5 HYPERLIPIDEMIA LDL GOAL <130: ICD-10-CM

## 2022-09-15 DIAGNOSIS — I10 HYPERTENSION, UNSPECIFIED TYPE: ICD-10-CM

## 2022-09-15 DIAGNOSIS — Z00.01 ENCOUNTER FOR GENERAL ADULT MEDICAL EXAMINATION WITH ABNORMAL FINDINGS: Primary | ICD-10-CM

## 2022-09-15 LAB
CHOLEST SERPL-MCNC: 168 MG/DL
FASTING STATUS PATIENT QL REPORTED: YES
HDLC SERPL-MCNC: 69 MG/DL
LDLC SERPL CALC-MCNC: 60 MG/DL
NONHDLC SERPL-MCNC: 99 MG/DL
TRIGL SERPL-MCNC: 193 MG/DL
TSH SERPL DL<=0.005 MIU/L-ACNC: 2.63 MU/L (ref 0.4–4)

## 2022-09-15 PROCEDURE — 36415 COLL VENOUS BLD VENIPUNCTURE: CPT | Performed by: FAMILY MEDICINE

## 2022-09-15 PROCEDURE — 99395 PREV VISIT EST AGE 18-39: CPT | Mod: 25 | Performed by: FAMILY MEDICINE

## 2022-09-15 PROCEDURE — 80061 LIPID PANEL: CPT | Performed by: FAMILY MEDICINE

## 2022-09-15 PROCEDURE — 90471 IMMUNIZATION ADMIN: CPT | Performed by: FAMILY MEDICINE

## 2022-09-15 PROCEDURE — 90472 IMMUNIZATION ADMIN EACH ADD: CPT | Performed by: FAMILY MEDICINE

## 2022-09-15 PROCEDURE — 99213 OFFICE O/P EST LOW 20 MIN: CPT | Mod: 25 | Performed by: FAMILY MEDICINE

## 2022-09-15 PROCEDURE — 90715 TDAP VACCINE 7 YRS/> IM: CPT | Performed by: FAMILY MEDICINE

## 2022-09-15 PROCEDURE — 84443 ASSAY THYROID STIM HORMONE: CPT | Performed by: FAMILY MEDICINE

## 2022-09-15 PROCEDURE — 90686 IIV4 VACC NO PRSV 0.5 ML IM: CPT | Performed by: FAMILY MEDICINE

## 2022-09-15 RX ORDER — ATORVASTATIN CALCIUM 20 MG/1
20 TABLET, FILM COATED ORAL AT BEDTIME
Qty: 90 TABLET | Refills: 1 | Status: SHIPPED | OUTPATIENT
Start: 2022-09-15 | End: 2022-11-04

## 2022-09-15 RX ORDER — LEVOTHYROXINE SODIUM 50 UG/1
50 TABLET ORAL DAILY
Qty: 90 TABLET | Refills: 1 | Status: SHIPPED | OUTPATIENT
Start: 2022-09-15 | End: 2022-11-04

## 2022-09-15 ASSESSMENT — ENCOUNTER SYMPTOMS
FEVER: 0
ARTHRALGIAS: 0
CONSTIPATION: 0
DIZZINESS: 0
CHILLS: 0
ABDOMINAL PAIN: 0
FREQUENCY: 0
HEMATOCHEZIA: 0
HEARTBURN: 0
DIARRHEA: 0
PALPITATIONS: 1
WEAKNESS: 0
EYE PAIN: 0
COUGH: 0
PARESTHESIAS: 0
SORE THROAT: 1
JOINT SWELLING: 0
NAUSEA: 1
MYALGIAS: 0
HEMATURIA: 0
SHORTNESS OF BREATH: 1
NERVOUS/ANXIOUS: 1
HEADACHES: 0
DYSURIA: 1

## 2022-09-15 ASSESSMENT — PATIENT HEALTH QUESTIONNAIRE - PHQ9
SUM OF ALL RESPONSES TO PHQ QUESTIONS 1-9: 16
10. IF YOU CHECKED OFF ANY PROBLEMS, HOW DIFFICULT HAVE THESE PROBLEMS MADE IT FOR YOU TO DO YOUR WORK, TAKE CARE OF THINGS AT HOME, OR GET ALONG WITH OTHER PEOPLE: SOMEWHAT DIFFICULT
SUM OF ALL RESPONSES TO PHQ QUESTIONS 1-9: 16

## 2022-09-15 ASSESSMENT — PAIN SCALES - GENERAL: PAINLEVEL: NO PAIN (0)
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
## 2022-09-15 NOTE — LETTER
[address] [id number] [phone]        September 20, 2022    Brian Ortiz  [address]   [facility] [address]          Dear Brian,    We have attempted to reach you by phone in regards to your care. Dr. Canseco advised that you will need to set up a face to face visit to discuss your on going concerns, have x rays completed. Please call the clinic at [phone] option 1 to schedule.     Sincerely,        Duy Canseco MD  Care Team

## 2022-09-15 NOTE — TELEPHONE ENCOUNTER
Duy Canseco MD  [facility]  Caller: Unspecified (4 days ago,  5:00 PM)  He will need to set up a face-to-face visit to discuss this he did not say a thing about it at this week visit.  Could use a virtual visit next week for face-to-face but make sure its not at the end of the day as we may need to do some x-ray.                 FYI - Status Update    Who is Calling: patient and Jannet patients      Update: Jannet stating they have noted that patient has had increase in episodes of struggling with breathing when doing chores or activizes, occasionally she has noted his lips will have a bluish color. She is questioning as well as the patient if he needs further evaluation for this. Informed Jannet that there was no noted concerns of this in his visit with his doctor today.       Does caller want a call/response back: Yes     Could we send this information to you in mGenerator or would you prefer to receive a phone call?:   Patient would prefer a phone call   Okay to leave a detailed message?: Yes at Home number on file [phone] (home)

## 2022-09-15 NOTE — NURSING NOTE
Prior to immunization administration, verified patients identity using patient s name and date of birth. Please see Immunization Activity for additional information.     Screening Questionnaire for Adult Immunization    Are you sick today?   No   Do you have allergies to medications, food, a vaccine component or latex?   Yes   Have you ever had a serious reaction after receiving a vaccination?   No   Do you have a long-term health problem with heart, lung, kidney, or metabolic disease (e.g., diabetes), asthma, a blood disorder, no spleen, complement component deficiency, a cochlear implant, or a spinal fluid leak?  Are you on long-term aspirin therapy?   Yes   Do you have cancer, leukemia, HIV/AIDS, or any other immune system problem?   No   Do you have a parent, brother, or sister with an immune system problem?   No   In the past 3 months, have you taken medications that affect  your immune system, such as prednisone, other steroids, or anticancer drugs; drugs for the treatment of rheumatoid arthritis, Crohn s disease, or psoriasis; or have you had radiation treatments?   No   Have you had a seizure, or a brain or other nervous system problem?   No   During the past year, have you received a transfusion of blood or blood    products, or been given immune (gamma) globulin or antiviral drug?   No   For women: Are you pregnant or is there a chance you could become       pregnant during the next month?   No   Have you received any vaccinations in the past 4 weeks?   No     Immunization questionnaire was positive for at least one answer.  Notified Dr. Canseco.        Per orders of  , injection of  given by Kimber Lovell LPN. Patient instructed to remain in clinic for 15 minutes afterwards, and to report any adverse reaction to me immediately.       Screening performed by Kimber Lovell LPN on 9/15/2022 at 8:10 AM.  Prior to injection verified patient identity using patient's name and date of birth.  Patient  instructed to wait 15-20 minutes after injection and to report any adverse reactions.

## 2022-09-19 NOTE — TELEPHONE ENCOUNTER
Message left - need to schedule appointment.    Conchita Medina XRO/      Duy Canseco MD  [facility]  Caller: Unspecified (4 days ago,  5:00 PM)  He will need to set up a face-to-face visit to discuss this he did not say a thing about it at this week visit.  Could use a virtual visit next week for face-to-face but make sure its not at the end of the day as we may need to do some x-ray.

## 2022-09-20 NOTE — TELEPHONE ENCOUNTER
Unable to reach patient, after multiple attempts. Letter sent to patient to schedule an appointment. [employer] message sent as well.     Closing encounter.     Yeni Zhang MA

## 2022-09-22 ENCOUNTER — TELEPHONE (OUTPATIENT)
Dept: FAMILY MEDICINE | Facility: CLINIC | Age: 37
End: 2022-09-22

## 2022-09-22 ENCOUNTER — NURSE TRIAGE (OUTPATIENT)
Dept: FAMILY MEDICINE | Facility: CLINIC | Age: 37
End: 2022-09-22

## 2022-09-22 NOTE — TELEPHONE ENCOUNTER
Reason for call:  Other   Patient called regarding (reason for call): appointment and call back  Additional comments: Per patient: is there anyway I can be placed on a cancellation list for an appt this week? I addressed some concerns with Dr Canseco, & those concerns was ignored.    Phone number to reach patient:  Cell number on file:    Telephone Information:   Mobile [phone]       Best Time:  Anytime    Can we leave a detailed message on this number?  YES    Travel screening: Not Applicable

## 2022-09-22 NOTE — TELEPHONE ENCOUNTER
[id number]:   Patient calling stating that last week he was shopping and he started having a difficult time breathing and his lips turned blue. Patient did not past out, no chest pain, or other symptoms noted by patient. Today the same thing happened. No other symptoms. During conversation patient did not have symptoms.     Patient schedule a visit.     Next 5 appointments (look out 90 days)    Oct 14, 2022 11:00 AM  (Arrive by 10:40 AM)  Provider Visit with Duy Canseco MD  [facility] ([facility] ) [address] [place] [id number] [phone]          BACKGROUND:   Hypertension  Hypothyroidism    HOME TREATMENTS:  REst    PLAN:   Advised going to the ED if something like that occurs again.      RECOMMENDED DISPOSITION:  Monitor, if occurs again go to the ER.   Will comply with recommendation: yes   If further questions/concerns or if Sx do not improve, worsen or new Sx develop, call your PCP or [facility] as soon as possible.    NOTES:  Disposition was determined by the first positive assessment question, therefore all previous assessment questions were negative.         KATIE ArriagaN, RN, PHN  [place]  September 22, 2022    Additional Information    Negative: SEVERE difficulty breathing (e.g., struggling for each breath, speaks in single words, pulse > 120)    Negative: Breathing stopped and hasn't returned    Negative: Choking on something    Negative: Bluish (or gray) lips or face    Negative: Difficult to awaken or acting confused (e.g., disoriented, slurred speech)    Negative: Passed out (i.e., fainted, collapsed and was not responding)    Negative: Wheezing started suddenly after medicine, an allergic food, or bee sting    Negative: Stridor    Negative: Slow, shallow and weak breathing    Negative: Sounds like a life-threatening emergency to the triager    Negative: Chest pain    Negative:  "Wheezing (high pitched whistling sound) and previous asthma attacks or use of asthma medicines    Negative: Difficulty breathing and within 14 days of COVID-19 Exposure    Negative: Difficulty breathing and only present when coughing    Negative: Difficulty breathing and only from stuffy nose    Negative: Difficulty breathing and only from stuffy nose or runny nose from common cold    Negative: MODERATE difficulty breathing (e.g., speaks in phrases, SOB even at rest, pulse 100-120) of new-onset or worse than normal    Negative: Oxygen level (e.g., pulse oximetry) 90 percent or lower    Negative: Wheezing can be heard across the room    Negative: Drooling or spitting out saliva (because can't swallow)    Negative: Any history of prior \"blood clot\" in leg or lungs    Negative: Illness requiring prolonged bedrest in past month (e.g., immobilization, long hospital stay)    Negative: Hip or leg fracture (broken bone) in past month (or had cast on leg or ankle in past month)    Negative: Major surgery in the past month    Negative: Long-distance travel in past month (e.g., car, bus, train, plane; with trip lasting 6 or more hours)    Negative: Cancer treatment in past six months (or has cancer now)    Negative: Extra heart beats OR irregular heart beating (i.e., \"palpitations\")    Negative: Fever > 103 F (39.4 C)    Negative: Fever > 101 F (38.3 C) and over 60 years of age    Negative: Fever > 100.0 F (37.8 C) and bedridden (e.g., nursing home patient, stroke, chronic illness, recovering from surgery)    Negative: Fever > 100.0 F (37.8 C) and diabetes mellitus or weak immune system (e.g., HIV positive, cancer chemo, splenectomy, organ transplant, chronic steroids)    Negative: Periods where breathing stops and then resumes normally and bedridden (e.g., nursing home patient, CVA)    Negative: Pregnant or postpartum (from 0 to 6 weeks after delivery)    Negative: Patient sounds very sick or weak to the triager    Negative: " MILD difficulty breathing (e.g., minimal/no SOB at rest, SOB with walking, pulse < 100) of new-onset or worse than normal    Negative: Longstanding difficulty breathing (e.g., CHF, COPD, emphysema) and worse than normal    Negative: Longstanding difficulty breathing and not responding to usual therapy    Negative: Continuous (nonstop) coughing    Negative: Patient wants to be seen    Negative: Oxygen level (e.g., pulse oximetry) 91 to 94 percent    Negative: MODERATE longstanding difficulty breathing (e.g., speaks in phrases, SOB even at rest, pulse 100-120) and SAME as normal    Negative: MILD longstanding difficulty breathing (e.g., speaks in phrases, SOB even at rest, pulse 100-120) and SAME as normal    Protocols used: BREATHING DIFFICULTY-A-OH

## 2022-09-23 NOTE — TELEPHONE ENCOUNTER
OK to wait for Dr. Joseph's return.    Patient is calling to report that he would like to switch providers.  He states he was just seen by Dr. Canseco on 9/15/22.  Phone encounter the same day states these symptoms, but it looks like patient was called multiple times with no answer to be seen next week with Dr. Canseco.      He is informed of this information, and informed he can be seen on Wednesday for this in the morning.  Patient reports he is looking to switch providers to Dr. Joseph.      He is informed the RN does not know how soon he can be seen with Dr. Joseph.  He said that was fine and to keep his appointment on 10/14/22 with Dr. Canseco in case he cannot get in to see Dr. Joseph before the 14th.    See triage from 9/22/22 with symptoms to decide if a 20 minute visit will be appropriate.  Lucia Bateman, KATIEN, RN

## 2022-09-26 NOTE — TELEPHONE ENCOUNTER
Can be scheduled with next in-person office visit that is available.  april that is before appointment scheduled with Dr. Canseco.    Will have staff notify patient and he can schedule as he desires.    Olu Joseph MD

## 2022-09-26 NOTE — TELEPHONE ENCOUNTER
I have contacted the pt and scheduled an appt for 11/04/2022 at 3:00 pm. I have cancelled the appt with Dr. Canseco per pt request. Renetta Underwood, CMA

## 2022-11-04 ENCOUNTER — OFFICE VISIT (OUTPATIENT)
Dept: FAMILY MEDICINE | Facility: CLINIC | Age: 37
End: 2022-11-04
Payer: MEDICAID

## 2022-11-04 VITALS
OXYGEN SATURATION: 97 % | DIASTOLIC BLOOD PRESSURE: 80 MMHG | BODY MASS INDEX: 33.73 KG/M2 | WEIGHT: 188 LBS | HEART RATE: 109 BPM | TEMPERATURE: 97.8 F | SYSTOLIC BLOOD PRESSURE: 112 MMHG

## 2022-11-04 DIAGNOSIS — K21.00 GASTROESOPHAGEAL REFLUX DISEASE WITH ESOPHAGITIS, UNSPECIFIED WHETHER HEMORRHAGE: ICD-10-CM

## 2022-11-04 DIAGNOSIS — R73.01 ELEVATED FASTING BLOOD SUGAR: ICD-10-CM

## 2022-11-04 DIAGNOSIS — Z11.59 NEED FOR HEPATITIS C SCREENING TEST: ICD-10-CM

## 2022-11-04 DIAGNOSIS — R23.0 PERIORAL CYANOSIS: ICD-10-CM

## 2022-11-04 DIAGNOSIS — F20.3 UNDIFFERENTIATED SCHIZOPHRENIA (H): ICD-10-CM

## 2022-11-04 DIAGNOSIS — E03.8 OTHER SPECIFIED HYPOTHYROIDISM: ICD-10-CM

## 2022-11-04 DIAGNOSIS — R07.89 ATYPICAL CHEST PAIN: ICD-10-CM

## 2022-11-04 DIAGNOSIS — Z11.4 ENCOUNTER FOR SCREENING FOR HUMAN IMMUNODEFICIENCY VIRUS (HIV): ICD-10-CM

## 2022-11-04 DIAGNOSIS — E78.5 HYPERLIPIDEMIA LDL GOAL <130: ICD-10-CM

## 2022-11-04 DIAGNOSIS — R06.09 DOE (DYSPNEA ON EXERTION): Primary | ICD-10-CM

## 2022-11-04 DIAGNOSIS — R06.83 SNORING: ICD-10-CM

## 2022-11-04 LAB — HBA1C MFR BLD: 5.7 % (ref 0–5.6)

## 2022-11-04 PROCEDURE — 36415 COLL VENOUS BLD VENIPUNCTURE: CPT | Performed by: FAMILY MEDICINE

## 2022-11-04 PROCEDURE — 99215 OFFICE O/P EST HI 40 MIN: CPT | Performed by: FAMILY MEDICINE

## 2022-11-04 PROCEDURE — 83036 HEMOGLOBIN GLYCOSYLATED A1C: CPT | Performed by: FAMILY MEDICINE

## 2022-11-04 PROCEDURE — 86803 HEPATITIS C AB TEST: CPT | Performed by: FAMILY MEDICINE

## 2022-11-04 PROCEDURE — 87389 HIV-1 AG W/HIV-1&-2 AB AG IA: CPT | Performed by: FAMILY MEDICINE

## 2022-11-04 RX ORDER — PANTOPRAZOLE SODIUM 40 MG/1
40 TABLET, DELAYED RELEASE ORAL DAILY
Qty: 30 TABLET | Refills: 5 | Status: SHIPPED | OUTPATIENT
Start: 2022-11-04 | End: 2023-04-10

## 2022-11-04 RX ORDER — LEVOTHYROXINE SODIUM 50 UG/1
50 TABLET ORAL DAILY
Qty: 90 TABLET | Refills: 1 | Status: SHIPPED | OUTPATIENT
Start: 2022-11-04 | End: 2023-05-19

## 2022-11-04 RX ORDER — ATORVASTATIN CALCIUM 20 MG/1
20 TABLET, FILM COATED ORAL AT BEDTIME
Qty: 90 TABLET | Refills: 1 | Status: SHIPPED | OUTPATIENT
Start: 2022-11-04 | End: 2023-05-19

## 2022-11-04 ASSESSMENT — ENCOUNTER SYMPTOMS: SHORTNESS OF BREATH: 1

## 2022-11-04 ASSESSMENT — PAIN SCALES - GENERAL: PAINLEVEL: NO PAIN (0)

## 2022-11-04 NOTE — PROGRESS NOTES
Assessment & Plan     ASSESSMENT/ORDERS:    ICD-10-CM    1. LINTON (dyspnea on exertion)  R06.09 NM MPI Treadmill     Echocardiogram Complete      2. Atypical chest pain  R07.89 NM MPI Treadmill     Echocardiogram Complete      3. Perioral cyanosis  R23.0 NM MPI Treadmill     Echocardiogram Complete      4. Undifferentiated schizophrenia (H)  F20.3       5. Gastroesophageal reflux disease with esophagitis, unspecified whether hemorrhage  K21.00 pantoprazole (PROTONIX) 40 MG EC tablet      6. Snoring  R06.83 Adult Sleep Eval & Management  Referral      7. Other specified hypothyroidism  E03.8 levothyroxine (SYNTHROID/LEVOTHROID) 50 MCG tablet      8. Hyperlipidemia LDL goal <130  E78.5 atorvastatin (LIPITOR) 20 MG tablet      9. Elevated fasting blood sugar  R73.01 Hemoglobin A1c     Hemoglobin A1c      10. Encounter for screening for human immunodeficiency virus (HIV)  Z11.4 HIV Antigen Antibody Combo     HIV Antigen Antibody Combo      11. Need for hepatitis C screening test  Z11.59 Hepatitis C Screen Reflex to HCV RNA Quant and Genotype     Hepatitis C Screen Reflex to HCV RNA Quant and Genotype        PLAN:  1.  Shortness of breath on exertion, chest pain, and perioral cyanosis with possible cardiovascular cause. Discussed with patient that we would want to rule out any cardiac cause of his shortness of breath before making other recommendations. Scheduled an stress test and echocardiogram. Will follow up with me 1-2 weeks after testing is complete to review results and further evaluate.   2. Patient with significant snoring and not feeling rested in the the morning. Discussed that patient could have possible sleep apnea contributing to his symptoms. Placed referral to the sleep medicine.   3. Elevated fasting glucose of 149 with labwork on 8/21/22. Will check A1c today.   4. Hepatitis C and HIV screening completed today.   5. Medications refilled for all other above noted stable conditions.  Labs ordered  as noted above.        41 minutes spent on the date of the encounter doing chart review, review of test results, patient visit and documentation            Return in about 4 weeks (around 12/2/2022) for results review.    Patient was seen and examined by myself and Olu Joseph MD. The note was then scribed by me.     Abiolajon vIersonow, MS3  [employer]      November 4, 2022    This patient was seen and examined by myself as well as the medical student.  The medical student scribed the note and I have reviewed it, edited it appropriately, and agree with the final documentation.     Olu Joseph MD       Clau Torres is a 36 year old, presenting for the following health issues:  Establish Care and Shortness of Breath      Shortness of Breath    History of Present Illness       Reason for visit:  Heart problems  Symptom onset:  More than a month  Symptoms include:  Shortness of breath and blue lips  Symptom intensity:  Moderate  Symptom progression:  Staying the same  Had these symptoms before:  Yes  Has tried/received treatment for these symptoms:  Yes  Previous treatment was successful:  No  What makes it worse:  Exercise and warm weather  What makes it better:  No    He eats 0-1 servings of fruits and vegetables daily.He consumes 1 sweetened beverage(s) daily.He exercises with enough effort to increase his heart rate 9 or less minutes per day.  He exercises with enough effort to increase his heart rate 3 or less days per week. He is missing 3 dose(s) of medications per week.       Patient establishing care with independent living staff member. Has been experiencing exertional shortness of breath with occasional chest pain. He currently gets short of breath after one flight of stairs. Does not exercise. Chest pain can occur at any time and he has felt his heart pounding in his chest. Patient also notices that he will occasionally have a slow heart rate when standing.      His staff have also noticed that his lips turn blue around the outside of his lips at times of exertion. He also feels lightheaded and dizzy at times when standing up. Staff have also noticed significant snoring at night. Patient does not feel rested in the morning. Has an irregular sleep pattern. Does not wake up gasping for air that he is aware of. No known history of lung disease. No tobacco use. Was exposed to second hand smoke growing up.     Has chronic congestion. Discussed that improves with over the counter allergy medications.     Has a history of acid reflux. Occasional vomiting with increased acid or saliva.     Notices that his hands and feet sometimes becomes warm. Last blood sugar was elevated. Also notices leg and feet numbness that improves when he changes positions.     Follows with psychiatry for history of undifferentiated schizophrenia. Currently feeling good on his medications. Acknowledges that if he doesn't take his medications he has scary auditory and visual hallucinations. Mental health currently stable.     Review of Systems   Respiratory: Positive for shortness of breath.           Objective    /80   Pulse 109   Temp 97.8  F (36.6  C) (Temporal)   Wt 85.3 kg (188 lb)   SpO2 97%   BMI 33.73 kg/m    Body mass index is 33.73 kg/m .  Physical Exam   GENERAL: healthy, alert and no distress  HENT: hearing aid in left ear.   RESP: lungs clear to auscultation - no rales, rhonchi or wheezes  CV: regular rate and rhythm, normal S1 S2, no S3 or S4, no murmur, click or rub, no peripheral edema and peripheral pulses strong  ABDOMEN: soft, nontender, no hepatosplenomegaly, no masses and bowel sounds normal  MS: no gross musculoskeletal defects noted, no edema  PSYCH: mentation appears normal, affect normal/bright    Results for orders placed or performed in visit on 11/04/22 (from the past 24 hour(s))   Hemoglobin A1c   Result Value Ref Range    Hemoglobin A1C 5.7 (H) 0.0 - 5.6 %

## 2022-11-06 PROBLEM — R73.03 PREDIABETES: Status: ACTIVE | Noted: 2022-11-06

## 2022-11-06 NOTE — RESULT ENCOUNTER NOTE
Brian,  Your lab results that are completed up to this point show you have very mild prediabetes.  Nothing you specifically need to do except exercise more once we evaluate your heart with the stress test to verify this is safe for you to do.  We will notify you of further information as your remaining results are completed.  Please let me know if you have any questions.    Sincerely,  Dr. Joseph

## 2022-11-07 LAB
HCV AB SERPL QL IA: NONREACTIVE
HIV 1+2 AB+HIV1 P24 AG SERPL QL IA: NONREACTIVE

## 2022-11-11 NOTE — RESULT ENCOUNTER NOTE
Brian,  The rest of your results are normal.  Please let me know if you have any questions.    Sincerely,  Dr. Joseph

## 2022-12-01 ENCOUNTER — HOSPITAL ENCOUNTER (OUTPATIENT)
Dept: NUCLEAR MEDICINE | Facility: CLINIC | Age: 37
Setting detail: NUCLEAR MEDICINE
Discharge: HOME | End: 2022-12-01
Attending: FAMILY MEDICINE
Payer: MEDICAID

## 2022-12-01 ENCOUNTER — HOSPITAL ENCOUNTER (OUTPATIENT)
Dept: CARDIOLOGY | Facility: CLINIC | Age: 37
Setting detail: NUCLEAR MEDICINE
Discharge: HOME | End: 2022-12-01
Attending: FAMILY MEDICINE
Payer: MEDICAID

## 2022-12-01 DIAGNOSIS — R23.0 PERIORAL CYANOSIS: ICD-10-CM

## 2022-12-01 DIAGNOSIS — R07.89 ATYPICAL CHEST PAIN: ICD-10-CM

## 2022-12-01 DIAGNOSIS — R06.09 DOE (DYSPNEA ON EXERTION): ICD-10-CM

## 2022-12-01 LAB
CV STRESS MAX HR HE: 173
LVEF ECHO: NORMAL
RATE PRESSURE PRODUCT: NORMAL
STRESS ANGINA INDEX: 0
STRESS ECHO BASELINE DIASTOLIC HE: 80
STRESS ECHO BASELINE HR: 100 BPM
STRESS ECHO BASELINE SYSTOLIC BP: 134
STRESS ECHO CALCULATED PERCENT HR: 95 %
STRESS ECHO LAST STRESS DIASTOLIC BP: 80
STRESS ECHO LAST STRESS SYSTOLIC BP: 154
STRESS ECHO POST ESTIMATED WORKLOAD: 6.5 METS
STRESS ECHO POST EXERCISE DUR MIN: 5 MIN
STRESS ECHO POST EXERCISE DUR SEC: 0 SEC
STRESS ECHO TARGET HR: 182

## 2022-12-01 PROCEDURE — 93306 TTE W/DOPPLER COMPLETE: CPT | Mod: 26 | Performed by: INTERNAL MEDICINE

## 2022-12-01 PROCEDURE — 93306 TTE W/DOPPLER COMPLETE: CPT

## 2022-12-01 PROCEDURE — 93017 CV STRESS TEST TRACING ONLY: CPT

## 2022-12-01 PROCEDURE — 343N000001 HC RX 343: Performed by: FAMILY MEDICINE

## 2022-12-01 PROCEDURE — 93018 CV STRESS TEST I&R ONLY: CPT | Performed by: INTERNAL MEDICINE

## 2022-12-01 PROCEDURE — 93017 CV STRESS TEST TRACING ONLY: CPT | Performed by: REHABILITATION PRACTITIONER

## 2022-12-01 PROCEDURE — 78452 HT MUSCLE IMAGE SPECT MULT: CPT

## 2022-12-01 PROCEDURE — 93016 CV STRESS TEST SUPVJ ONLY: CPT | Performed by: INTERNAL MEDICINE

## 2022-12-01 PROCEDURE — A9502 TC99M TETROFOSMIN: HCPCS | Performed by: FAMILY MEDICINE

## 2022-12-01 PROCEDURE — 78452 HT MUSCLE IMAGE SPECT MULT: CPT | Mod: 26 | Performed by: INTERNAL MEDICINE

## 2022-12-01 RX ADMIN — TETROFOSMIN 32.5 MCI.: 1.38 INJECTION, POWDER, LYOPHILIZED, FOR SOLUTION INTRAVENOUS at 10:28

## 2022-12-01 RX ADMIN — TETROFOSMIN 10.2 MCI.: 1.38 INJECTION, POWDER, LYOPHILIZED, FOR SOLUTION INTRAVENOUS at 08:55

## 2022-12-12 NOTE — RESULT ENCOUNTER NOTE
Brian,  Your results are normal.  We can discuss in more detail at your upcoming follow-up appointment.    Sincerely,  Dr. Joseph

## 2023-01-20 ENCOUNTER — OFFICE VISIT (OUTPATIENT)
Dept: FAMILY MEDICINE | Facility: CLINIC | Age: 38
End: 2023-01-20
Payer: MEDICAID

## 2023-01-20 VITALS
BODY MASS INDEX: 33.87 KG/M2 | DIASTOLIC BLOOD PRESSURE: 78 MMHG | HEART RATE: 104 BPM | WEIGHT: 191.13 LBS | SYSTOLIC BLOOD PRESSURE: 100 MMHG | TEMPERATURE: 98.6 F | OXYGEN SATURATION: 95 % | HEIGHT: 63 IN

## 2023-01-20 DIAGNOSIS — T88.7XXA MEDICATION SIDE EFFECT: ICD-10-CM

## 2023-01-20 DIAGNOSIS — R73.03 PREDIABETES: ICD-10-CM

## 2023-01-20 DIAGNOSIS — F20.3 UNDIFFERENTIATED SCHIZOPHRENIA (H): ICD-10-CM

## 2023-01-20 DIAGNOSIS — E66.9 OBESITY (BMI 30-39.9): ICD-10-CM

## 2023-01-20 DIAGNOSIS — R06.09 DOE (DYSPNEA ON EXERTION): Primary | ICD-10-CM

## 2023-01-20 PROCEDURE — 99214 OFFICE O/P EST MOD 30 MIN: CPT | Performed by: FAMILY MEDICINE

## 2023-01-20 ASSESSMENT — PATIENT HEALTH QUESTIONNAIRE - PHQ9
10. IF YOU CHECKED OFF ANY PROBLEMS, HOW DIFFICULT HAVE THESE PROBLEMS MADE IT FOR YOU TO DO YOUR WORK, TAKE CARE OF THINGS AT HOME, OR GET ALONG WITH OTHER PEOPLE: SOMEWHAT DIFFICULT
SUM OF ALL RESPONSES TO PHQ QUESTIONS 1-9: 21
SUM OF ALL RESPONSES TO PHQ QUESTIONS 1-9: 21

## 2023-01-20 ASSESSMENT — PAIN SCALES - GENERAL: PAINLEVEL: MODERATE PAIN (4)

## 2023-01-20 NOTE — PATIENT INSTRUCTIONS
Sleep clinic number:  [facility] will call you to coordinate your care as prescribed by your provider. If you don't hear from a representative within 2 business days, please call [phone]

## 2023-01-20 NOTE — PROGRESS NOTES
Assessment & Plan     ASSESSMENT/ORDERS:    ICD-10-CM    1. LINTON (dyspnea on exertion)  R06.09       2. Medication side effect  T88.7XXA       3. Undifferentiated schizophrenia (H)  F20.3       4. Prediabetes  R73.03 Nutrition Referral      5. Obesity (BMI 30-39.9)  E66.9 Nutrition Referral        PLAN:  1.  Patient feels his schizophrenia medication is responsible for his shortness of breath.  He will work with his psychiatrist on alternative medication to better manage symptoms along with less side effects issue.  2.  Referral to nutrition for prediabetes and obesity discussion and reduce progression to diabetes.            Depression Screening Follow Up    PHQ 1/20/2023   PHQ-9 Total Score 21   Q9: Thoughts of better off dead/self-harm past 2 weeks Nearly every day   F/U: Thoughts of suicide or self-harm Yes   F/U: Self harm-plan No   F/U: Self-harm action No   F/U: Safety concerns No           Follow Up    Follow Up Actions Taken    Discussed the following ways the patient can remain in a safe environment:  see above      Return in about 3 months (around 4/20/2023) for breathing, exercising and check on mental health medication changes with psychiatry.    Olu Josehp MD  [facility]    Clau Torres is a 37 year old presenting for the following health issues:  Follow Up (Follow up on results)      History of Present Illness       Vascular Disease:  He presents for follow up of vascular disease.  He never takes nitroglycerin. He is not taking daily aspirin.    He eats 2-3 servings of fruits and vegetables daily.He consumes 1 sweetened beverage(s) daily.He exercises with enough effort to increase his heart rate 9 or less minutes per day.  He exercises with enough effort to increase his heart rate 3 or less days per week. He is missing 3 dose(s) of medications per week.  He is not taking prescribed medications regularly due to remembering to take.    Today's PHQ-9        "  PHQ-9 Total Score: 21    PHQ-9 Q9 Thoughts of better off dead/self-harm past 2 weeks :   Nearly every day  Thoughts of suicide or self harm: (P) Yes  Self-harm Plan:   (P) No  Self-harm Action:     (P) No  Safety concerns for self or others: (P) No    How difficult have these problems made it for you to do your work, take care of things at home, or get along with other people: Somewhat difficult           Cut down his FANAPT on his own to once a day a week before cardiac stress test to see if his breathing would improve.      Started on FANAPT to control visual and auditory hallucinations.  No return of hallucinations since cutting down to once daily.      Does not know when he is seeing psychiatrist again, but saw them 3 months ago.  Started FANAPT in 2020 and was on low dose, increased due to need for hallucinations.  Now he is thinking that he is havign dyspnea on exertion from the medication.    Elevated fasting blood sugar with last visit, hemoglobin A1c done as follow-up.  Lab Results   Component Value Date    A1C 5.7 11/04/2022    A1C 5.3 08/04/2021          Review of Systems         Objective    /78   Pulse 104   Temp 98.6  F (37  C) (Tympanic)   Ht 1.605 m (5' 3.2\")   Wt 86.7 kg (191 lb 2 oz)   SpO2 95%   BMI 33.64 kg/m    Body mass index is 33.64 kg/m .  Physical Exam  Constitutional:       Appearance: He is well-developed. He is obese.   Cardiovascular:      Rate and Rhythm: Normal rate and regular rhythm.      Heart sounds: Normal heart sounds, S1 normal and S2 normal. No murmur heard.  Pulmonary:      Effort: Pulmonary effort is normal. No respiratory distress.      Breath sounds: Normal breath sounds. No wheezing, rhonchi or rales.   Neurological:      Mental Status: He is alert.                            "

## 2023-01-24 ENCOUNTER — TELEPHONE (OUTPATIENT)
Dept: FAMILY MEDICINE | Facility: CLINIC | Age: 38
End: 2023-01-24
Payer: MEDICAID

## 2023-01-24 NOTE — TELEPHONE ENCOUNTER
Nutrition Education Scheduling Outreach #1:    Call to patient to schedule. Left message with phone number to call to schedule.    Plan for 2nd outreach attempt within 1 week.    [facility]  Diabetes and Nutrition Scheduling

## 2023-02-02 NOTE — TELEPHONE ENCOUNTER
Nutrition Education Scheduling Outreach #2:    Call to patient to schedule. Patient declined to schedule.        Odin Coleman  [facility]  Diabetes and Nutrition Scheduling

## 2023-02-28 ENCOUNTER — NURSE TRIAGE (OUTPATIENT)
Dept: FAMILY MEDICINE | Facility: CLINIC | Age: 38
End: 2023-02-28
Payer: MEDICAID

## 2023-02-28 NOTE — TELEPHONE ENCOUNTER
Is this a 2nd Level Triage? YES, LICENSED PRACTITIONER REVIEW IS REQUIRED    SITUATION:   Diarrhea    BACKGROUND:   Patient has been sick for 2 weeks. Symptoms started with.   Symptoms started several days ago, last Thursday. Patient recently came down with an illness. Took a COVID test that was faintly positive on Saturday/ (/-).   Patient has had 20 loose stool episodes. The stool is watery, green/brown in color, and no blood or pus.   Patient had a day of vomiting 6 days ago and has not vomited since.   Nausea is present and constant.   Abdominal pain - located where stomach is above the belly button, constant, 7/10, sharp and is slightly doubling over. Pain is centralized.   Oral intake- patient has been able to keep fluids down.   Patient dose have a dry mouth,   Denies feeling dizzy or too weak to stand. Unsure of weight loss.   Patient is unsure if he has been exposed to someone with similar symptoms, recent travel.  The patient drank some  prune juice 3 days ago.   Denies antibiotic use over past 2 months.   Unsure if he has had a fever.     HOME TREATMENTS:  Drank water    NURSE RECOMMENDATION:       PLAN:   Patient is going to try to go to urgent care.     Routed to provider    Does the patient meet one of the following criteria for ADS visit consideration? 16+ years old, with an [facility] PCP     TIP  Providers, please consider if this condition is appropriate for management at one of our Acute and Diagnostic Services sites.     If patient is a good candidate, please use dotphrase <dot>triageresponse and select Refer to ADS to document.      KATIE ArriagaN, RN, PHN  [place] [facility]  2023    Reason for Disposition    SEVERE diarrhea (e.g., 7 or more times / day more than normal) and present > 24 hours (1 day)    Additional Information    Negative: Shock suspected (e.g., cold/pale/clammy skin, too weak to stand, low BP, rapid pulse)    Negative: Difficult  to awaken or acting confused (e.g., disoriented, slurred speech)    Negative: Sounds like a life-threatening emergency to the triager    Negative: Vomiting also present and worse than the diarrhea    Negative: Blood in stool and without diarrhea    Negative: SEVERE abdominal pain (e.g., excruciating) and present > 1 hour    Negative: SEVERE abdominal pain and age > 60 years    Negative: Bloody, black, or tarry bowel movements (Exception: chronic-unchanged black-grey bowel movements and is taking iron pills or Pepto-Bismol)    Negative: SEVERE diarrhea (e.g., 7 or more times / day more than normal) and age > 60 years    Negative: Constant abdominal pain lasting > 2 hours    Negative: Drinking very little and has signs of dehydration (e.g., no urine > 12 hours, very dry mouth, very lightheaded)    Negative: Patient sounds very sick or weak to the triager    Protocols used: DIARRHEA-A-OH

## 2023-03-02 ENCOUNTER — APPOINTMENT (OUTPATIENT)
Dept: CT IMAGING | Facility: CLINIC | Age: 38
End: 2023-03-02
Attending: FAMILY MEDICINE
Payer: MEDICAID

## 2023-03-02 ENCOUNTER — HOSPITAL ENCOUNTER (EMERGENCY)
Facility: CLINIC | Age: 38
Discharge: HOME | End: 2023-03-02
Attending: FAMILY MEDICINE | Admitting: FAMILY MEDICINE
Payer: MEDICAID

## 2023-03-02 VITALS
OXYGEN SATURATION: 98 % | RESPIRATION RATE: 16 BRPM | SYSTOLIC BLOOD PRESSURE: 113 MMHG | DIASTOLIC BLOOD PRESSURE: 76 MMHG | HEART RATE: 88 BPM

## 2023-03-02 DIAGNOSIS — R19.7 VOMITING AND DIARRHEA: ICD-10-CM

## 2023-03-02 DIAGNOSIS — R10.84 ABDOMINAL PAIN, GENERALIZED: ICD-10-CM

## 2023-03-02 DIAGNOSIS — R11.10 VOMITING AND DIARRHEA: ICD-10-CM

## 2023-03-02 LAB
ALBUMIN SERPL BCG-MCNC: 3.9 G/DL (ref 3.5–5.2)
ALP SERPL-CCNC: 94 U/L (ref 40–129)
ALT SERPL W P-5'-P-CCNC: 45 U/L (ref 10–50)
ANION GAP SERPL CALCULATED.3IONS-SCNC: 14 MMOL/L (ref 7–15)
AST SERPL W P-5'-P-CCNC: 49 U/L (ref 10–50)
BASOPHILS # BLD MANUAL: 0 10E3/UL (ref 0–0.2)
BASOPHILS NFR BLD MANUAL: 0 %
BILIRUB SERPL-MCNC: 0.4 MG/DL
BUN SERPL-MCNC: 12.5 MG/DL (ref 6–20)
CALCIUM SERPL-MCNC: 9.3 MG/DL (ref 8.6–10)
CHLORIDE SERPL-SCNC: 103 MMOL/L (ref 98–107)
CREAT SERPL-MCNC: 1.11 MG/DL (ref 0.67–1.17)
DEPRECATED HCO3 PLAS-SCNC: 23 MMOL/L (ref 22–29)
EOSINOPHIL # BLD MANUAL: 0 10E3/UL (ref 0–0.7)
EOSINOPHIL NFR BLD MANUAL: 0 %
ERYTHROCYTE [DISTWIDTH] IN BLOOD BY AUTOMATED COUNT: 13.2 % (ref 10–15)
GFR SERPL CREATININE-BSD FRML MDRD: 87 ML/MIN/1.73M2
GLUCOSE SERPL-MCNC: 116 MG/DL (ref 70–99)
HCT VFR BLD AUTO: 44.1 % (ref 40–53)
HGB BLD-MCNC: 15 G/DL (ref 13.3–17.7)
LIPASE SERPL-CCNC: 16 U/L (ref 13–60)
LYMPHOCYTES # BLD MANUAL: 3.7 10E3/UL (ref 0.8–5.3)
LYMPHOCYTES NFR BLD MANUAL: 27 %
MCH RBC QN AUTO: 28.7 PG (ref 26.5–33)
MCHC RBC AUTO-ENTMCNC: 34 G/DL (ref 31.5–36.5)
MCV RBC AUTO: 84 FL (ref 78–100)
MONOCYTES # BLD MANUAL: 0.7 10E3/UL (ref 0–1.3)
MONOCYTES NFR BLD MANUAL: 5 %
NEUTROPHILS # BLD MANUAL: 9.2 10E3/UL (ref 1.6–8.3)
NEUTROPHILS NFR BLD MANUAL: 68 %
PLAT MORPH BLD: ABNORMAL
PLATELET # BLD AUTO: 225 10E3/UL (ref 150–450)
POTASSIUM SERPL-SCNC: 3.7 MMOL/L (ref 3.4–5.3)
PROT SERPL-MCNC: 7.1 G/DL (ref 6.4–8.3)
RBC # BLD AUTO: 5.23 10E6/UL (ref 4.4–5.9)
RBC MORPH BLD: ABNORMAL
SODIUM SERPL-SCNC: 140 MMOL/L (ref 136–145)
WBC # BLD AUTO: 13.6 10E3/UL (ref 4–11)

## 2023-03-02 PROCEDURE — 250N000011 HC RX IP 250 OP 636: Performed by: FAMILY MEDICINE

## 2023-03-02 PROCEDURE — 99285 EMERGENCY DEPT VISIT HI MDM: CPT | Mod: 25 | Performed by: FAMILY MEDICINE

## 2023-03-02 PROCEDURE — 96374 THER/PROPH/DIAG INJ IV PUSH: CPT | Mod: 59 | Performed by: FAMILY MEDICINE

## 2023-03-02 PROCEDURE — 99284 EMERGENCY DEPT VISIT MOD MDM: CPT | Performed by: FAMILY MEDICINE

## 2023-03-02 PROCEDURE — 96361 HYDRATE IV INFUSION ADD-ON: CPT | Performed by: FAMILY MEDICINE

## 2023-03-02 PROCEDURE — 83690 ASSAY OF LIPASE: CPT | Performed by: FAMILY MEDICINE

## 2023-03-02 PROCEDURE — 250N000009 HC RX 250: Performed by: FAMILY MEDICINE

## 2023-03-02 PROCEDURE — 85014 HEMATOCRIT: CPT | Performed by: FAMILY MEDICINE

## 2023-03-02 PROCEDURE — 80053 COMPREHEN METABOLIC PANEL: CPT | Performed by: FAMILY MEDICINE

## 2023-03-02 PROCEDURE — 36415 COLL VENOUS BLD VENIPUNCTURE: CPT | Performed by: FAMILY MEDICINE

## 2023-03-02 PROCEDURE — C9113 INJ PANTOPRAZOLE SODIUM, VIA: HCPCS | Performed by: FAMILY MEDICINE

## 2023-03-02 PROCEDURE — 74177 CT ABD & PELVIS W/CONTRAST: CPT

## 2023-03-02 PROCEDURE — 258N000003 HC RX IP 258 OP 636: Performed by: FAMILY MEDICINE

## 2023-03-02 PROCEDURE — 96375 TX/PRO/DX INJ NEW DRUG ADDON: CPT | Mod: 59 | Performed by: FAMILY MEDICINE

## 2023-03-02 PROCEDURE — 85007 BL SMEAR W/DIFF WBC COUNT: CPT | Performed by: FAMILY MEDICINE

## 2023-03-02 RX ORDER — IOPAMIDOL 755 MG/ML
500 INJECTION, SOLUTION INTRAVASCULAR ONCE
Status: COMPLETED | OUTPATIENT
Start: 2023-03-02 | End: 2023-03-02

## 2023-03-02 RX ORDER — METOCLOPRAMIDE 5 MG/1
5 TABLET ORAL 3 TIMES DAILY PRN
Qty: 15 TABLET | Refills: 0 | Status: SHIPPED | OUTPATIENT
Start: 2023-03-02 | End: 2023-04-10

## 2023-03-02 RX ORDER — SODIUM CHLORIDE 9 MG/ML
INJECTION, SOLUTION INTRAVENOUS CONTINUOUS
Status: DISCONTINUED | OUTPATIENT
Start: 2023-03-02 | End: 2023-03-02 | Stop reason: HOSPADM

## 2023-03-02 RX ORDER — ONDANSETRON 2 MG/ML
4 INJECTION INTRAMUSCULAR; INTRAVENOUS EVERY 30 MIN PRN
Status: DISCONTINUED | OUTPATIENT
Start: 2023-03-02 | End: 2023-03-02 | Stop reason: HOSPADM

## 2023-03-02 RX ADMIN — ONDANSETRON 4 MG: 2 INJECTION INTRAMUSCULAR; INTRAVENOUS at 06:27

## 2023-03-02 RX ADMIN — SODIUM CHLORIDE 60 ML: 9 INJECTION, SOLUTION INTRAVENOUS at 06:42

## 2023-03-02 RX ADMIN — IOPAMIDOL 89 ML: 755 INJECTION, SOLUTION INTRAVENOUS at 06:41

## 2023-03-02 RX ADMIN — PANTOPRAZOLE SODIUM 40 MG: 40 INJECTION, POWDER, FOR SOLUTION INTRAVENOUS at 06:32

## 2023-03-02 RX ADMIN — SODIUM CHLORIDE 1000 ML: 9 INJECTION, SOLUTION INTRAVENOUS at 06:27

## 2023-03-02 ASSESSMENT — ACTIVITIES OF DAILY LIVING (ADL): ADLS_ACUITY_SCORE: 35

## 2023-03-02 NOTE — ED PROVIDER NOTES
History     Chief Complaint   Patient presents with     Abdominal Pain     HPI  Brian Ortiz is a 37 year old male who presents to the ED with concerns about upper abdominal pain that is intermittent for the past 1 to 2 weeks.  Has also had green watery diarrhea for over a week.  No blood.  Usually once a day.  Has vomited about twice a week, the last was this morning it was brown in color.  No fevers chills or sweats.  No travel or exposures that he is aware of.  No abdominal surgeries.  He uses ibuprofen maybe a couple of times a year, certainly not on a chronic basis.  He does not smoke drink or use drugs.  No history of ulcers.      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     Priority: Medium     ADHD (attention deficit hyperactivity disorder), combined type 07/12/2020     Priority: Medium     Benign non-nodular prostatic hyperplasia with lower urinary tract symptoms 08/31/2016     Priority: Medium     Hearing decreased 04/22/2015     Priority: Medium     BPPV (benign paroxysmal positional vertigo) 04/22/2015     Priority: Medium     Anal fissure 05/06/2011     Priority: Medium     Acne 05/06/2011     Priority: Medium     CARDIOVASCULAR SCREENING; LDL GOAL LESS THAN 160 10/31/2010     Priority: Medium     Constipation 01/09/2008     Priority: Medium     Problem list name updated by automated process. Provider to review       Scoliosis associated with other condition 01/14/2005     Priority: Medium     Hearing loss 01/14/2005      Priority: Medium     Problem list name updated by automated process. Provider to review       Multiple congenital malformations, not elsewhere classified 01/14/2005     Priority: Medium     Diagnosis updated by automated process. Provider to review and confirm.          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  Bilateral sagittal split ramus osteotomy.  [facility]     ZZ CREATE EARDRUM OPENING,GEN ANESTH      P.E. Tubes       Family History:    Family History   Adopted: Yes   Problem Relation Age of Onset     Substance Abuse Mother      Substance Abuse Father        Social History:  Marital Status:  Single [1]  Social History     Tobacco Use     Smoking status: Never     Smokeless tobacco: Never   Vaping Use     Vaping Use: Never used   Substance Use Topics     Alcohol use: No     Alcohol/week: 0.0 standard drinks     Drug use: No        Medications:    atorvastatin (LIPITOR) 20 MG tablet  FANAPT 12 MG TABS  levothyroxine (SYNTHROID/LEVOTHROID) 50 MCG tablet  pantoprazole (PROTONIX) 40 MG EC tablet  sertraline (ZOLOFT) 100 MG tablet          Review of Systems   All other systems reviewed and are negative.      Physical Exam   BP: 114/78  Pulse: (!) 124  SpO2: 94 %      Physical Exam  Constitutional:       General: He is not in acute distress.     Appearance: He is well-developed.   HENT:      Mouth/Throat:      Mouth: Mucous membranes are moist.      Pharynx: Oropharynx is clear.   Cardiovascular:      Rate and  Rhythm: Normal rate and regular rhythm.   Pulmonary:      Effort: Pulmonary effort is normal.      Breath sounds: Normal breath sounds.   Abdominal:      General: Abdomen is flat.      Tenderness: There is abdominal tenderness (mostly in Epigastric) in the right upper quadrant, epigastric area, suprapubic area, left upper quadrant and left lower quadrant. There is no right CVA tenderness or left CVA tenderness.   Musculoskeletal:         General: Normal range of motion.   Skin:     General: Skin is warm and dry.   Neurological:      General: No focal deficit present.      Mental Status: He is alert and oriented to person, place, and time.         ED Course                 Procedures              Critical Care time:  none              Medications   0.9% sodium chloride BOLUS (1,000 mLs Intravenous $New Bag 3/2/23 0627)     Followed by   sodium chloride 0.9% infusion (has no administration in time range)   ondansetron (ZOFRAN) injection 4 mg (4 mg Intravenous $Given 3/2/23 0627)   pantoprazole (PROTONIX) IV push injection 40 mg (40 mg Intravenous $Given 3/2/23 0632)   iopamidol (ISOVUE-370) solution 500 mL (89 mLs Intravenous $Given 3/2/23 0641)   new 100 ml saline bag (60 mLs Intravenous $Given 3/2/23 0642)       Assessments & Plan (with Medical Decision Making)  37-year-old with 1 to 2-week history of upper abdominal pain intermittent nausea and vomiting and daily diarrhea.  No blood in the stools.  Had emesis this morning which was brown in color.  Dr. Joseph assumed his care at change of shift.  Please see her note for lab/CT results, final diagnosis and disposition.     I have reviewed the nursing notes.    I have reviewed the findings, diagnosis, plan and need for follow up with the patient.             New Prescriptions    No medications on file         3/2/2023   [facility] EMERGENCY DEPT     Jesus Smith MD  03/02/23 0792

## 2023-03-02 NOTE — DISCHARGE INSTRUCTIONS
Your blood work and CT scan did not show any emergent cause for your symptoms    Your CT showed some distention of your stomach, which may be normal.  Did not see any blockages or obstruction throughout your bowel    A new prescription to help with your symptoms was sent to the pharmacy.  You may take this up to 3 times daily to help with symptoms of nausea, vomiting, or abdominal pain    Follow-up closely with your primary doctor within 1 week.  May need to have them run additional testing or evaluation for your symptoms if it persists    If you develop any new or worsening symptoms, such as vomiting and cannot keep anything down even sips of water, are running a fever, or have any new concerns

## 2023-03-02 NOTE — ED PROVIDER NOTES
Signout received at change of shift from Dr. Javed Mccarty.  See his note for patient presenting details and initial work-up.  Briefly, this is a 37-year-old male presenting to the emergency room with abdominal pain for the last 1 week, has had some nonbloody emesis and diarrhea.  Awaiting remainder of labs and CT of the abdomen pelvis for evaluation.    See labs and imaging as below.  No acute worrisome findings.  There is moderate distention of the stomach but no sign of acute obstruction of the bowel, no inflammatory changes, no other findings to explain the patient's acute symptoms.  We will send a prescription for Reglan to the pharmacy to help with the nausea and vomiting.  Distention of stomach may be due to delayed emptying, Reglan may help.  Recommended close follow-up with his primary provider and to return to the emergency room if any new or worsening symptoms develop.  All questions were answered and he was discharged in no distress.    Results for orders placed or performed during the hospital encounter of 03/02/23   CT Abdomen Pelvis w Contrast     Status: None    Narrative    EXAM: CT ABDOMEN PELVIS W CONTRAST  LOCATION: [facility]  DATE/TIME: 3/2/2023 6:52 AM    INDICATION: upper abd pain x 1 week, N V D  COMPARISON: None.  TECHNIQUE: CT scan of the abdomen and pelvis was performed following injection of IV contrast. Multiplanar reformats were obtained. Dose reduction techniques were used.  CONTRAST: 89 mL Isovue 370    FINDINGS:   LOWER CHEST: Normal.    HEPATOBILIARY: No significant mass or bile duct dilatation. No calcified gallstones.     PANCREAS: Normal.    SPLEEN: Normal.    ADRENAL GLANDS: Normal.    KIDNEYS/BLADDER: No significant mass, stone, or hydronephrosis.    BOWEL: Moderate distention of the stomach with fluid. Remainder of the bowel is unremarkable without evidence of obstruction or inflammatory change. Normal appendix.    LYMPH NODES:  Normal.    VASCULATURE: Unremarkable.    PELVIC ORGANS: Normal.    MUSCULOSKELETAL: Normal.      Impression    IMPRESSION:   1.  Moderate gastric distention. Otherwise, no acute process in the abdomen or pelvis.   Comprehensive metabolic panel     Status: Abnormal   Result Value Ref Range    Sodium 140 136 - 145 mmol/L    Potassium 3.7 3.4 - 5.3 mmol/L    Chloride 103 98 - 107 mmol/L    Carbon Dioxide (CO2) 23 22 - 29 mmol/L    Anion Gap 14 7 - 15 mmol/L    Urea Nitrogen 12.5 6.0 - 20.0 mg/dL    Creatinine 1.11 0.67 - 1.17 mg/dL    Calcium 9.3 8.6 - 10.0 mg/dL    Glucose 116 (H) 70 - 99 mg/dL    Alkaline Phosphatase 94 40 - 129 U/L    AST 49 10 - 50 U/L    ALT 45 10 - 50 U/L    Protein Total 7.1 6.4 - 8.3 g/dL    Albumin 3.9 3.5 - 5.2 g/dL    Bilirubin Total 0.4 <=1.2 mg/dL    GFR Estimate 87 >60 mL/min/1.73m2   Lipase     Status: Normal   Result Value Ref Range    Lipase 16 13 - 60 U/L   CBC with platelets and differential     Status: Abnormal   Result Value Ref Range    WBC Count 13.6 (H) 4.0 - 11.0 10e3/uL    RBC Count 5.23 4.40 - 5.90 10e6/uL    Hemoglobin 15.0 13.3 - 17.7 g/dL    Hematocrit 44.1 40.0 - 53.0 %    MCV 84 78 - 100 fL    MCH 28.7 26.5 - 33.0 pg    MCHC 34.0 31.5 - 36.5 g/dL    RDW 13.2 10.0 - 15.0 %    Platelet Count 225 150 - 450 10e3/uL   Manual Differential     Status: Abnormal   Result Value Ref Range    % Neutrophils 68 %    % Lymphocytes 27 %    % Monocytes 5 %    % Eosinophils 0 %    % Basophils 0 %    Absolute Neutrophils 9.2 (H) 1.6 - 8.3 10e3/uL    Absolute Lymphocytes 3.7 0.8 - 5.3 10e3/uL    Absolute Monocytes 0.7 0.0 - 1.3 10e3/uL    Absolute Eosinophils 0.0 0.0 - 0.7 10e3/uL    Absolute Basophils 0.0 0.0 - 0.2 10e3/uL    RBC Morphology Confirmed RBC Indices     Platelet Assessment  Automated Count Confirmed. Platelet morphology is normal.     Automated Count Confirmed. Platelet morphology is normal.   CBC with platelets differential     Status: Abnormal    Narrative    The following  orders were created for panel order CBC with platelets differential.  Procedure                               Abnormality         Status                     ---------                               -----------         ------                     CBC with platelets and d...[[id number]]  Abnormal            Final result               Manual Differential[[id number]]          Abnormal            Final result                 Please view results for these tests on the individual orders.        Maureen Joseph,   03/02/23 1439

## 2023-03-02 NOTE — ED TRIAGE NOTES
Pt presents today c/o abdominal pain, nausea, vomiting and diarrhea for over a week. Pt states that he has been vomiting twice a week. Stool has become more formed today.      Triage Assessment     Row Name 03/02/23 0557       Triage Assessment (Adult)    Airway WDL WDL       Respiratory WDL    Respiratory WDL WDL       Skin Circulation/Temperature WDL    Skin Circulation/Temperature WDL WDL       Cardiac WDL    Cardiac WDL WDL       Peripheral/Neurovascular WDL    Peripheral Neurovascular WDL WDL       Cognitive/Neuro/Behavioral WDL    Cognitive/Neuro/Behavioral WDL WDL

## 2023-03-09 ENCOUNTER — TELEPHONE (OUTPATIENT)
Dept: FAMILY MEDICINE | Facility: CLINIC | Age: 38
End: 2023-03-09

## 2023-03-09 NOTE — TELEPHONE ENCOUNTER
Reason for Call:  Appointment Request    Patient requesting this type of appt:  Hospital/ED Follow-Up     Requested provider: Olu Joseph    Reason patient unable to be scheduled: Not within requested timeframe    When does patient want to be seen/preferred time: 3-7 days    Comments: Pt had to cancel appt for 03/09 with Jake. Needs to reschedule in appropriate time frame from hospital visit. (within one week of discharge) Please reach out to pt.     Could we send this information to you in [employer] or would you prefer to receive a phone call?:   Patient would prefer a phone call   Okay to leave a detailed message?: Yes at Cell number on file:    Telephone Information:   Mobile [phone]       Call taken on 3/9/2023 at 2:16 PM by Abiola May

## 2023-03-10 NOTE — TELEPHONE ENCOUNTER
"If he is talking about the ER visit on 3/2, it has already been 8 days.  Sending to RN triage to see if symptoms are same or worse as in the ER.  If better at all, then follow-up in the next week or two is fine.  Any available same day slot or \"provider approval required\" is fine.  Schedule for 40 min appointment even if in 20 min slot.     Olu Joseph MD   "

## 2023-03-13 NOTE — TELEPHONE ENCOUNTER
"Attempt #2    RN has attempted to contact this patient by phone to return their call, but there is no response.  Left message to \"call clinic at earliest convenience\".  Will try again later.     KATIE RichN, RN       "

## 2023-04-10 ENCOUNTER — OFFICE VISIT (OUTPATIENT)
Dept: FAMILY MEDICINE | Facility: CLINIC | Age: 38
End: 2023-04-10
Payer: MEDICAID

## 2023-04-10 VITALS
WEIGHT: 183.8 LBS | BODY MASS INDEX: 32.57 KG/M2 | DIASTOLIC BLOOD PRESSURE: 64 MMHG | SYSTOLIC BLOOD PRESSURE: 100 MMHG | HEART RATE: 118 BPM | OXYGEN SATURATION: 97 % | HEIGHT: 63 IN | TEMPERATURE: 98.2 F | RESPIRATION RATE: 20 BRPM

## 2023-04-10 DIAGNOSIS — R06.09 DOE (DYSPNEA ON EXERTION): ICD-10-CM

## 2023-04-10 DIAGNOSIS — R19.7 DIARRHEA, UNSPECIFIED TYPE: ICD-10-CM

## 2023-04-10 DIAGNOSIS — R00.0 TACHYCARDIA: Primary | ICD-10-CM

## 2023-04-10 DIAGNOSIS — R10.84 ABDOMINAL PAIN, GENERALIZED: ICD-10-CM

## 2023-04-10 DIAGNOSIS — K21.00 GASTROESOPHAGEAL REFLUX DISEASE WITH ESOPHAGITIS, UNSPECIFIED WHETHER HEMORRHAGE: ICD-10-CM

## 2023-04-10 PROCEDURE — 93000 ELECTROCARDIOGRAM COMPLETE: CPT | Performed by: FAMILY MEDICINE

## 2023-04-10 PROCEDURE — 99214 OFFICE O/P EST MOD 30 MIN: CPT | Performed by: FAMILY MEDICINE

## 2023-04-10 RX ORDER — PANTOPRAZOLE SODIUM 40 MG/1
40 TABLET, DELAYED RELEASE ORAL DAILY
Qty: 30 TABLET | Refills: 5 | Status: SHIPPED | OUTPATIENT
Start: 2023-04-10 | End: 2023-09-07

## 2023-04-10 ASSESSMENT — PAIN SCALES - GENERAL: PAINLEVEL: NO PAIN (0)

## 2023-04-10 NOTE — PROGRESS NOTES
"  Assessment & Plan     ASSESSMENT/ORDERS:    ICD-10-CM    1. Tachycardia  R00.0 Catecholamines fractioned free urine     Metanephrine random or 24 hr urine     EKG 12-lead complete w/read - Clinics     Adult Cardiology Eval  Referral     Adult Leadless EKG Monitor 3 to 7 Days     Catecholamines fractioned free urine     Metanephrine random or 24 hr urine      2. [place] (dyspnea on exertion)  R06.09 Catecholamines fractioned free urine     Metanephrine random or 24 hr urine     EKG 12-lead complete w/read - Clinics     Adult Cardiology Eval  Referral     Adult Leadless EKG Monitor 3 to 7 Days     Catecholamines fractioned free urine     Metanephrine random or 24 hr urine      3. Gastroesophageal reflux disease with esophagitis, unspecified whether hemorrhage  K21.00 pantoprazole (PROTONIX) 40 MG EC tablet      4. Diarrhea, unspecified type  R19.7       5. Abdominal pain, generalized  R10.84         PLAN:  1.  24 hour urine testing to rule out cause of tachycardia.  2.  3 day zio patch for more information regarding his ongoing resting tachycardia.  3.  Referral to cardiology for further evaluation on resting sinus tachycardia.   4.  Discussed that his diarrhea could be from unsafe food handling practices.             MED REC REQUIRED  Post Medication Reconciliation Status: discharge medications reconciled and changed, per note/orders  BMI:   Estimated body mass index is 32.49 kg/m  as calculated from the following:    Height as of this encounter: 1.602 m (5' 3.07\").    Weight as of this encounter: 83.4 kg (183 lb 12.8 oz).   Weight management plan: Discussed healthy diet and exercise guidelines        Olu Joseph MD  [facility]    Clau Torres is a 37 year old, presenting for the following health issues:  ER F/U and Shortness of Breath        4/10/2023     8:45 AM   Additional Questions   Roomed by Shelli CARDONA   Accompanied by Jannet ([place] Specialist)         " "4/10/2023     8:45 AM   Patient Reported Additional Medications   Patient reports taking the following new medications None     HPI     ED/UC Followup:    Facility:  [facility]  Date of visit: 03/02/2023  Reason for visit: Abdominal pain  Current Status: Feels better but now having shortness of breath with activity      Abdominal pain and diarrhea improved.  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  Continues to do this until all patties are gone.    Continues to have resting tachycardia, dyspnea on exertion and always seems winded.  He understands he is out of shape, but has had long standing issues with elevated heart rate.  TSH normal last fall.  Recent CBC within normal limits.  Had normal cardiac stress test 4 months ago.  Has no wheezing or asthma history.     Did think his psychiatric medications were leading to chest pain, working with psyciatrist on different medication, chest pain improved on lower dose of FANAPT.        Review of Systems         Objective    /64 (BP Location: Right arm, Patient Position: Sitting, Cuff Size: Adult Large)   Pulse 118   Temp 98.2  F (36.8  C) (Temporal)   Resp 20   Ht 1.602 m (5' 3.07\")   Wt 83.4 kg (183 lb 12.8 oz)   SpO2 97%   BMI 32.49 kg/m    Body mass index is 32.49 kg/m .  Physical Exam  Constitutional:       Appearance: He is well-developed.   Cardiovascular:      Rate and Rhythm: Regular rhythm. Tachycardia present.      Heart sounds: Normal heart sounds, S1 normal and S2 normal. No murmur heard.  Pulmonary:      Effort: Pulmonary effort is normal. No respiratory distress.      Breath sounds: Normal breath sounds. No wheezing, rhonchi or rales.   Neurological:      Mental Status: He is alert.            EKG - Reviewed and interpreted by me sinus tachycardia, normal axis, normal intervals, no acute ST/T changes c/w ischemia, no LVH by " voltage criteria

## 2023-04-11 PROCEDURE — 82384 ASSAY THREE CATECHOLAMINES: CPT | Mod: 90 | Performed by: FAMILY MEDICINE

## 2023-04-11 PROCEDURE — 83835 ASSAY OF METANEPHRINES: CPT | Mod: 90 | Performed by: FAMILY MEDICINE

## 2023-04-15 LAB
CATECHOLS UR-IMP: NORMAL
CREAT 24H UR-MRATE: 1448 MG/D
CREAT UR-MCNC: 181 MG/DL
DOPAMINE 24H UR-MRATE: 229 UG/D
DOPAMINE UR-MCNC: 286 UG/L
DOPAMINE/CREAT UR: 158 UG/G CRT
EPINEPH 24H UR-MRATE: 5 UG/D
EPINEPH UR-MCNC: 6 UG/L
EPINEPH/CREAT UR: 3 UG/G CRT
NOREPINEPH 24H UR-MRATE: 30 UG/D
NOREPINEPH UR-MCNC: 37 UG/L
NOREPINEPH/CREAT UR: 20 UG/G CRT

## 2023-04-16 LAB
CREAT 24H UR-MRATE: NORMAL MG/D
CREAT UR-MCNC: 181 MG/DL
METANEPH 24H UR-MCNC: 230 UG/L
METANEPH 24H UR-MRATE: NORMAL UG/D
METANEPH+NORMETANEPH UR-IMP: NORMAL
METANEPH/CREAT 24H UR: 127 UG/G CRT
NORMETANEPHRINE 24H UR-MCNC: 399 UG/L
NORMETANEPHRINE 24H UR-MRATE: NORMAL UG/D
NORMETANEPHRINE/CREAT 24H UR: 220 UG/G CRT
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
## 2023-04-24 ENCOUNTER — NURSE TRIAGE (OUTPATIENT)
Dept: NURSING | Facility: CLINIC | Age: 38
End: 2023-04-24
Payer: MEDICAID

## 2023-04-24 NOTE — RESULT ENCOUNTER NOTE
Brian,  Your results are normal.  Please let me know if you have any questions.    Sincerely,  Dr. Joseph  error

## 2023-04-25 NOTE — TELEPHONE ENCOUNTER
Triage Call:    Patient calling to reschedule appointment with cardiologist.  Informed patient that scheduling is closed for the day, but he will be able to reschedule by calling back in the morning.    No additional concerns or questions.    Tami Camacho RN  04/24/23 7:25 PM  [facility] Nurse Advisor    Reason for Disposition    General information question, no triage required and triager able to answer question    Additional Information    Negative: [1] Caller is not with the adult (patient) AND [2] reporting urgent symptoms    Negative: Lab result questions    Negative: Medication questions    Negative: Caller can't be reached by phone    Negative: Caller has already spoken to PCP or another triager    Negative: RN needs further essential information from caller in order to complete triage    Negative: Requesting regular office appointment    Negative: [1] Caller requesting NON-URGENT health information AND [2] PCP's office is the best resource    Negative: Health Information question, no triage required and triager able to answer question    Protocols used: INFORMATION ONLY CALL - NO TRIAGE-A-

## 2023-05-01 ENCOUNTER — HOSPITAL ENCOUNTER (OUTPATIENT)
Dept: CARDIOLOGY | Facility: CLINIC | Age: 38
Discharge: HOME | End: 2023-05-01
Attending: FAMILY MEDICINE | Admitting: FAMILY MEDICINE
Payer: MEDICAID

## 2023-05-01 PROCEDURE — 93242 EXT ECG>48HR<7D RECORDING: CPT

## 2023-05-18 DIAGNOSIS — E78.5 HYPERLIPIDEMIA LDL GOAL <130: ICD-10-CM

## 2023-05-18 DIAGNOSIS — E03.8 OTHER SPECIFIED HYPOTHYROIDISM: ICD-10-CM

## 2023-05-19 RX ORDER — LEVOTHYROXINE SODIUM 50 UG/1
TABLET ORAL
Qty: 30 TABLET | Refills: 8 | Status: SHIPPED | OUTPATIENT
Start: 2023-05-19 | End: 2024-02-23

## 2023-05-19 RX ORDER — ATORVASTATIN CALCIUM 20 MG/1
TABLET, FILM COATED ORAL
Qty: 30 TABLET | Refills: 8 | Status: SHIPPED | OUTPATIENT
Start: 2023-05-19 | End: 2024-02-23

## 2023-05-19 NOTE — TELEPHONE ENCOUNTER
"Levothyroxine  Lipitor  Prescription approved per [facility] Refill Protocol.    Alysa Saleem RN    Requested Prescriptions   Pending Prescriptions Disp Refills     levothyroxine (SYNTHROID/LEVOTHROID) 50 MCG tablet [Pharmacy Med Name: Levothyroxine Sodium 50MCG TABS] 30 tablet      Sig: TAKE 1 TABLET BY MOUTH DAILY       Thyroid Protocol Passed - 5/18/2023  2:00 PM        Passed - Patient is 12 years or older        Passed - Recent (12 mo) or future (30 days) visit within the authorizing provider's specialty     Patient has had an office visit with the authorizing provider or a provider within the authorizing providers department within the previous 12 mos or has a future within next 30 days. See \"Patient Info\" tab in inbasket, or \"Choose Columns\" in Meds & Orders section of the refill encounter.              Passed - Medication is active on med list        Passed - Normal TSH on file in past 12 months     Recent Labs   Lab Test 09/15/22  0836   TSH 2.63                 atorvastatin (LIPITOR) 20 MG tablet [Pharmacy Med Name: Atorvastatin Calcium 20MG TABS] 30 tablet      Sig: TAKE 1 TABLET BY MOUTH EVERY NIGHT AT BEDTIME       Statins Protocol Passed - 5/18/2023  2:00 PM        Passed - LDL on file in past 12 months     Recent Labs   Lab Test 09/15/22  0836   LDL 60             Passed - No abnormal creatine kinase in past 12 months     No lab results found.             Passed - Recent (12 mo) or future (30 days) visit within the authorizing provider's specialty     Patient has had an office visit with the authorizing provider or a provider within the authorizing providers department within the previous 12 mos or has a future within next 30 days. See \"Patient Info\" tab in inbasket, or \"Choose Columns\" in Meds & Orders section of the refill encounter.              Passed - Medication is active on med list        Passed - Patient is age 18 or older             "

## 2023-06-17 ENCOUNTER — NURSE TRIAGE (OUTPATIENT)
Dept: FAMILY MEDICINE | Facility: CLINIC | Age: 38
End: 2023-06-17
Payer: MEDICAID

## 2023-06-17 NOTE — TELEPHONE ENCOUNTER
Reason for Call:  Appointment Request    Patient requesting this type of appt:  office    Requested provider: Olu Joseph    Reason patient unable to be scheduled: Not within requested timeframe    When does patient want to be seen/preferred time: 3-7 days    Comments: Brian is seeing red flashing for a couple times and when eye gets watery they also get very itchy. Needs to rub eye with paper towel    Could we send this information to you in [facility] or would you prefer to receive a phone call?:   Patient would prefer a phone call   Okay to leave a detailed message?: Yes at Work number on file:  There is no work phone number on file.    Call taken on 6/17/2023 at 4:49 PM by Fatoumata Zepeda

## 2023-06-19 NOTE — TELEPHONE ENCOUNTER
Patient has flashing in both eyes 2 times every other day for the last 3 months.    No pain in the eye.  He has squishing and popping sensations in his face.    Patient advised to see an eye doctor. He states he does not have a ride.    Cara Ayala RN on 6/19/2023 at 3:50 PM      Reason for Disposition    Flashes of light  (Exception: brief from pressing on the eyeball)    Additional Information    Negative: Weakness of the face, arm or leg on one side of the body    Negative: Followed getting substance in the eye    Negative: Foreign body or object is or was lodged in the eye    Negative: Followed an eye injury    Negative: Followed sun lamp or sun exposure (UV keratitis)    Negative: Yellow or green discharge (pus) in the eye    Negative: Pregnant    Negative: Complete loss of vision in 1 or both eyes    Negative: SEVERE eye pain    Negative: Severe headache    Negative: Double vision    Negative: Blurred vision or visual changes and present now and sudden onset or new (e.g., minutes, hours, days)  (Exception: Seeing floaters / black specks OR previously diagnosed migraine headaches with same symptoms.)    Negative: Patient sounds very sick or weak to the triager    Protocols used: VISION LOSS OR CHANGE-A-OH

## 2023-06-19 NOTE — TELEPHONE ENCOUNTER
RN Triage    Patient Contact    Attempt # 1    Was call answered?  No.  Left message on voicemail with information to call me back.    Cara Ayala RN on 6/19/2023 at 11:07 AM

## 2023-08-04 ENCOUNTER — NURSE TRIAGE (OUTPATIENT)
Dept: NURSING | Facility: CLINIC | Age: 38
End: 2023-08-04
Payer: MEDICAID

## 2023-08-04 NOTE — TELEPHONE ENCOUNTER
Nurse Triage SBAR    Is this a 2nd Level Triage? NO    Situation:  abdominal pain    Background:  patient is having intermittent lower abdominal pain that he describes as stabbing and rates it at a 3/10 with diarrhea.  Patient has had 2 or 3 episodes of diarrhea since morning that he blames on the [employer] he ate last night.  Patient has had no vomiting and states that his temperature is currently 96.9 oral.    Assessment:  diarrhea    Protocol Recommended Disposition:   See PCP Within 24 Hours    Recommendation:  patient verbalized understanding of care advice        Does the patient meet one of the following criteria for ADS visit consideration? No      Reason for Disposition   Abdominal pain  (Exception: Pain clears with each passage of diarrhea stool)    Additional Information   Negative: Shock suspected (e.g., cold/pale/clammy skin, too weak to stand, low BP, rapid pulse)   Negative: Difficult to awaken or acting confused (e.g., disoriented, slurred speech)   Negative: Sounds like a life-threatening emergency to the triager   Negative: [1] SEVERE abdominal pain (e.g., excruciating) AND [2] present > 1 hour   Negative: [1] SEVERE abdominal pain AND [2] age > 60 years   Negative: [1] Blood in the stool AND [2] moderate or large amount of blood   Negative: Black or tarry bowel movements (Exception: chronic-unchanged black-grey bowel movements AND is taking iron pills or Pepto-bismol)   Negative: [1] Drinking very little AND [2] dehydration suspected (e.g., no urine > 12 hours, very dry mouth, very lightheaded)   Negative: Patient sounds very sick or weak to the triager   Negative: [1] SEVERE diarrhea (e.g., 7 or more times / day more than normal) AND [2] age > 60 years   Negative: [1] Constant abdominal pain AND [2] present > 2 hours   Negative: [1] Fever > 103 F (39.4 C) AND [2] not able to get the fever down using Fever Care Advice   Negative: [1] SEVERE diarrhea (e.g., 7 or more times / day more than normal)  AND [2] present > 24 hours (1 day)   Negative: [1] MODERATE diarrhea (e.g., 4-6 times / day more than normal) AND [2] present > 48 hours (2 days)   Negative: [1] MODERATE diarrhea (e.g., 4-6 times / day more than normal) AND [2] age > 70 years   Negative: Fever > 101 F (38.3 C)   Negative: Fever present > 3 days (72 hours)    Protocols used: Diarrhea-A-AH

## 2023-08-16 ENCOUNTER — PATIENT OUTREACH (OUTPATIENT)
Dept: CARE COORDINATION | Facility: CLINIC | Age: 38
End: 2023-08-16
Payer: MEDICAID

## 2023-08-30 ENCOUNTER — PATIENT OUTREACH (OUTPATIENT)
Dept: CARE COORDINATION | Facility: CLINIC | Age: 38
End: 2023-08-30
Payer: MEDICAID

## 2023-08-31 ENCOUNTER — VIRTUAL VISIT (OUTPATIENT)
Dept: CARDIOLOGY | Facility: CLINIC | Age: 38
End: 2023-08-31
Payer: MEDICAID

## 2023-08-31 VITALS — BODY MASS INDEX: 32.49 KG/M2 | HEIGHT: 63 IN

## 2023-08-31 DIAGNOSIS — R06.02 SHORTNESS OF BREATH: Primary | ICD-10-CM

## 2023-08-31 PROCEDURE — 99203 OFFICE O/P NEW LOW 30 MIN: CPT | Mod: VID | Performed by: INTERNAL MEDICINE

## 2023-08-31 NOTE — PROGRESS NOTES
Brian is a 37 year old who is being evaluated via a billable video visit.      How would you like to obtain your AVS? MyChart  If the video visit is dropped, the invitation should be resent by: Text to cell phone: [phone]  Will anyone else be joining your video visit? No        PROVIDER NOTES:     This is a 37 year old with PMH schizophrenia here for shortness of breath. He also reports occasional chest pain. He had imaging done over a year ago that was unremarkable. He has a history of HTN and HLD. History today somewhat limited.     ASSESSMENT/PLAN:    37 yr old with HTN, HLD, schizophrenia. Here for SOB evaluation. Will initiate work up for SOB with echocardiogram and CT pulmonary embolism scan. Had recent ischemic evaluation last year so not needing to repeat. Will plan for one month follow up with BRAD to review.    Total time spent 25 minutes.    Magaly Currie MD MSC    General:  no apparent distress, normal body habitus, sitting upright.  ENT/Mouth:  membranes moist, no nasal discharge.  Normal head shape, no apparent injury or laceration.  Eyes:  no scleral icterus, normal conjunctivae.  No observed jaundice.  Neck:  no apparent neck swelling.   Chest/Lungs:  No breathing difficulty while speaking.  No audible wheezing.  No cough during conversation.  Cardiovascular:  No obviously elevated jugular venous pressure.  No apparent edema bilaterally in LE.   Abdomen:  no obvious abdominal distention.   Extremities:  no apparent cyanosis.  Skin:  no xanthelasma.  No facial lacerations.  Neurologic:  Normal arm motion bilateral, no tremors.    Psychiatric:  Alert and oriented x3, calm demeanor    Current Outpatient Medications   Medication    atorvastatin (LIPITOR) 20 MG tablet    FANAPT 12 MG TABS    levothyroxine (SYNTHROID/LEVOTHROID) 50 MCG tablet    pantoprazole (PROTONIX) 40 MG EC tablet    sertraline (ZOLOFT) 100 MG tablet     No current facility-administered medications for this visit.             Video-Visit Details    Type of service:  Video Visit   VIDEO TIME: 2:00-2:30 PM    Originating Location (pt. Location): Home    Distant Location (provider location):  On-site  Platform used for Video Visit: Wilberto

## 2023-08-31 NOTE — LETTER
8/31/2023    Olu Joseph MD  [address] [place] [address]    RE: Brian Ortiz       Dear Colleague,     I had the pleasure of seeing Brian Ortiz in the [facility].        Brian is a 37 year old who is being evaluated via a billable video visit.      How would you like to obtain your AVS? MyChart  If the video visit is dropped, the invitation should be resent by: Text to cell phone: [phone]  Will anyone else be joining your video visit? No        PROVIDER NOTES:     This is a 37 year old with PMH schizophrenia here for shortness of breath. He also reports occasional chest pain. He had imaging done over a year ago that was unremarkable. He has a history of HTN and HLD. History today somewhat limited.     ASSESSMENT/PLAN:    37 yr old with HTN, HLD, schizophrenia. Here for SOB evaluation. Will initiate work up for SOB with echocardiogram and CT pulmonary embolism scan. Had recent ischemic evaluation last year so not needing to repeat. Will plan for one month follow up with BRAD to review.    Total time spent 25 minutes.    Magaly Currie MD MSC    General:  no apparent distress, normal body habitus, sitting upright.  ENT/Mouth:  membranes moist, no nasal discharge.  Normal head shape, no apparent injury or laceration.  Eyes:  no scleral icterus, normal conjunctivae.  No observed jaundice.  Neck:  no apparent neck swelling.   Chest/Lungs:  No breathing difficulty while speaking.  No audible wheezing.  No cough during conversation.  Cardiovascular:  No obviously elevated jugular venous pressure.  No apparent edema bilaterally in LE.   Abdomen:  no obvious abdominal distention.   Extremities:  no apparent cyanosis.  Skin:  no xanthelasma.  No facial lacerations.  Neurologic:  Normal arm motion bilateral, no tremors.    Psychiatric:  Alert and oriented x3, calm demeanor    Current Outpatient Medications   Medication    atorvastatin (LIPITOR) 20 MG tablet    FANAPT 12  MG TABS    levothyroxine (SYNTHROID/LEVOTHROID) 50 MCG tablet    pantoprazole (PROTONIX) 40 MG EC tablet    sertraline (ZOLOFT) 100 MG tablet     No current facility-administered medications for this visit.            Video-Visit Details    Type of service:  Video Visit   VIDEO TIME: 2:00-2:30 PM    Originating Location (pt. Location): Home    Distant Location (provider location):  On-site  Platform used for Video Visit: Wilberto     Thank you for allowing me to participate in the care of your patient.      Sincerely,     Magaly Currie MD     [facility]  cc:   No referring provider defined for this encounter.

## 2023-09-02 ENCOUNTER — NURSE TRIAGE (OUTPATIENT)
Dept: NURSING | Facility: CLINIC | Age: 38
End: 2023-09-02
Payer: MEDICAID

## 2023-09-02 NOTE — TELEPHONE ENCOUNTER
"\"I am losing my brains. It is harder for me to speak and remember things. Slowly getting worse the past couple of years. I live alone. I am able to function OK for the most part. Forgetting little things: turn light off, leaving fridge open.  Last seen by Dr several months ago. His problem was not as bad then, didn't discuss this with the Dr.   He states his speech is harder to form coherent sentences. He can be clearly understood on the phone.   He states he has been depressed and anxious his whole life. Sees a Dr for this. On medication for this.\" Keeps hallucinations at bay. Getting worse, effectiveness of hallucination medication is fading. Hallucinations at bedtime lasting while drifting in and out of sleep.\"    [facility]: provider Olga Chan.   [facility] : provider Song Gracia    [place] [facility]: Dr Olu Joseph,    Triaged to a disposition of Go to ED now or PCP triage.  Dr Alannah Onofre on call, paged via am com @ 6:07pm. If hallucinations are new and sudden problem, then he needs to go to the ER. Otherwise he should contact his provider on Tuesday (Psychiatrist).   6:14pm Contacted patient: hallucinations have been occurring for the last year or 2. Advised patient to contact his psychiatrist on Tuesday, also discuss speech and memory issues with provider at that time.     Jaymie Dunaway RN Triage Nurse Advisor 6:17 PM 9/2/2023    Reason for Disposition   [1] Seeing, hearing, or feeling things that are not there (i.e., visual, auditory, or tactile hallucinations) AND [2] new or worsening    Additional Information   Negative: [1] Difficult to awaken or acting confused (e.g., disoriented, slurred speech) AND [2] present now AND [3] new-onset   Negative: [1] Weakness of the face, arm, or leg on one side of the body AND [2] new-onset   Negative: [1] Numbness of the face, arm, or leg on one side of the body AND [2] new-onset   Negative: [1] Loss " of speech or garbled speech AND [2] new-onset   Negative: Shock suspected (e.g., cold/pale/clammy skin, too weak to stand, low BP, rapid pulse)   Negative: Sounds like a life-threatening emergency to the triager   Negative: [1] New-onset confusion AND [2] no prior diagnosis of dementia   Negative: Swallowing problems   Negative: Weakness or fatigue is main concern   Negative: Anxiety or panic attack is main concern   Negative: Depression is main concern   Negative: Severe agitation or behavior problem (e.g., patient endangering self or others)   Negative: [1] Worsening confusion AND [2] new-onset (hours to 3 days)   Negative: [1] Difficult to awaken or acting confused (e.g., disoriented, slurred speech) AND [2] present now AND [3] new-onset AND [4] has diabetes (diabetes mellitus)    Protocols used: Dementia Symptoms and [id number]

## 2023-09-06 DIAGNOSIS — K21.00 GASTROESOPHAGEAL REFLUX DISEASE WITH ESOPHAGITIS, UNSPECIFIED WHETHER HEMORRHAGE: ICD-10-CM

## 2023-09-07 RX ORDER — PANTOPRAZOLE SODIUM 40 MG/1
40 TABLET, DELAYED RELEASE ORAL DAILY
Qty: 30 TABLET | Refills: 5 | Status: SHIPPED | OUTPATIENT
Start: 2023-09-07 | End: 2024-03-22

## 2023-09-12 ENCOUNTER — MYC MEDICAL ADVICE (OUTPATIENT)
Dept: CARDIOLOGY | Facility: CLINIC | Age: 38
End: 2023-09-12
Payer: MEDICAID

## 2023-09-13 ENCOUNTER — HOSPITAL ENCOUNTER (OUTPATIENT)
Dept: CT IMAGING | Facility: CLINIC | Age: 38
Discharge: HOME | End: 2023-09-13
Attending: INTERNAL MEDICINE | Admitting: INTERNAL MEDICINE
Payer: MEDICAID

## 2023-09-13 DIAGNOSIS — R06.02 SHORTNESS OF BREATH: ICD-10-CM

## 2023-09-13 LAB
CREAT BLD-MCNC: 1.1 MG/DL (ref 0.7–1.3)
EGFRCR SERPLBLD CKD-EPI 2021: >60 ML/MIN/1.73M2

## 2023-09-13 PROCEDURE — 250N000011 HC RX IP 250 OP 636: Performed by: INTERNAL MEDICINE

## 2023-09-13 PROCEDURE — 250N000009 HC RX 250: Performed by: INTERNAL MEDICINE

## 2023-09-13 PROCEDURE — 71275 CT ANGIOGRAPHY CHEST: CPT

## 2023-09-13 PROCEDURE — 82565 ASSAY OF CREATININE: CPT

## 2023-09-13 RX ORDER — IOPAMIDOL 755 MG/ML
500 INJECTION, SOLUTION INTRAVASCULAR ONCE
Status: COMPLETED | OUTPATIENT
Start: 2023-09-13 | End: 2023-09-13

## 2023-09-13 RX ADMIN — SODIUM CHLORIDE 70 ML: 9 INJECTION, SOLUTION INTRAVENOUS at 15:50

## 2023-09-13 RX ADMIN — IOPAMIDOL 70 ML: 755 INJECTION, SOLUTION INTRAVENOUS at 15:51

## 2023-09-14 ENCOUNTER — TELEPHONE (OUTPATIENT)
Dept: CARDIOLOGY | Facility: CLINIC | Age: 38
End: 2023-09-14
Payer: MEDICAID

## 2023-09-14 NOTE — TELEPHONE ENCOUNTER
RN updated patient via [employer].         No further work up needed prior to OPV. Thanks.    Dr. Currie

## 2023-09-14 NOTE — TELEPHONE ENCOUNTER
Results noted. No PE. Trace right sided pleural fluid. Patient schedule for follow up with BRAD Arita in October. RN will confirm with Dr. Currie if any further recommendations prior to F/U apt based on test results.             9/13/23 CT PE  IMPRESSION:  1.  No pulmonary artery embolism.  2.  Trace right-sided pleural fluid versus pleural thickening, similar  to comparison.  3.  Atelectasis and/or mild pulmonary edema.     MAL TRAN MD

## 2023-10-01 ENCOUNTER — NURSE TRIAGE (OUTPATIENT)
Dept: NURSING | Facility: CLINIC | Age: 38
End: 2023-10-01
Payer: MEDICAID

## 2023-10-01 NOTE — TELEPHONE ENCOUNTER
"Nurse Triage SBAR    Is this a 2nd Level Triage? YES, LICENSED PRACTITIONER REVIEW IS REQUIRED    Situation: Pt calling with concerns for jaw misalignment.    Background: Pt states his jaw is \"off to the side\" when he opens his mouth for the last half hour. He has reconstruction sx on his jaw in 2004.    Assessment: Pt states no injury has occurred to his jaw. This happened randomly. Whenever he opens his mouth, there is a 'popping sensation'. Jaw does go back into place when his mouth is closed. His jaw is sore. Denies swelling, redness, and fever.     Protocol Recommended Disposition:   Go to ED Now (Or PCP Triage)    Recommendation: Dispo for 2LT. Dr. Joseph who was on call spoken to who stated if pt is having severe pain and/or cannot eat, he is to go to the ER. Otherwise, he can call the clinic tomorrow to have them set up an appointment to be seen. This information was relayed to pt who stated understanding.      Reason for Disposition   [1] New-onset jaw pain AND [2] unknown cause AND [3] at least one cardiac risk factor (e.g., 45 years or older, diabetes, high cholesterol, hypertension, obesity, smoker or strong family history of heart disease)    Additional Information   Negative: Shock suspected (e.g., cold/pale/clammy skin, too weak to stand, low BP, rapid pulse)   Negative: [1] Similar pain previously AND [2] it was from \"heart attack\"   Negative: [1] Similar pain previously AND [2] it was from \"angina\" AND [3] not relieved by nitroglycerin   Negative: Sounds like a life-threatening emergency to the triager   Negative: Difficulty breathing or unusual sweating (e.g., sweating without exertion)   Negative: Can't close the mouth fully (i.e., \"mouth is locked open\", patient will have difficulty talking)   Negative: [1] Fever AND [2] localized red rash   Negative: [1] Fever AND [2] area of face is swollen   Negative: Patient sounds very sick or weak to the triager    Protocols used: Face Pain-A-AH    Gina " BRENDAN Fernandes RN  [facility] Nurse Advisor   10/1/2023  3:11 PM

## 2023-10-02 ENCOUNTER — NURSE TRIAGE (OUTPATIENT)
Dept: FAMILY MEDICINE | Facility: CLINIC | Age: 38
End: 2023-10-02
Payer: MEDICAID

## 2023-10-02 NOTE — TELEPHONE ENCOUNTER
I called and talked to mom and she is not concerned.  She will contact us if she is woried about his jaw.    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    [employer] message sent by me to patient per mom's request.    Olu Joseph MD

## 2023-10-02 NOTE — TELEPHONE ENCOUNTER
Reason for Call:  Appointment Request    Patient requesting this type of appt:  office visit    Requested provider: Olu Joseph    Reason patient unable to be scheduled: Not within requested timeframe    When does patient want to be seen/preferred time: 1-2 days    Comments: Patient thinks he might have a dislocated jaw    Could we send this information to you in [facility] or would you prefer to receive a phone call?:   Patient would prefer a phone call   Okay to leave a detailed message?: Yes at Cell number on file:    Telephone Information:   Mobile [phone]       Call taken on 10/2/2023 at 8:09 AM by Guera Jalloh

## 2023-10-02 NOTE — TELEPHONE ENCOUNTER
Reason for Disposition    Patient sounds very sick or weak to the triager    Additional Information    Negative: New-onset jaw pain of unknown cause AND at least one cardiac risk factor (e.g., 45 years or older, diabetes, high cholesterol, hypertension, obesity, smoker or strong family history of heart disease)    Protocols used: Face Pain-A-OH

## 2023-10-02 NOTE — TELEPHONE ENCOUNTER
Mom calling back. However, RN could not find what chart this was in until after I advised mom I would call back.     Will have clinic call patient/mom back.     KATIE RichN, RN

## 2023-10-02 NOTE — TELEPHONE ENCOUNTER
Message handled by Nurse Triage with Huddle - provider name: Jake .    Please call mother to discuss patient's symptoms.    Left message at home number to call back.    Cara Ayala RN on 10/2/2023 at 9:07 AM

## 2023-10-02 NOTE — TELEPHONE ENCOUNTER
"Patient felt a strange muscle movement when he opens his mouth.  He states now his chin is off to the side when he opens his mouth all the way. This started last night.    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    He has tingling in the right side of his face.  He also has pain in that side.  He has no swelling.  No weakness in his arms or legs.    Per protocol RN to huddle with provider for advice.    Cara Ayala RN on 10/2/2023 at 8:51 AM      Additional Information   Negative: Shock suspected (e.g., cold/pale/clammy skin, too weak to stand, low BP, rapid pulse)   Negative: Similar pain previously and it was from 'heart attack'   Negative: Similar pain previously and it was from 'angina' and not relieved by nitroglycerin   Negative: Sounds like a life-threatening emergency to the triager   Negative: Chest pain   Negative: Sinus pain and congestion   Negative: Headache or pain in upper forehead   Negative: Toothache is main symptom   Negative: Followed a face injury   Negative: Difficulty breathing or unusual sweating (e.g., sweating without exertion)   Negative: Can't close the mouth fully (i.e., \"mouth is locked open\", patient will have difficulty talking)   Negative: Fever and localized red rash   Negative: Fever and area of face is swollen    Protocols used: Face Pain-A-OH    "

## 2023-10-28 ENCOUNTER — HEALTH MAINTENANCE LETTER (OUTPATIENT)
Age: 38
End: 2023-10-28

## 2024-01-03 ENCOUNTER — HOSPITAL ENCOUNTER (OUTPATIENT)
Dept: CARDIOLOGY | Facility: CLINIC | Age: 39
Discharge: HOME | End: 2024-01-03
Attending: INTERNAL MEDICINE | Admitting: INTERNAL MEDICINE
Payer: MEDICAID

## 2024-01-03 DIAGNOSIS — R06.02 SHORTNESS OF BREATH: ICD-10-CM

## 2024-01-03 LAB — LVEF ECHO: NORMAL

## 2024-01-03 PROCEDURE — 93306 TTE W/DOPPLER COMPLETE: CPT | Mod: 26 | Performed by: INTERNAL MEDICINE

## 2024-01-03 PROCEDURE — 93306 TTE W/DOPPLER COMPLETE: CPT

## 2024-01-04 ENCOUNTER — OFFICE VISIT (OUTPATIENT)
Dept: CARDIOLOGY | Facility: CLINIC | Age: 39
End: 2024-01-04
Payer: MEDICAID

## 2024-01-04 VITALS
BODY MASS INDEX: 33.04 KG/M2 | SYSTOLIC BLOOD PRESSURE: 106 MMHG | HEART RATE: 88 BPM | OXYGEN SATURATION: 97 % | DIASTOLIC BLOOD PRESSURE: 68 MMHG | RESPIRATION RATE: 20 BRPM | HEIGHT: 63 IN | WEIGHT: 186.5 LBS

## 2024-01-04 DIAGNOSIS — R06.02 SHORTNESS OF BREATH: ICD-10-CM

## 2024-01-04 PROCEDURE — 99212 OFFICE O/P EST SF 10 MIN: CPT | Performed by: NURSE PRACTITIONER

## 2024-01-04 RX ORDER — ILOPERIDONE 2 MG/1
2 TABLET ORAL
COMMUNITY
Start: 2023-12-14

## 2024-01-04 RX ORDER — ILOPERIDONE 6 MG/1
6 TABLET ORAL
COMMUNITY
Start: 2023-12-15

## 2024-01-04 RX ORDER — HYDROXYZINE HYDROCHLORIDE 25 MG/1
25 TABLET, FILM COATED ORAL
COMMUNITY
Start: 2023-12-27

## 2024-01-04 ASSESSMENT — PAIN SCALES - GENERAL: PAINLEVEL: NO PAIN (0)

## 2024-01-04 NOTE — PATIENT INSTRUCTIONS
TODAY'S RECOMMENDATIONS:    Heart ultrasound showed normal heart structure, function, size. All heart valves were normal and not evidence of holes.  Heart monitor showed rate extra beats that are not harmful or dangerous. If you feel palpitations it is likely from these extra beats.  Heart stress test was also normal  Please follow up with cardiology as needed, but shortness of breath is likely due to an issue other than your heart.    If you have questions or concerns please call clinic at [phone].    Please call [phone] for scheduling.      It was a pleasure seeing you today!

## 2024-01-04 NOTE — LETTER
1/4/2024    Olu Joseph MD  [address] [place] [address]    RE: Brian Ortiz       Dear Colleague,     I had the pleasure of seeing Brian Ortiz in the [facility].    General Cardiology Clinic Progress Note  Brian Ortiz MRN# [id number]   YOB: 1985 Age: 38 year old     Primary cardiologist: Dr. Currie    Reason for visit: Follow-up cardiac testing    History of presenting illness:    Brian Ortiz is a pleasant 38 year old patient with past medical history significant for:    Schizophrenia  Hypertension  Hyperlipidemia    The patient establish care with Dr. Currie in 8/2023 for shortness of breath via a video visit.  It was recommended that he undergo an echocardiogram and a CT PE and follow-up with [facility] to review.  Prior to their office visit the patient had had a stress test that was negative for ischemia or infarction.     Today the patient returns to discuss his echocardiogram.  We discussed that the echo showed no wall motion or valvular abnormalities as well normal function, structure and size.  We also discussed his previous Zio patch monitor showed rare PVCs and if he would feel palpitations moving forward these were likely the culprit and that they are extra benign skipped beats.    He states that his shortness of breath is unchanged and has been for his whole life.  Previously, he had chest discomfort on a higher dose of the FANAPT but with decreasing the dose his symptoms have resolved.    Diagnotic studies:  Nuclear stress test (12/2022): Negative for inducible myocardial ischemia or infarction  CT PE (9/2023): No PE  Echocardiogram (1/3/2023): LVEF greater than 70% with normal structure, function and size.  Zio Patch (5/1-5/4/2023): Rare PVCs and symptoms did not correlate with arrhythmias          Assessment and Plan:     ASSESSMENT:    Shortness of breath   No cardiopulmonary etiology for symptoms  Reviewed the above studies with  "the patient    PLAN:     Follow up with cardiology as needed     Orders this Visit:  No orders of the defined types were placed in this encounter.    Orders Placed This Encounter   Medications    hydrOXYzine HCl (ATARAX) 25 MG tablet     Sig: Take 25 mg by mouth    FANAPT 2 MG TABS tablet     Sig: Take 2 mg by mouth    FANAPT 6 MG TABS tablet     Sig: Take 6 mg by mouth At night     Medications Discontinued During This Encounter   Medication Reason    FANAPT 12 MG TABS Stopped by Patient (No AVS)       Today's clinic visit entailed:  Review of the result(s) of each unique test - Echo, nuc, Zio, CT PE  15 minutes spent by me on the date of the encounter doing chart review, history and exam, documentation and further activities per the note  Provider  Link to [facility] Help Grid     The level of medical decision making during this visit was of moderate complexity.           Review of Systems:     Review of Systems:  Skin:        Eyes:  Positive for glasses  ENT:  Positive for hearing loss  Respiratory:  Positive for wheezing;dyspnea on exertion  Cardiovascular:  Negative;edema;lightheadedness;dizziness;syncope or near-syncope Positive for;palpitations;chest pain  Gastroenterology:      Genitourinary:       Musculoskeletal:       Neurologic:  Positive for numbness or tingling of feet  Psychiatric:       Heme/Lymph/Imm:  Positive for allergies  Endocrine:                 Physical Exam:     Vitals: /68 (BP Location: Right arm, Patient Position: Sitting, Cuff Size: Adult Regular)   Pulse 88   Resp 20   Ht 1.602 m (5' 3.07\")   Wt 84.6 kg (186 lb 8 oz)   SpO2 97%   BMI 32.96 kg/m    Constitutional: Well nourished and in no apparent distress.  Eyes: Pupils equal, round. Sclerae anicteric.   HEENT: Normocephalic, atraumatic.   Neck: Supple. JVD  Respiratory: Breathing non-labored. Lungs clear to auscultation bilaterally. No crackles, wheezes, rhonchi, or rales.  Cardiovascular:  Regular rate and rhythm, normal S1 and " S2. No murmur, rub, or gallop.  Skin: Warm, dry. No rashes, cyanosis, or xanthelasma.  Extremities: No edema.  Neurologic: No gross motor deficits. Alert, awake, and oriented to person, place and time.  Psychiatric: Affect appropriate.        CURRENT MEDICATIONS:  Current Outpatient Medications   Medication Sig Dispense Refill    atorvastatin (LIPITOR) 20 MG tablet TAKE 1 TABLET BY MOUTH EVERY NIGHT AT BEDTIME 30 tablet 8    FANAPT 2 MG TABS tablet Take 2 mg by mouth      FANAPT 6 MG TABS tablet Take 6 mg by mouth At night      hydrOXYzine HCl (ATARAX) 25 MG tablet Take 25 mg by mouth      levothyroxine (SYNTHROID/LEVOTHROID) 50 MCG tablet TAKE 1 TABLET BY MOUTH DAILY 30 tablet 8    pantoprazole (PROTONIX) 40 MG EC tablet TAKE 1 TABLET BY MOUTH DAILY 30 tablet 5    sertraline (ZOLOFT) 100 MG tablet Take 150 mg by mouth daily         ALLERGIES  Allergies   Allergen Reactions    No Known Drug Allergy     Zyprexa [Olanzapine]          PAST MEDICAL HISTORY:  Past Medical History:   Diagnosis Date    Attention deficit disorder with hyperactivity(314.01)     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 08/13/2004    Other specified congenital anomaly of kidney     congenital single kidney    Scoliosis associated with other condition        PAST SURGICAL HISTORY:  Past Surgical History:   Procedure Laterality Date    SURGICAL HISTORY OF -   08/10/2004    Madibular midline ostectom;y with anterior ilac crest bone graft.  Bilateral sagittal split ramus osteotomy.  [facility]    ZZHC CREATE EARDRUM OPENING,GEN ANESTH      P.E. Tubes       FAMILY HISTORY:  Family History   Adopted: Yes   Problem Relation Age of Onset    Substance Abuse Mother     Substance Abuse Father        SOCIAL HISTORY:  Social History     Socioeconomic History    Marital status: Single      Spouse name: None    Number of children: None    Years of education: None    Highest education level: None   Tobacco Use    Smoking status: Never    Smokeless tobacco: Never   Vaping Use    Vaping Use: Never used   Substance and Sexual Activity    Alcohol use: No     Alcohol/week: 0.0 standard drinks of alcohol    Drug use: No    Sexual activity: Never               Thank you for allowing me to participate in the care of your patient.      Sincerely,     DANA Tejeda [facility]  cc:   Magaly Currie MD  [address] [place] [id number]

## 2024-01-04 NOTE — PROGRESS NOTES
General Cardiology Clinic Progress Note  Brian Ortiz MRN# [id number]   YOB: 1985 Age: 38 year old     Primary cardiologist: Dr. Currie    Reason for visit: Follow-up cardiac testing    History of presenting illness:    Brian Ortiz is a pleasant 38 year old patient with past medical history significant for:    Schizophrenia  Hypertension  Hyperlipidemia    The patient establish care with Dr. Currie in 8/2023 for shortness of breath via a video visit.  It was recommended that he undergo an echocardiogram and a CT PE and follow-up with [facility] to review.  Prior to their office visit the patient had had a stress test that was negative for ischemia or infarction.     Today the patient returns to discuss his echocardiogram.  We discussed that the echo showed no wall motion or valvular abnormalities as well normal function, structure and size.  We also discussed his previous Zio patch monitor showed rare PVCs and if he would feel palpitations moving forward these were likely the culprit and that they are extra benign skipped beats.    He states that his shortness of breath is unchanged and has been for his whole life.  Previously, he had chest discomfort on a higher dose of the FANAPT but with decreasing the dose his symptoms have resolved.    Diagnotic studies:  Nuclear stress test (12/2022): Negative for inducible myocardial ischemia or infarction  CT PE (9/2023): No PE  Echocardiogram (1/3/2023): LVEF greater than 70% with normal structure, function and size.  Zio Patch (5/1-5/4/2023): Rare PVCs and symptoms did not correlate with arrhythmias          Assessment and Plan:     ASSESSMENT:    Shortness of breath   No cardiopulmonary etiology for symptoms  Reviewed the above studies with the patient    PLAN:     Follow up with cardiology as needed     Orders this Visit:  No orders of the defined types were placed in this encounter.    Orders Placed This Encounter   Medications    hydrOXYzine HCl  "(ATARAX) 25 MG tablet     Sig: Take 25 mg by mouth    FANAPT 2 MG TABS tablet     Sig: Take 2 mg by mouth    FANAPT 6 MG TABS tablet     Sig: Take 6 mg by mouth At night     Medications Discontinued During This Encounter   Medication Reason    FANAPT 12 MG TABS Stopped by Patient (No AVS)       Today's clinic visit entailed:  Review of the result(s) of each unique test - Echo, nuc, Zio, CT PE  15 minutes spent by me on the date of the encounter doing chart review, history and exam, documentation and further activities per the note  Provider  Link to [facility] Help Grid     The level of medical decision making during this visit was of moderate complexity.           Review of Systems:     Review of Systems:  Skin:        Eyes:  Positive for glasses  ENT:  Positive for hearing loss  Respiratory:  Positive for wheezing;dyspnea on exertion  Cardiovascular:  Negative;edema;lightheadedness;dizziness;syncope or near-syncope Positive for;palpitations;chest pain  Gastroenterology:      Genitourinary:       Musculoskeletal:       Neurologic:  Positive for numbness or tingling of feet  Psychiatric:       Heme/Lymph/Imm:  Positive for allergies  Endocrine:                 Physical Exam:     Vitals: /68 (BP Location: Right arm, Patient Position: Sitting, Cuff Size: Adult Regular)   Pulse 88   Resp 20   Ht 1.602 m (5' 3.07\")   Wt 84.6 kg (186 lb 8 oz)   SpO2 97%   BMI 32.96 kg/m    Constitutional: Well nourished and in no apparent distress.  Eyes: Pupils equal, round. Sclerae anicteric.   HEENT: Normocephalic, atraumatic.   Neck: Supple. JVD  Respiratory: Breathing non-labored. Lungs clear to auscultation bilaterally. No crackles, wheezes, rhonchi, or rales.  Cardiovascular:  Regular rate and rhythm, normal S1 and S2. No murmur, rub, or gallop.  Skin: Warm, dry. No rashes, cyanosis, or xanthelasma.  Extremities: No edema.  Neurologic: No gross motor deficits. Alert, awake, and oriented to person, place and " time.  Psychiatric: Affect appropriate.        CURRENT MEDICATIONS:  Current Outpatient Medications   Medication Sig Dispense Refill    atorvastatin (LIPITOR) 20 MG tablet TAKE 1 TABLET BY MOUTH EVERY NIGHT AT BEDTIME 30 tablet 8    FANAPT 2 MG TABS tablet Take 2 mg by mouth      FANAPT 6 MG TABS tablet Take 6 mg by mouth At night      hydrOXYzine HCl (ATARAX) 25 MG tablet Take 25 mg by mouth      levothyroxine (SYNTHROID/LEVOTHROID) 50 MCG tablet TAKE 1 TABLET BY MOUTH DAILY 30 tablet 8    pantoprazole (PROTONIX) 40 MG EC tablet TAKE 1 TABLET BY MOUTH DAILY 30 tablet 5    sertraline (ZOLOFT) 100 MG tablet Take 150 mg by mouth daily         ALLERGIES  Allergies   Allergen Reactions    No Known Drug Allergy     Zyprexa [Olanzapine]          PAST MEDICAL HISTORY:  Past Medical History:   Diagnosis Date    Attention deficit disorder with hyperactivity(314.01)     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 08/13/2004    Other specified congenital anomaly of kidney     congenital single kidney    Scoliosis associated with other condition        PAST SURGICAL HISTORY:  Past Surgical History:   Procedure Laterality Date    SURGICAL HISTORY OF -   08/10/2004    Madibular midline ostectom;y with anterior ilac crest bone graft.  Bilateral sagittal split ramus osteotomy.  [facility]    ZZHC CREATE EARDRUM OPENING,GEN ANESTH      P.E. Tubes       FAMILY HISTORY:  Family History   Adopted: Yes   Problem Relation Age of Onset    Substance Abuse Mother     Substance Abuse Father        SOCIAL HISTORY:  Social History     Socioeconomic History    Marital status: Single     Spouse name: None    Number of children: None    Years of education: None    Highest education level: None   Tobacco Use    Smoking status: Never    Smokeless tobacco: Never   Vaping Use    Vaping  Use: Never used   Substance and Sexual Activity    Alcohol use: No     Alcohol/week: 0.0 standard drinks of alcohol    Drug use: No    Sexual activity: Never

## 2024-02-21 ENCOUNTER — NURSE TRIAGE (OUTPATIENT)
Dept: FAMILY MEDICINE | Facility: CLINIC | Age: 39
End: 2024-02-21
Payer: MEDICAID

## 2024-02-21 NOTE — TELEPHONE ENCOUNTER
"Nurse Triage SBAR    Is this a 2nd Level Triage? YES, LICENSED PRACTITIONER REVIEW IS REQUIRED    Situation: Patient woke up feeling dizzy this morning and it has been there all day but is getting better as the day goes on.     Background: The day before patient had stayed up for 30 hours with little to eat or drink.     Assessment: No weakness or  numbness of face, arm or leg on one side of the body. No vision changes. Has not checked temperature but \"feels warm\".     No chest pain, no shortness of breath. Feels slightly lightheaded right now but has not eaten anything since breakfast this morning at 0800.      Protocol Recommended Disposition:   Go To Office Now    Recommendation: Patient cannot drive and has no one to take him to urgent care so would need to take an ambulance to ER to be evaluated. Would like advice of a provider.     Message handled by Nurse Triage with Huddle - provider name: Emi Collado .    Patient should get a good nights rest tonight, stay well hydrated and make sure he is eating. If dizziness is getting worse or more severe he should call triage back and if it doesn't resolve by tomorrow he should call clinic back.     Notified patient of providers advice and agreeable to plan.    Does the patient meet one of the following criteria for ADS visit consideration? No    Madiha Medina RN on 2/21/2024 at 3:48 PM      Reason for Disposition   Lightheadedness (dizziness) present now, after 2 hours of rest and fluids    Additional Information   Negative: SEVERE difficulty breathing (e.g., struggling for each breath, speaks in single words)   Negative: Shock suspected (e.g., cold/pale/clammy skin, too weak to stand, low BP, rapid pulse)   Negative: Difficult to awaken or acting confused (e.g., disoriented, slurred speech)   Negative: Fainted, and still feels dizzy afterwards   Negative: Overdose (accidental or intentional) of medications   Negative: New neurologic deficit that is present now: " * Weakness of the face, arm, or leg on one side of the body * Numbness of the face, arm, or leg on one side of the body * Loss of speech or garbled speech   Negative: Heart beating < 50 beats per minute OR > 140 beats per minute   Negative: Sounds like a life-threatening emergency to the triager   Negative: Chest pain   Negative: Rectal bleeding, bloody stool, or tarry-black stool   Negative: Vomiting is main symptom   Negative: Diarrhea is main symptom   Negative: Headache is main symptom   Negative: Heat exhaustion suspected (i.e., dehydration from heat exposure)   Negative: Patient states that they are having an anxiety or panic attack   Negative: Dizziness from low blood sugar (i.e., < 60 mg/dl or 3.5 mmol/l)   Negative: SEVERE dizziness (e.g., unable to stand, requires support to walk, feels like passing out now)   Negative: SEVERE headache or neck pain   Negative: Spinning or tilting sensation (vertigo) present now and one or more stroke risk factors (i.e., hypertension, diabetes mellitus, prior stroke/TIA, heart attack, age over 60) (Exception: Prior physician evaluation for this AND no different/worse than usual.)   Negative: Neurologic deficit that was brief (now gone), ANY of the following:* Weakness of the face, arm, or leg on one side of the body* Numbness of the face, arm, or leg on one side of the body* Loss of speech or garbled speech   Negative: Loss of vision or double vision  (Exception: Similar to previous migraines.)   Negative: Extra heartbeats, irregular heart beating, or heart is beating very fast (i.e., 'palpitations')   Negative: Difficulty breathing   Negative: Drinking very little and dehydration suspected (e.g., no urine > 12 hours, very dry mouth, very lightheaded)   Negative: Follows bleeding (e.g., stomach, rectum, vagina)  (Exception: Became dizzy from sight of small amount blood.)   Negative: Patient sounds very sick or weak to the triager    Protocols used: Dizziness-A-OH

## 2024-03-21 DIAGNOSIS — K21.00 GASTROESOPHAGEAL REFLUX DISEASE WITH ESOPHAGITIS, UNSPECIFIED WHETHER HEMORRHAGE: ICD-10-CM

## 2024-03-22 RX ORDER — PANTOPRAZOLE SODIUM 40 MG/1
40 TABLET, DELAYED RELEASE ORAL DAILY
Qty: 90 TABLET | Refills: 0 | Status: SHIPPED | OUTPATIENT
Start: 2024-03-22 | End: 2024-06-13

## 2024-05-04 ENCOUNTER — NURSE TRIAGE (OUTPATIENT)
Dept: NURSING | Facility: CLINIC | Age: 39
End: 2024-05-04
Payer: MEDICAID

## 2024-05-04 NOTE — TELEPHONE ENCOUNTER
Triage Call:    Caller: Patient  He is having a problem with acid reflux.  Intermittently when laying in bed trying to go to sleep he feels like there is bile rising up to his throat.  When clearing his throat afterwards, he can taste the bile. He is on Protnoix.       When it happens there is a stinging feeling, but not pain.     Protocol Recommended Disposition: Be seen in the next 3 days.  Transferred to scheduling.       Caller verbalized understanding of instructions and questions answered.      Anna Jasso RN on 5/4/2024 at 10:28 AM    Reason for Disposition   [1] Abdominal pain is intermittent AND [2] shoots into chest, with sour taste in mouth    Additional Information   Negative: SEVERE difficulty breathing (e.g., struggling for each breath, speaks in single words)   Negative: Shock suspected (e.g., cold/pale/clammy skin, too weak to stand, low BP, rapid pulse)   Negative: Difficult to awaken or acting confused (e.g., disoriented, slurred speech)   Negative: Passed out (i.e., lost consciousness, collapsed and was not responding)   Negative: Visible sweat on face or sweat dripping down face   Negative: Sounds like a life-threatening emergency to the triager   Negative: [1] SEVERE pain (e.g., excruciating) AND [2] present > 1 hour   Negative: [1] Pain lasts > 10 minutes AND [2] age > 50   Negative: [1] Pain lasts > 10 minutes AND [2] age > 40 AND [3] associated chest, arm, neck, upper back or jaw pain   Negative: [1] Pain lasts > 10 minutes AND [2] age > 35 AND [3] at least one cardiac risk factor (e.g., diabetes, high cholesterol, hypertension, obesity, smoker or strong family history of heart disease)   Negative: [1] Pain lasts > 10 minutes AND [2] history of heart disease (i.e., heart attack, bypass surgery, angina, angioplasty, CHF; not just a heart murmur)   Negative: [1] Pain lasts > 10 minutes AND [2] difficulty breathing   Negative: [1] Vomiting AND [2] contains red blood  (Exception: Few  "streaks and only occurred once.)   Negative: [1] Vomiting AND [2] contains black (\"coffee ground\") material   Negative: Blood in bowel movements  (Exception: Blood on surface of BM with constipation.)   Negative: Black or tarry bowel movements  (Exception: Chronic-unchanged black-grey BMs AND is taking iron pills or Pepto-Bismol.)   Negative: [1] Pregnant 20 or more weeks AND [2] new hand or face swelling   Negative: [1] Vomiting AND [2] contains bile (green color)   Negative: Patient sounds very sick or weak to the triager   Negative: [1] MILD-MODERATE pain AND [2] constant AND [3] present > 2 hours   Negative: [1] MILD-MODERATE pain AND [2] not relieved by antacid medicine   Negative: [1] Vomiting AND [2] abdomen looks much more swollen than usual   Negative: White of the eyes have turned yellow (i.e., jaundice)   Negative: Fever > 103 F (39.4 C)   Negative: [1] Fever > 101 F (38.3 C) AND [2] age > 60 years   Negative: [1] Fever > 100.0 F (37.8 C) AND [2] bedridden (e.g., CVA, chronic illness, recovering from surgery)   Negative: [1] Fever > 100.0 F (37.8 C) AND [2] diabetes mellitus or weak immune system (e.g., HIV positive, cancer chemo, splenectomy, organ transplant, chronic steroids)   Negative: [1] MODERATE pain (e.g., interferes with normal activities) AND [2] comes and goes (cramps) AND [3] present > 24 hours  (Exception: Pain with Vomiting or Diarrhea - see that Guideline.)   Negative: [1] MILD pain (e.g., does not interfere with normal activities) AND [2] comes and goes (cramps) AND [3] present > 72 hours  (Exception: This same abdominal pain is a chronic symptom recurrent or ongoing AND present > 4 weeks.)   Negative: Unhealthy alcohol use, known or suspected    Protocols used: Abdominal Pain - Upper-A-AH    "

## 2024-05-06 ENCOUNTER — VIRTUAL VISIT (OUTPATIENT)
Dept: INTERNAL MEDICINE | Facility: CLINIC | Age: 39
End: 2024-05-06
Payer: MEDICAID

## 2024-05-06 DIAGNOSIS — R11.10 REGURGITATION OF FOOD: Primary | ICD-10-CM

## 2024-05-06 DIAGNOSIS — K21.9 GASTROESOPHAGEAL REFLUX DISEASE, UNSPECIFIED WHETHER ESOPHAGITIS PRESENT: ICD-10-CM

## 2024-05-06 PROCEDURE — 99213 OFFICE O/P EST LOW 20 MIN: CPT | Mod: 95 | Performed by: PHYSICIAN ASSISTANT

## 2024-05-06 NOTE — PROGRESS NOTES
Brian is a 38 year old who is being evaluated via a billable video visit.    How would you like to obtain your AVS? MyChart  If the video visit is dropped, the invitation should be resent by: Text to cell phone: [phone]  Will anyone else be joining your video visit? No      Assessment & Plan     Regurgitation of food  Significant regurgitation symptoms. Already on a PPI for GERD. Possible hiatal hernia (though not seen on previous imaging) vs. refractory GERD. Recommend GI consult. Continue on Protonix for now. Discussed avoidance of lying down within 3 hours of eating; this may reduce frequency of regurgitation.  - Adult GI  Referral - Consult Only; Future    Gastroesophageal reflux disease, unspecified whether esophagitis present  See above. Continue on Protonix. Consult with GI.  - Adult GI  Referral - Consult Only; Future    Subjective   Brian is a 38 year old, presenting for the following health issues:  Gastrointestinal Problem (Gastric problem. Please see note 5-4-24.)      5/6/2024    11:49 AM   Additional Questions   Roomed by Lizette Law MA   Accompanied by Himself     Video Start Time:  12:05 PM    History of Present Illness       Reason for visit:  Bile comes up at random times, usually when laying down  Symptom onset:  More than a month  Symptoms include:  Choking, coughing, difficulty clearing throat (takes a long time)  Symptom intensity:  Moderate  Symptom progression:  Staying the same  Had these symptoms before:  Yes  Has tried/received treatment for these symptoms:  No  What makes it worse:  None  What makes it better:  Drinking fluid    He eats 0-1 servings of fruits and vegetables daily.He consumes 1 sweetened beverage(s) daily.He exercises with enough effort to increase his heart rate 9 or less minutes per day.  He exercises with enough effort to increase his heart rate 3 or less days per week. He is missing 1 dose(s) of medications per week.  He is not taking  prescribed medications regularly due to remembering to take.     Bile/stomach contents regurgitating periodically  First started noticing this about a year ago  Thinks it might be becoming more frequent  He is worried about choking, as he sometimes feels like he has to cough and clear throat a lot to feel better. Sometimes gasps and has to try to catch his breath  Episodes usually happen while he is lying down, but they have also happened while sitting  What comes up taste acidic and can cause throat discomfort  Denies hoarse voice  No abdominal pain or vomiting    Previous diagnosis of acid reflux. Was started on Protonix a few years ago. 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      Objective           Vitals:  No vitals were obtained today due to virtual visit.    Physical Exam   GENERAL: alert and no distress  EYES: Eyes grossly normal to inspection.  No discharge or erythema, or obvious scleral/conjunctival abnormalities.  RESP: No audible wheeze, cough, or visible cyanosis.    SKIN: Visible skin clear. No significant rash, abnormal pigmentation or  lesions.  NEURO: Cranial nerves grossly intact.  Mentation and speech appropriate for age.          Video-Visit Details    Type of service:  Video Visit   Video End Time: 12:17 PM  Originating Location (pt. Location): Home    Distant Location (provider location):  On-site  Platform used for Video Visit: Wilberto  Signed Electronically by: Mamie Warren PA-C

## 2024-05-07 ENCOUNTER — TELEPHONE (OUTPATIENT)
Dept: GASTROENTEROLOGY | Facility: CLINIC | Age: 39
End: 2024-05-07
Payer: MEDICAID

## 2024-05-07 NOTE — TELEPHONE ENCOUNTER
[facility] Call Center    Phone Message    May a detailed message be left on voicemail: Yes    Reason for Call: Other: Patient is currently scheduled on 6/21, as visit type New GI Urgent. This is outside the expected timeline for this referral. Patient has been added to the waitlist.      Action Taken: Message routed to:  Other: GI REFERRAL TRIAGE POOL     Travel Screening: Not Applicable

## 2024-05-17 DIAGNOSIS — E78.5 HYPERLIPIDEMIA LDL GOAL <130: ICD-10-CM

## 2024-05-17 DIAGNOSIS — E03.8 OTHER SPECIFIED HYPOTHYROIDISM: ICD-10-CM

## 2024-05-21 RX ORDER — ATORVASTATIN CALCIUM 20 MG/1
TABLET, FILM COATED ORAL
Qty: 90 TABLET | Refills: 0 | Status: SHIPPED | OUTPATIENT
Start: 2024-05-21 | End: 2024-08-02

## 2024-05-21 RX ORDER — LEVOTHYROXINE SODIUM 50 UG/1
TABLET ORAL
Qty: 90 TABLET | Refills: 0 | Status: SHIPPED | OUTPATIENT
Start: 2024-05-21 | End: 2024-08-02

## 2024-05-31 ENCOUNTER — NURSE TRIAGE (OUTPATIENT)
Dept: FAMILY MEDICINE | Facility: CLINIC | Age: 39
End: 2024-05-31
Payer: MEDICAID

## 2024-05-31 NOTE — TELEPHONE ENCOUNTER
"FYI TO ED  Nurse Triage SBAR    Is this a 2nd Level Triage? NO    Situation: Patient called with lip tingling, feels like heart pounding, and harder to breathe.  Background: Developmentally delayed and lives alone,  And stated he called his Mom and she told him to call [facility] triage. Called Mom to have conference call and no answer, left message.   Assessment: Patient states lips are tingling for unknown amount of time start, facial redness, \"feels like heart pounding in chest\" and sometimes hard to take a breath. Denies HA, CP, fever, swelling in tongue, lips or face.   Protocol Recommended Disposition:   ED/UC or PCP.      Recommendation: To be evaluated in UC/ED for possible allergic reaction, called Mother and no answer, patient requested 911 to be called. RN called with patient on the line and dispatch took over call.  Routed to provider    Does the patient meet one of the following criteria for ADS visit consideration? 16+ years old, with an [facility] PCP   Yissel Grullon [facility] - Registered Nurse  Clinic Triage [place]   May 31, 2024      Reason for Disposition   Patient sounds very sick or weak to the triager    Additional Information   Negative: Shock suspected (e.g., cold/pale/clammy skin, too weak to stand, low BP, rapid pulse)   Negative: Similar pain previously and it was from 'heart attack'   Negative: Similar pain previously and it was from 'angina' and not relieved by nitroglycerin   Negative: Sounds like a life-threatening emergency to the triager   Negative: Chest pain   Negative: Sinus pain and congestion   Negative: Headache or pain in upper forehead   Negative: Toothache is main symptom   Negative: Followed a face injury   Negative: Difficulty breathing or unusual sweating (e.g., sweating without exertion)   Negative: Can't close the mouth fully (i.e., \"mouth is locked open\", patient will have difficulty talking)   Negative: Fever and localized red rash   Negative: Fever and area of face is " "swollen   Negative: New-onset jaw pain of unknown cause AND at least one cardiac risk factor (e.g., 45 years or older, diabetes, high cholesterol, hypertension, obesity, smoker or strong family history of heart disease)    Answer Assessment - Initial Assessment Questions  1. ONSET: \"When did the pain start?\" (e.g., minutes, hours, days)    5/31 long   2. ONSET: \"Does the pain come and go, or has it been constant since it started?\" (e.g., constant, intermittent, fleeting)  Lips tingling  3. SEVERITY: \"How bad is the pain?\"   (Scale 1-10; mild, moderate or severe)    - MILD (1-3): doesn't interfere with normal activities     - MODERATE (4-7): interferes with normal activities or awakens from sleep     - SEVERE (8-10): excruciating pain, unable to do any normal activities     mild  4. LOCATION: \"Where does it hurt?\"    Bilateral lips  5. RASH: \"Is there any redness, rash, or swelling of the face?\"    no  6. FEVER: \"Do you have a fever?\" If Yes, ask: \"What is it, how was it measured, and when did it start?\"   no  7. OTHER SYMPTOMS: \"Do you have any other symptoms?\" (e.g., fever, toothache, nasal discharge, nasal congestion, clicking sensation in jaw joint)  Hard to breathe  8. PREGNANCY: \"Is there any chance you are pregnant?\" \"When was your last menstrual period?\"     N/a    Protocols used: Face Pain-A-OH    "

## 2024-06-13 DIAGNOSIS — K21.00 GASTROESOPHAGEAL REFLUX DISEASE WITH ESOPHAGITIS, UNSPECIFIED WHETHER HEMORRHAGE: ICD-10-CM

## 2024-06-13 RX ORDER — PANTOPRAZOLE SODIUM 40 MG/1
40 TABLET, DELAYED RELEASE ORAL DAILY
Qty: 30 TABLET | Refills: 2 | Status: SHIPPED | OUTPATIENT
Start: 2024-06-13 | End: 2024-09-06

## 2024-06-21 ENCOUNTER — OFFICE VISIT (OUTPATIENT)
Dept: GASTROENTEROLOGY | Facility: CLINIC | Age: 39
End: 2024-06-21
Attending: PHYSICIAN ASSISTANT
Payer: MEDICAID

## 2024-06-21 VITALS
DIASTOLIC BLOOD PRESSURE: 70 MMHG | HEART RATE: 84 BPM | BODY MASS INDEX: 32.43 KG/M2 | WEIGHT: 183 LBS | SYSTOLIC BLOOD PRESSURE: 108 MMHG | HEIGHT: 63 IN

## 2024-06-21 DIAGNOSIS — R11.10 REGURGITATION OF FOOD: ICD-10-CM

## 2024-06-21 DIAGNOSIS — K21.9 GASTROESOPHAGEAL REFLUX DISEASE, UNSPECIFIED WHETHER ESOPHAGITIS PRESENT: ICD-10-CM

## 2024-06-21 DIAGNOSIS — R10.13 EPIGASTRIC PAIN: Primary | ICD-10-CM

## 2024-06-21 DIAGNOSIS — R14.2 BURPING: ICD-10-CM

## 2024-06-21 PROCEDURE — 99204 OFFICE O/P NEW MOD 45 MIN: CPT | Performed by: NURSE PRACTITIONER

## 2024-06-21 ASSESSMENT — PAIN SCALES - GENERAL: PAINLEVEL: NO PAIN (0)

## 2024-06-21 NOTE — PATIENT INSTRUCTIONS
"It was a pleasure taking care of you today.  I've included a brief summary of our discussion and care plan from today's visit below.  Please review this information with your primary care provider.  ______________________________________________________________________    My recommendations are summarized as follows:    As we discussed today, I am referring you to upper GI endoscopy.  You will be contacted to schedule the procedure.    2.  Please call and schedule abdominal ultrasound.    3.  Continue pantoprazole at the current dose    Return to GI Clinic in 3 months  to review your progress.    ______________________________________________________________________    Gastroesophageal reflux  Gastroesophageal reflux, also called \"acid reflux,\" occurs when the stomach contents back up into the esophagus and/or mouth. Occasional reflux is normal and can happen in healthy infants, children, and adults, most often after eating a large meal. Most episodes are brief and do not cause bothersome symptoms or complications.   By contrast, people with gastroesophageal reflux disease (GERD) experience bothersome symptoms or damage to the esophagus as a result of acid reflux. Symptoms of GERD can include heartburn, regurgitation, and difficulty or pain with swallowing.  The main cause of GERD is a transient relaxation or weakening of the lower esophageal sphincter (LES) which allows regurgitation of gastric acid and other gastric contents, including bile, back into the esophagus. The esophageal lining is susceptible to irritation by acid because it does not have the thick mucus protection of the stomach. Some people with GERD do not experience heartburn but may have burning sensation in the mouth, a feeling that food is stuck at any level of the esophagus, or hoarseness in the morning.  There are a number of predisposing factors associated with GERD, including a hiatal hernia, cigarette smoking, alcohol use, being overweight or " obese, and pregnancy. Foods such as citrus fruits, chocolate, caffeinated drinks, fried foods, garlic, onions, spicy foods, and tomato-based foods, such as chili, pizza, and spaghetti sauce, are associated with heartburn symptoms. Consumption of large high-fat meals requires prolonged gastric passage times and the increased stomach pressure may lead to movement of hydrochloric acid from the stomach into the esophagus. Additionally, lying prone after a meal promotes backflow of stomach contents and the development of symptoms.      Lifestyle modifications for gastroesophageal reflux disease (GERD).   1. Change your eating habits.  -- It's best to eat several small meals instead of two or three large meals.  -- After you eat, wait 2 to 3 hours before you lie down. Late-night snacks aren't a good idea.   -- Chocolate, mint, and alcohol can make GERD worse. They relax the valve between the esophagus and the stomach.  -- Spicy foods, foods that have a lot of acid (like tomatoes and oranges), foods with high fat content, and coffee can make GERD symptoms worse in some people. If your symptoms are worse after you eat certain foods, try to avoid them.     2. Do not smoke or chew tobacco. Saliva helps to neutralize refluxed acid, and smoking reduces the amount of saliva in the mouth and throat. Smoking also lowers the pressure in the lower esophageal sphincter and provokes coughing, causing frequent episodes of acid reflux in the esophagus.     3. If you have GERD symptoms at night, raise the head of your bed 6 in. (15 cm) to 8 in. (20 cm) by putting the frame on blocks or placing a foam wedge under the head of your mattress. (Adding extra pillows does not work.)  4. Avoid or reduce pressure on your stomach. Don't wear tight clothing around your middle.  5. Lose weight if you need to. Losing just 5 to 10 pounds can help.        ____________________________________________________________________        Who do I call with any  questions after my visit?  Please be in touch if there are any further questions that arise following today's visit.  There are multiple ways to contact your gastroenterology care team.      During business hours, you may reach a Gastroenterology nurse at [phone], option 3.     To schedule or reschedule an appointment, please call [phone].   To schedule your imaging studies (CT, MRI, ultrasound)  call [phone] (or toll-free # [phone])  To schedule your lab work at [facility], please call [phone]    You can always send a secure message through [employer]Peerient.  [employer]ient messages are answered by your nurse or doctor typically within 24 hours.  Please allow extra time on weekends and holidays.      For urgent/emergent questions after business hours, you may reach the on-call GI Fellow by contacting the [facility]  at [phone].    In order for your refill to be processed in a timely fashion, it is your responsibility to ensure you follow the recommendations from your provider regarding your laboratory studies and follow up appointments.       How will I get the results of any tests ordered?    You will receive all of your results.  If you have signed up for [employer], any tests ordered at your visit will be available to you after your physician reviews them.  Typically this takes 1-2 weeks.  If there are urgent results that require a change in your care plan, your physician or nurse will call you to discuss the next steps.   What is [employer]?  [employer]ient is a secure way for you to access all of your healthcare records from the [facility].  It is a web based computer program, so you can sign on to it from any location.  It also allows you to send secure messages to your care team.  I recommend signing up for [employer]Peerient access if you have not already done so and are comfortable with using a computer.    How to I schedule a follow-up visit?  If you did  not schedule a follow-up visit today, please call [phone] to schedule a follow-up office visit.      Sincerely,  JEFFREY Miles,  [facility],  Division of Gastroenterology   ([facility])

## 2024-06-21 NOTE — LETTER
"6/21/2024      Brian Ortiz  [address]  [place] [address]      Dear Colleague,    Thank you for referring your patient, Brian Ortiz, to the [facility]. Please see a copy of my visit note below.      [facility]  [address] [facility] [address]  Phone: [phone]    Patient:  Brian Ortiz, Date of birth 1985    Referring Provider Mamie Warren    Gastroenterology CLINIC VISIT, NEW PATIENT    REASON FOR CONSULTATION: GERD    HPI: 38 year old male with PMH of hyperlipidemia, HTN, and schizophrenia, was referred  to GI clinic for a consult on GERD symptoms.  Patient reported burning in his chest and acid regurgitation almost every day prior to initiation of pantoprazole therapy.  Now, he is having those symptoms a few times a month.  He complains of burping and hiccups.  Stated that he has acidic regurgitation into his mouth that he swallows back.  At times, he aspirates and has a cough spell that lasted approximately 5 minutes.  He is wondering if Protonix can cause those symptoms which are relatively new for him.  He denies any emesis.  Stated his appetite is poor because of \"lack of motivation to eat\".  Eats twice a day.  He denies any changes to his weight.  Complains of epigastric and upper abdominal pain, not certain if it is related to meal intake.  Patient stated he has bowel movement once a day, describes his stools as 1-3 on the [place] scale.  No history of abdominal surgeries.    PREVIOUS ENDOSCOPY:  None  PERTINENT STUDIES Reviewed in EMR  3/2/2023 CT scan of abdomen and pelvis  FINDINGS:   LOWER CHEST: Normal.     HEPATOBILIARY: No significant mass or bile duct dilatation. No calcified gallstones.      PANCREAS: Normal.     SPLEEN: Normal.     ADRENAL GLANDS: Normal.     KIDNEYS/BLADDER: No significant mass, stone, or hydronephrosis.     BOWEL: Moderate distention of the stomach with fluid. Remainder of the " bowel is unremarkable without evidence of obstruction or inflammatory change. Normal appendix.     LYMPH NODES: Normal.     VASCULATURE: Unremarkable.     PELVIC ORGANS: Normal.     MUSCULOSKELETAL: Normal.                                                                   IMPRESSION:   1.  Moderate gastric distention. Otherwise, no acute process in the abdomen or pelvis.    ROS: 10pt ROS performed and otherwise negative.    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  Bilateral sagittal split ramus osteotomy.  [facility]     ZZHC CREATE EARDRUM OPENING,GEN ANESTH      P.E. Tubes         PERTINENT MEDICATIONS:  Current Outpatient Medications   Medication Sig Dispense Refill     atorvastatin (LIPITOR) 20 MG tablet TAKE 1 TABLET BY MOUTH EVERY NIGHT AT BEDTIME 90 tablet 0     FANAPT 2 MG TABS tablet Take 2 mg by mouth       FANAPT 6 MG TABS tablet Take 6 mg by mouth At night       hydrOXYzine HCl (ATARAX) 25 MG tablet Take 25 mg by mouth       levothyroxine (SYNTHROID/LEVOTHROID) 50 MCG tablet TAKE 1 TABLET BY MOUTH DAILY 90 tablet 0     pantoprazole (PROTONIX) 40 MG EC tablet TAKE 1 TABLET BY MOUTH DAILY 30 tablet 2     sertraline (ZOLOFT) 100 MG tablet Take 150 mg by mouth daily           SOCIAL HISTORY:  Social History     Socioeconomic History     Marital status: Single     Spouse name:  "Not on file     Number of children: Not on file     Years of education: Not on file     Highest education level: Not on file   Occupational History     Not on file   Tobacco Use     Smoking status: Never     Smokeless tobacco: Never   Vaping Use     Vaping status: Never Used   Substance and Sexual Activity     Alcohol use: No     Alcohol/week: 0.0 standard drinks of alcohol     Drug use: No     Sexual activity: Never   Other Topics Concern     Parent/sibling w/ CABG, MI or angioplasty before 65F 55M? Not Asked   Social History Narrative     Not on file     Social Determinants of Health     Financial Resource Strain: Not on file   Food Insecurity: Not on file   Transportation Needs: Not on file   Physical Activity: Not on file   Stress: Not on file   Social Connections: Not on file   Interpersonal Safety: Not At Risk (5/31/2024)    [facility]    Intimate Partner Violence      Are you in a relationship where you are physically hurt, threatened and/or made to feel afraid?: No   Housing Stability: Not on file       FAMILY HISTORY:  Denies colon/panc/esophageal/other GI CA, no other Gracia or other HPS-related Chavez. No IBD/celiac, no other AI/liver/thyroid disease.    Family History   Adopted: Yes   Problem Relation Age of Onset     Substance Abuse Mother      Substance Abuse Father        PHYSICAL EXAMINATION:  Vitals reviewed  /70 (BP Location: Right arm, Patient Position: Sitting, Cuff Size: Adult Regular)   Pulse 84   Ht 1.602 m (5' 3.07\")   Wt 83 kg (183 lb)   BMI 32.35 kg/m      General: Patient appears well in no acute distress.    Skin: No visualized rash or lesions on visualized skin  HEENT:    EOMI, no erythema, sclera icterus or discharge noted.  Mouth mucosa intact, pink, moist  No cervical or supraclavicular lymphadenopathy. Thyroid gland not enlarged.  Resp: breathing comfortably without accessory muscle usage, speaking in full sentences, no cough. Lung sounds " clear  Card: Regular and rhythmic S1 and S2. No gallop or rub. No murmur.  No LE edema.  Abdomen: Active bowel sounds X 4 quadrants. Soft to palpation.  Tenderness on palpation in epigastrium and upper periumbilical area.  No guarding or rebound tenderness. Sapp's sign negative.  MSK: Appears to have normal range of motion based on visualized movements  Neurologic: No apparent tremors, facial movements symmetric  Psych: affect normal, alert and oriented      ASSESSMENT/PLAN:    ICD-10-CM    1. Epigastric pain  R10.13 Adult GI  Referral - Procedure Only     US Abdomen Complete      2. Regurgitation of food  R11.10 Adult GI  Referral - Consult Only     Adult GI  Referral - Procedure Only     US Abdomen Complete      3. Gastroesophageal reflux disease, unspecified whether esophagitis present  K21.9 Adult GI  Referral - Consult Only     Adult GI  Referral - Procedure Only     US Abdomen Complete      4. Burping  R14.2 Adult GI  Referral - Procedure Only     US Abdomen Complete         38 year old male  presented to GI clinic for a consultation on GERD symptoms.  Patient complains of ongoing painful acid regurgitation into his mouth, burping, hiccups, and cough.  He reported upper abdominal pain, not related to defecation or meal intake.  Denies any problems with bowel elimination.   Those are ongoing symptoms for the last 1 year.  Patient stated he was started on pantoprazole and takes it faithfully 40 mg a day with his other pills, after breakfast.  Patient was educated to switch administration of pantoprazole to preprandial, 30 to 60 minutes before breakfast and other medications.  Due to ongoing symptoms, will refer the patient to upper GI endoscopy under MAC sedation.  Patient then made a comment of inappropriate comments after anesthesia in the past.  Would recommend observation postoperatively.  Noted the patient is on Fanapt.  Will request a preoperative  physical examination with EKG.  He recently had normal BMP and CBC.  Abdominal ultrasound was also ordered to exclude hepatoma etiology of the patient's symptoms.  Education on GERD friendly diet was provided.      Patient verbalized understanding and appreciation of care provided. Stated that all of the questions were answered to her/his satisfaction.  RTC in 3 months    Thank you for this consultation. It was a pleasure to participate in the care of this patient; please contact us with any further questions.    DANA Miles, OFEP-C  Division of Gastroenterology  [facility], [place], [place]    This note was created with Dragon voice recognition software, and while reviewed for accuracy, inadvertent minor typographic errors may occur. Please contact the provider if you have any questions.       Again, thank you for allowing me to participate in the care of your patient.        Sincerely,        DANA MILES CNP

## 2024-06-21 NOTE — PROGRESS NOTES
"  [address] [address]  Phone: [phone]    Patient:  Brian Ortiz, Date of birth 1985    Referring Provider Mamie Warren    Gastroenterology CLINIC VISIT, NEW PATIENT    REASON FOR CONSULTATION: GERD    HPI: 38 year old male with PMH of hyperlipidemia, HTN, and schizophrenia, was referred  to GI clinic for a consult on GERD symptoms.  Patient reported burning in his chest and acid regurgitation almost every day prior to initiation of pantoprazole therapy.  Now, he is having those symptoms a few times a month.  He complains of burping and hiccups.  Stated that he has acidic regurgitation into his mouth that he swallows back.  At times, he aspirates and has a cough spell that lasted approximately 5 minutes.  He is wondering if Protonix can cause those symptoms which are relatively new for him.  He denies any emesis.  Stated his appetite is poor because of \"lack of motivation to eat\".  Eats twice a day.  He denies any changes to his weight.  Complains of epigastric and upper abdominal pain, not certain if it is related to meal intake.  Patient stated he has bowel movement once a day, describes his stools as 1-3 on the [place] scale.  No history of abdominal surgeries.    PREVIOUS ENDOSCOPY:  None  PERTINENT STUDIES Reviewed in EMR  3/2/2023 CT scan of abdomen and pelvis  FINDINGS:   LOWER CHEST: Normal.     HEPATOBILIARY: No significant mass or bile duct dilatation. No calcified gallstones.      PANCREAS: Normal.     SPLEEN: Normal.     ADRENAL GLANDS: Normal.     KIDNEYS/BLADDER: No significant mass, stone, or hydronephrosis.     BOWEL: Moderate distention of the stomach with fluid. Remainder of the bowel is unremarkable without evidence of obstruction or inflammatory change. Normal appendix.     LYMPH NODES: Normal.     VASCULATURE: Unremarkable.     PELVIC ORGANS: Normal.     MUSCULOSKELETAL: Normal.                                        "                            IMPRESSION:   1.  Moderate gastric distention. Otherwise, no acute process in the abdomen or pelvis.    ROS: 10pt ROS performed and otherwise negative.    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 08/13/2004    Other specified congenital anomaly of kidney     congenital single kidney    Scoliosis associated with other condition        PREVIOUS ABDOMINAL/GYNECOLOGIC SURGERIES:  Past Surgical History:   Procedure Laterality Date    SURGICAL HISTORY OF -   08/10/2004    Madibular midline ostectom;y with anterior ilac crest bone graft.  Bilateral sagittal split ramus osteotomy.  [facility]    ZZHC CREATE EARDRUM OPENING,GEN ANESTH      P.E. Tubes         PERTINENT MEDICATIONS:  Current Outpatient Medications   Medication Sig Dispense Refill    atorvastatin (LIPITOR) 20 MG tablet TAKE 1 TABLET BY MOUTH EVERY NIGHT AT BEDTIME 90 tablet 0    FANAPT 2 MG TABS tablet Take 2 mg by mouth      FANAPT 6 MG TABS tablet Take 6 mg by mouth At night      hydrOXYzine HCl (ATARAX) 25 MG tablet Take 25 mg by mouth      levothyroxine (SYNTHROID/LEVOTHROID) 50 MCG tablet TAKE 1 TABLET BY MOUTH DAILY 90 tablet 0    pantoprazole (PROTONIX) 40 MG EC tablet TAKE 1 TABLET BY MOUTH DAILY 30 tablet 2    sertraline (ZOLOFT) 100 MG tablet Take 150 mg by mouth daily           SOCIAL HISTORY:  Social History     Socioeconomic History    Marital status: Single     Spouse name: Not on file    Number of children: Not on file    Years of education: Not on file    Highest education level: Not on file   Occupational History    Not on file   Tobacco Use    Smoking status: Never    Smokeless tobacco: Never   Vaping Use    Vaping status: Never  "Used   Substance and Sexual Activity    Alcohol use: No     Alcohol/week: 0.0 standard drinks of alcohol    Drug use: No    Sexual activity: Never   Other Topics Concern    Parent/sibling w/ CABG, MI or angioplasty before 65F 55M? Not Asked   Social History Narrative    Not on file     Social Determinants of Health     Financial Resource Strain: Not on file   Food Insecurity: Not on file   Transportation Needs: Not on file   Physical Activity: Not on file   Stress: Not on file   Social Connections: Not on file   Interpersonal Safety: Not At Risk (5/31/2024)    Received from [facility] and [facility]    Intimate Partner Violence     Are you in a relationship where you are physically hurt, threatened and/or made to feel afraid?: No   Housing Stability: Not on file       FAMILY HISTORY:  Denies colon/panc/esophageal/other GI CA, no other Gracia or other HPS-related Chavez. No IBD/celiac, no other AI/liver/thyroid disease.    Family History   Adopted: Yes   Problem Relation Age of Onset    Substance Abuse Mother     Substance Abuse Father        PHYSICAL EXAMINATION:  Vitals reviewed  /70 (BP Location: Right arm, Patient Position: Sitting, Cuff Size: Adult Regular)   Pulse 84   Ht 1.602 m (5' 3.07\")   Wt 83 kg (183 lb)   BMI 32.35 kg/m      General: Patient appears well in no acute distress.    Skin: No visualized rash or lesions on visualized skin  HEENT:    EOMI, no erythema, sclera icterus or discharge noted.  Mouth mucosa intact, pink, moist  No cervical or supraclavicular lymphadenopathy. Thyroid gland not enlarged.  Resp: breathing comfortably without accessory muscle usage, speaking in full sentences, no cough. Lung sounds clear  Card: Regular and rhythmic S1 and S2. No gallop or rub. No murmur.  No LE edema.  Abdomen: Active bowel sounds X 4 quadrants. Soft to palpation.  Tenderness on palpation in epigastrium and upper periumbilical area.  No guarding or rebound tenderness. Sapp's sign " negative.  MSK: Appears to have normal range of motion based on visualized movements  Neurologic: No apparent tremors, facial movements symmetric  Psych: affect normal, alert and oriented      ASSESSMENT/PLAN:    ICD-10-CM    1. Epigastric pain  R10.13 Adult GI  Referral - Procedure Only     US Abdomen Complete      2. Regurgitation of food  R11.10 Adult GI  Referral - Consult Only     Adult GI  Referral - Procedure Only     US Abdomen Complete      3. Gastroesophageal reflux disease, unspecified whether esophagitis present  K21.9 Adult GI  Referral - Consult Only     Adult GI  Referral - Procedure Only     US Abdomen Complete      4. Burping  R14.2 Adult GI  Referral - Procedure Only     US Abdomen Complete         38 year old male  presented to GI clinic for a consultation on GERD symptoms.  Patient complains of ongoing painful acid regurgitation into his mouth, burping, hiccups, and cough.  He reported upper abdominal pain, not related to defecation or meal intake.  Denies any problems with bowel elimination.   Those are ongoing symptoms for the last 1 year.  Patient stated he was started on pantoprazole and takes it faithfully 40 mg a day with his other pills, after breakfast.  Patient was educated to switch administration of pantoprazole to preprandial, 30 to 60 minutes before breakfast and other medications.  Due to ongoing symptoms, will refer the patient to upper GI endoscopy under MAC sedation.  Patient then made a comment of inappropriate comments after anesthesia in the past.  Would recommend observation postoperatively.  Noted the patient is on Fanapt.  Will request a preoperative physical examination with EKG.  He recently had normal BMP and CBC.  Abdominal ultrasound was also ordered to exclude hepatoma etiology of the patient's symptoms.  Education on GERD friendly diet was provided.      Patient verbalized understanding and appreciation of care  provided. Stated that all of the questions were answered to her/his satisfaction.  RTC in 3 months    Thank you for this consultation. It was a pleasure to participate in the care of this patient; please contact us with any further questions.    DANA Miles, HENRY-C  Division of Gastroenterology  [facility], [place], [place]    This note was created with Dragon voice recognition software, and while reviewed for accuracy, inadvertent minor typographic errors may occur. Please contact the provider if you have any questions.

## 2024-06-24 ENCOUNTER — TELEPHONE (OUTPATIENT)
Dept: GASTROENTEROLOGY | Facility: CLINIC | Age: 39
End: 2024-06-24
Payer: MEDICAID

## 2024-06-24 NOTE — TELEPHONE ENCOUNTER
"Endoscopy Scheduling Screen    Have you had a positive Covid test in the last 14 days?  No    What is your communication preference for Instructions and/or Bowel Prep?   MyChart    What insurance is in the chart?  Other:  MEDICAID    Ordering/Referring Provider: SINDY VIERA   (If ordering provider performs procedure, schedule with ordering provider unless otherwise instructed. )    BMI: Estimated body mass index is 32.35 kg/m  as calculated from the following:    Height as of 6/21/24: 1.602 m (5' 3.07\").    Weight as of 6/21/24: 83 kg (183 lb).     Sedation Ordered  MAC/deep sedation.   BMI<= 45 45 < BMI <= 48 48 < BMI < = 50  BMI > 50   No Restrictions No [facility]  No [facility]  [facility] with exceptions Hospital Only OR Only       Do you have a history of malignant hyperthermia?  No    (Females) Are you currently pregnant?   No     Have you been diagnosed or told you have pulmonary hypertension?   No    Do you have an LVAD?  No    Have you been told you have moderate to severe sleep apnea?  No    Have you been told you have COPD, asthma, or any other lung disease?  No    Do you have any heart conditions?  No     Have you ever had or are you waiting for an organ transplant?  No. Continue scheduling, no site restrictions.    Have you had a stroke or transient ischemic attack (TIA aka \"mini stroke\" in the last 6 months?   No    Have you been diagnosed with or been told you have cirrhosis of the liver?   No    Are you currently on dialysis?   No    Do you need assistance transferring?   No    BMI: Estimated body mass index is 32.35 kg/m  as calculated from the following:    Height as of 6/21/24: 1.602 m (5' 3.07\").    Weight as of 6/21/24: 83 kg (183 lb).     Is patients BMI > 40 and scheduling location [facility]?  No    Do you take an injectable medication for weight loss or diabetes (excluding insulin)?  No    Do you take the medication Naltrexone?  No    Do you take blood thinners?  No       Prep   Are you currently on " dialysis or do you have chronic kidney disease?  No    Do you have a diagnosis of diabetes?  No    Do you have a diagnosis of cystic fibrosis (CF)?  No    On a regular basis do you go 3 -5 days between bowel movements?  Yes (Extended Prep)    BMI > 40?  No    Preferred Pharmacy:    [facility] [place] - [address]  [address]  [address]  [place] [address]  Phone: [phone] Fax: [phone]      Final Scheduling Details     Procedure scheduled  Upper endoscopy (EGD)    Surgeon:  SHIMON     Date of procedure:  8/6/24     Pre-OP / PAC:   Yes - Patient informed of pre-op requirement.    Location  [facility] - Per order.    Sedation   MAC/Deep Sedation - Per order.      Patient Reminders:   You will receive a call from a Nurse to review instructions and health history.  This assessment must be completed prior to your procedure.  Failure to complete the Nurse assessment may result in the procedure being cancelled.      On the day of your procedure, please designate an adult(s) who can drive you home stay with you for the next 24 hours. The medicines used in the exam will make you sleepy. You will not be able to drive.      You cannot take public transportation, ride share services, or non-medical taxi service without a responsible caregiver.  Medical transport services are allowed with the requirement that a responsible caregiver will receive you at your destination.  We require that drivers and caregivers are confirmed prior to your procedure.

## 2024-07-04 ENCOUNTER — NURSE TRIAGE (OUTPATIENT)
Dept: NURSING | Facility: CLINIC | Age: 39
End: 2024-07-04
Payer: MEDICAID

## 2024-07-05 NOTE — TELEPHONE ENCOUNTER
The corner of mouth is numb and hard to retain smile. It just started. It's also numb on his mouth and cheek. He will call 911.  Katerin Ardon RN  [facility]    Reason for Disposition   [1] Numbness (i.e., loss of sensation) of the face, arm / hand, or leg / foot on one side of the body AND [2] sudden onset AND [3] present now    Additional Information   Negative: [1] SEVERE weakness (i.e., unable to walk or barely able to walk, requires support) AND [2] new-onset or worsening   Negative: [1] Weakness (i.e., paralysis, loss of muscle strength) of the face, arm / hand, or leg / foot on one side of the body AND [2] sudden onset AND [3] present now  (Exception: Bell's palsy suspected [i.e., weakness only on one side of the face, developing over hours to days, no other symptoms].)    Protocols used: Neurologic Deficit-A-AH

## 2024-07-15 ENCOUNTER — HOSPITAL ENCOUNTER (OUTPATIENT)
Dept: ULTRASOUND IMAGING | Facility: CLINIC | Age: 39
Discharge: HOME | End: 2024-07-15
Attending: NURSE PRACTITIONER | Admitting: NURSE PRACTITIONER
Payer: MEDICAID

## 2024-07-15 DIAGNOSIS — R11.10 REGURGITATION OF FOOD: ICD-10-CM

## 2024-07-15 DIAGNOSIS — R10.13 EPIGASTRIC PAIN: ICD-10-CM

## 2024-07-15 DIAGNOSIS — R14.2 BURPING: ICD-10-CM

## 2024-07-15 DIAGNOSIS — K21.9 GASTROESOPHAGEAL REFLUX DISEASE, UNSPECIFIED WHETHER ESOPHAGITIS PRESENT: ICD-10-CM

## 2024-07-15 PROCEDURE — 76700 US EXAM ABDOM COMPLETE: CPT

## 2024-07-23 RX ORDER — CLOPIDOGREL BISULFATE 75 MG/1
75 TABLET ORAL
COMMUNITY
Start: 2024-07-05 | End: 2024-08-02

## 2024-07-24 ENCOUNTER — TELEPHONE (OUTPATIENT)
Dept: FAMILY MEDICINE | Facility: CLINIC | Age: 39
End: 2024-07-24
Payer: MEDICAID

## 2024-07-25 NOTE — TELEPHONE ENCOUNTER
Patient returned call. Informed him of the instructions for Plavix prior to EGD.  Patient states he did miss a few doses and has 7 pills left. If he continued with these 7 pills he would take the last dose on 8/1. That would leave only 5 days of being off Plavix prior to EGD.     Routing to PCP to further recommend if he should complete these 7 days of Plavix or stop sooner.    Abiola WILSONN, RN

## 2024-07-25 NOTE — TELEPHONE ENCOUNTER
Same day surgery contacted me regarding patient's Plavix (clopidogrel) and if he can hold it for EGD.    I reviewed patient's hospital notes from ER at [facility], and they recommended 3 weeks of Plavix (clopidogrel), ending on 7/25/24.  Therefore, he should be done with medication this week and then will be off Plavix (clopidogrel) long enough to have his EGD as planned on 8/6/24    Will have staff call patient and varrify that he will finish medication this week and know that he cannot take medication within 5-7 days of his planned procedure.    Olu Joseph MD

## 2024-07-25 NOTE — TELEPHONE ENCOUNTER
----- Message from Jelena MON sent at 7/23/2024  7:14 PM CDT -----  Regarding: EGD scheduled for 8/6/24, on Plavix for recent TIA  Margaret,  We are writing this note from Same Day Surgery to see if one of you would give this patient clearance to hold Plavix 5-7 days prior to an EGD schedule for Tuesday August 6, 2024.  Per the EMR this patient was seen in the ED at [place] on 7-4-2024 and diagnosed with a TIA.  Kari Warren, Cardiology in [place] is also following this patient.  Thank you for your assistance in this patient's care.    Jelena Sweeney RN  [facility]

## 2024-07-26 NOTE — TELEPHONE ENCOUNTER
Called and spoke with pt, relayed message below per IRISH.  Pt verbalized understanding.    Anjali Chavarria MA

## 2024-07-26 NOTE — TELEPHONE ENCOUNTER
He can just go ahead and stop it now.  He is far enough away from the event that the medication risks are higher than any further benefit.  Will have staff notify patient.     Olu Joseph MD

## 2024-08-01 RX ORDER — CETIRIZINE HYDROCHLORIDE 10 MG/1
10 TABLET ORAL DAILY
COMMUNITY

## 2024-08-02 ENCOUNTER — OFFICE VISIT (OUTPATIENT)
Dept: FAMILY MEDICINE | Facility: CLINIC | Age: 39
End: 2024-08-02
Payer: MEDICAID

## 2024-08-02 VITALS
DIASTOLIC BLOOD PRESSURE: 78 MMHG | OXYGEN SATURATION: 98 % | WEIGHT: 179.2 LBS | HEIGHT: 64 IN | HEART RATE: 99 BPM | TEMPERATURE: 97.6 F | BODY MASS INDEX: 30.59 KG/M2 | SYSTOLIC BLOOD PRESSURE: 106 MMHG | RESPIRATION RATE: 14 BRPM

## 2024-08-02 DIAGNOSIS — R11.10 REGURGITATION OF FOOD: ICD-10-CM

## 2024-08-02 DIAGNOSIS — K21.00 GASTROESOPHAGEAL REFLUX DISEASE WITH ESOPHAGITIS, UNSPECIFIED WHETHER HEMORRHAGE: ICD-10-CM

## 2024-08-02 DIAGNOSIS — Z01.818 PREOP GENERAL PHYSICAL EXAM: Primary | ICD-10-CM

## 2024-08-02 DIAGNOSIS — E78.5 HYPERLIPIDEMIA LDL GOAL <130: ICD-10-CM

## 2024-08-02 DIAGNOSIS — E03.9 HYPOTHYROIDISM, UNSPECIFIED TYPE: ICD-10-CM

## 2024-08-02 DIAGNOSIS — F20.3 UNDIFFERENTIATED SCHIZOPHRENIA (H): ICD-10-CM

## 2024-08-02 DIAGNOSIS — E03.8 OTHER SPECIFIED HYPOTHYROIDISM: ICD-10-CM

## 2024-08-02 PROCEDURE — G2211 COMPLEX E/M VISIT ADD ON: HCPCS | Performed by: FAMILY MEDICINE

## 2024-08-02 PROCEDURE — 99214 OFFICE O/P EST MOD 30 MIN: CPT | Performed by: FAMILY MEDICINE

## 2024-08-02 RX ORDER — LEVOTHYROXINE SODIUM 50 UG/1
50 TABLET ORAL DAILY
Qty: 90 TABLET | Refills: 0 | Status: SHIPPED | OUTPATIENT
Start: 2024-08-02

## 2024-08-02 RX ORDER — ATORVASTATIN CALCIUM 20 MG/1
20 TABLET, FILM COATED ORAL AT BEDTIME
Qty: 90 TABLET | Refills: 0 | Status: SHIPPED | OUTPATIENT
Start: 2024-08-02

## 2024-08-02 ASSESSMENT — PAIN SCALES - GENERAL: PAINLEVEL: NO PAIN (0)

## 2024-08-02 NOTE — CONFIDENTIAL NOTE
"    Interpersonal Safety (Abuse) Screening Follow Up    Interpersonal Safety Screen  Do you feel physically and emotionally safe where you currently live?: No (does not feel physically safe)  Within the past 12 months, have you been hit, slapped, kicked or otherwise physically hurt by someone?: No  Within the past 12 months, have you been humiliated or emotionally abused in other ways by your partner or ex-partner?: No      Summary of concern: patient with history of schizophrenia and he lives in a supervised setting.  He notes that he has uneasy concerns about \"bad guys\" coming after him.  There are cameras onsite, but  he has concerned about the wifi capabilities.  Has underlying concerns about friends being suspicious.      Follow Up  Recommended continued patient conversations with psychiatrit and his parents about making sure his safety is monitored.        "

## 2024-08-02 NOTE — PROGRESS NOTES
Preoperative Evaluation  [address] [address]  Phone: [phone]  Fax: [phone]  Primary Provider: Olu Joseph MD  Pre-op Performing Provider: Olu Joseph MD  Aug 2, 2024             8/2/2024   Surgical Information   What procedure is being done? Upper endoscopy   Facility or Hospital where procedure/surgery will be performed: [facility]   Who is doing the procedure / surgery? Dr Dennison   Date of surgery / procedure: Aug 6 2024   Time of surgery / procedure: Arrive by 12:30pm   Where do you plan to recover after surgery? at home with family        Fax number for surgical facility: Note does not need to be faxed, will be available electronically in Epic.    Assessment & Plan     The proposed surgical procedure is considered LOW risk.    ASSESSMENT/ORDERS:    ICD-10-CM    1. Preop general physical exam  Z01.818       2. Regurgitation of food  R11.10       3. Gastroesophageal reflux disease with esophagitis, unspecified whether hemorrhage  K21.00       4. Hyperlipidemia LDL goal <130  E78.5 Lipid panel reflex to direct LDL Fasting     atorvastatin (LIPITOR) 20 MG tablet      5. Hypothyroidism, unspecified type  E03.9 TSH      6. Undifferentiated schizophrenia (H)  F20.3       7. Other specified hypothyroidism  E03.8 levothyroxine (SYNTHROID/LEVOTHROID) 50 MCG tablet        PLAN:  Patient did have a recent episode of strokelike symptoms that have spontaneously resolved.  He is now off of his recommended 21-day course of clopidogrel.  He notes that he has a echocardiogram with bubble study scheduled with [facility], but I will need to follow-up on if this was completed when I see him for his annual wellness visit in 3 weeks.  Medications refilled as noted above to cover him until his wellness visit.  The longitudinal plan of care for the diagnosis(es)/condition(s) as documented were addressed during this visit. Due to the added  complexity in care, I will continue to support Brian in the subsequent management and with ongoing continuity of care.      Possible Sleep Apnea:        8/2/2024     9:36 AM   STOP-Bang Total Score   Total Score 3   Risk Stratification 3 - 4: Moderate Risk for SABAS           - No identified additional risk factors other than previously addressed    Preoperative Medication Instructions  Antiplatelet or Anticoagulation Medication Instructions   - Patient is on no antiplatelet or anticoagulation medications.    Additional Medication Instructions  Take all scheduled medications on the day of surgery    Recommendation  Approval given to proceed with proposed procedure, without further diagnostic evaluation.    Clau Torres is a 38 year old, presenting for the following:  Pre-Op Exam (endoscopy)          8/2/2024     8:46 AM   Additional Questions   Roomed by June BARRIENTOS related to upcoming procedure: history of GERD, recommended for EGD, need clearance for higher level of sedation.    Of note, patient was recently hospitalized for observation for strokelike symptoms.  Question of a TIA.  Patient was placed on clopidogrel for 21 days and has completed that course.  He had resolution of his weakness symptoms and facial numbness symptoms.  His atorvastatin was recommended to be increased to 40 mg daily.  Needs repeat lipid panel in a few weeks.  He has further workup planned with a echocardiogram with bubble study and a neurology follow-up with [facility].        8/2/2024   Pre-Op Questionnaire   Have you ever had a heart attack or stroke? No   Have you ever had surgery on your heart or blood vessels, such as a stent placement, a coronary artery bypass, or surgery on an artery in your head, neck, heart, or legs? No   Do you have chest pain with activity? No   Do you have a history of heart failure? No   Do you currently have a cold, bronchitis or symptoms of other infection? No   Do you have a cough, shortness of  breath, or wheezing? (!) YES:  after walking.  No chest pressure.  Ongoing issue and not worsening.   Do you or anyone in your family have previous history of blood clots? (!) UNKNOWN no personal history.   Do you or does anyone in your family have a serious bleeding problem such as prolonged bleeding following surgeries or cuts? (!) UNKNOWN no person history    Have you ever had problems with anemia or been told to take iron pills? No   Have you had any abnormal blood loss such as black, tarry or bloody stools? No   Have you ever had a blood transfusion? (!) UNKNOWN   Are you willing to have a blood transfusion if it is medically needed before, during, or after your surgery? Yes   Have you or any of your relatives ever had problems with anesthesia? No   Do you have sleep apnea, excessive snoring or daytime drowsiness? (!) YES   Do you have a CPAP machine? (!) NO    Do you have any artifical heart valves or other implanted medical devices like a pacemaker, defibrillator, or continuous glucose monitor? No   Do you have artificial joints? No   Are you allergic to latex? No        Health Care Directive  Patient does not have a Health Care Directive or Living Will: Discussed advance care planning with patient; information given to patient to review.    Preoperative Review of    reviewed - no record of controlled substances prescribed.      Status of Chronic Conditions:  HYPOTHYROIDISM - Patient has a longstanding history of chronic Hypothyroidism. Patient has been doing well, noting no tremor, insomnia, hair loss or changes in skin texture. Continues to take medications as directed, without adverse reactions or side effects. Last TSH   Lab Results   Component Value Date    TSH 2.63 09/15/2022   .      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 Provider to review      Scoliosis associated with other condition 01/14/2005     Priority: Medium    Hearing loss 01/14/2005     Priority: Medium     Problem list name updated by automated process. Provider to review      Multiple congenital malformations, not elsewhere classified 01/14/2005     Priority: Medium     Diagnosis updated by automated process. Provider to review and confirm.        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  Bilateral sagittal split ramus osteotomy.  [facility]    ZZHC CREATE EARDRUM OPENING,GEN ANESTH      P.E. Tubes     Current Outpatient Medications   Medication Sig Dispense Refill    atorvastatin (LIPITOR) 20  "MG tablet TAKE 1 TABLET BY MOUTH EVERY NIGHT AT BEDTIME 90 tablet 0    cetirizine (ZYRTEC) 10 MG tablet Take 10 mg by mouth daily      FANAPT 2 MG TABS tablet Take 2 mg by mouth      FANAPT 6 MG TABS tablet Take 6 mg by mouth At night      hydrOXYzine HCl (ATARAX) 25 MG tablet Take 25 mg by mouth      levothyroxine (SYNTHROID/LEVOTHROID) 50 MCG tablet TAKE 1 TABLET BY MOUTH DAILY 90 tablet 0    pantoprazole (PROTONIX) 40 MG EC tablet TAKE 1 TABLET BY MOUTH DAILY 30 tablet 2    sertraline (ZOLOFT) 100 MG tablet Take 150 mg by mouth daily      clopidogrel (PLAVIX) 75 MG tablet Take 75 mg by mouth (Patient not taking: Reported on 8/2/2024)         Allergies   Allergen Reactions    Zyprexa [Olanzapine]         Social History     Tobacco Use    Smoking status: Never    Smokeless tobacco: Never   Substance Use Topics    Alcohol use: No     Alcohol/week: 0.0 standard drinks of alcohol       History   Drug Use No               Objective    /78   Pulse 99   Temp 97.6  F (36.4  C) (Temporal)   Resp 14   Ht 1.614 m (5' 3.54\")   Wt 81.3 kg (179 lb 3.2 oz)   SpO2 98%   BMI 31.20 kg/m     Estimated body mass index is 31.2 kg/m  as calculated from the following:    Height as of this encounter: 1.614 m (5' 3.54\").    Weight as of this encounter: 81.3 kg (179 lb 3.2 oz).  Physical Exam  GENERAL: alert and no distress  EYES: Eyes grossly normal to inspection, PERRL and conjunctivae and sclerae normal  HENT: ear canals and TM's normal, nose and mouth without ulcers or lesions  NECK: no adenopathy, no asymmetry, masses, or scars  RESP: lungs clear to auscultation - no rales, rhonchi or wheezes  CV: regular rate and rhythm, normal S1 S2, no S3 or S4, no murmur, click or rub, no peripheral edema  ABDOMEN: soft, nontender, no hepatosplenomegaly, no masses and bowel sounds normal  MS: no gross musculoskeletal defects noted, no edema  SKIN: no suspicious lesions or rashes  NEURO: Normal strength and tone, mentation intact and " speech normal  PSYCH: mentation appears normal, affect normal/bright    Recent Labs   Lab Test 09/13/23  1552   CR 1.1        Diagnostics  Labs ordered for future need, but not required for preoperative clearance   No EKG required, no history of coronary heart disease, significant arrhythmia, peripheral arterial disease or other structural heart disease.    Revised Cardiac Risk Index (RCRI)  The patient has the following serious cardiovascular risks for perioperative complications:   - No serious cardiac risks = 0 points     RCRI Interpretation: 0 points: Class I (very low risk - 0.4% complication rate)         Signed Electronically by: Olu Joseph MD  A copy of this evaluation report is provided to the requesting physician.

## 2024-08-02 NOTE — PATIENT INSTRUCTIONS

## 2024-08-05 ENCOUNTER — ANESTHESIA EVENT (OUTPATIENT)
Dept: GASTROENTEROLOGY | Facility: CLINIC | Age: 39
End: 2024-08-05
Payer: MEDICAID

## 2024-08-05 NOTE — H&P
[facility] Anesthesia Pre-op History and Physical    Brian Ortiz MRN# [id number]   Age: 38 year old YOB: 1985      Date of Surgery: 8/6/2024 Location [facility]      Date of Exam 8/6/2024 Facility (In hospital)       Home clinic: [facility]  Primary care provider: Olu Joseph         Chief Complaint and/or Reason for Procedure:   No chief complaint on file.    Egd. 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 Provider to review      Scoliosis associated with other condition 01/14/2005     Priority: Medium    Hearing loss 01/14/2005     Priority: Medium     Problem list name updated by automated process. Provider to review      Multiple congenital malformations, not elsewhere classified 01/14/2005     Priority: Medium     Diagnosis updated by automated process. Provider to review and  confirm.              Medications (include herbals and vitamins):   Any Plavix use in the last 7 days? No     No current facility-administered medications for this encounter.     Current Outpatient Medications   Medication Sig Dispense Refill    cetirizine (ZYRTEC) 10 MG tablet Take 10 mg by mouth daily      FANAPT 2 MG TABS tablet Take 2 mg by mouth      FANAPT 6 MG TABS tablet Take 6 mg by mouth At night      hydrOXYzine HCl (ATARAX) 25 MG tablet Take 25 mg by mouth      pantoprazole (PROTONIX) 40 MG EC tablet TAKE 1 TABLET BY MOUTH DAILY 30 tablet 2    sertraline (ZOLOFT) 100 MG tablet Take 150 mg by mouth daily      atorvastatin (LIPITOR) 20 MG tablet Take 1 tablet (20 mg) by mouth at bedtime 90 tablet 0    levothyroxine (SYNTHROID/LEVOTHROID) 50 MCG tablet Take 1 tablet (50 mcg) by mouth daily 90 tablet 0             Allergies:      Allergies   Allergen Reactions    Zyprexa [Olanzapine]      Allergy to Latex? No  Allergy to tape?   No  Intolerances:             Physical Exam:   All vitals have been reviewed  No data found.  No intake/output data recorded.  Lungs:   No increased work of breathing, good air exchange, clear to auscultation bilaterally, no crackles or wheezing     Cardiovascular:   Normal apical impulse, regular rate and rhythm, normal S1 and S2, no S3 or S4, and no murmur noted             Lab / Radiology Results:            Anesthetic risk and/or ASA classification:       Elijah Dennison MD

## 2024-08-06 ENCOUNTER — HOSPITAL ENCOUNTER (OUTPATIENT)
Facility: CLINIC | Age: 39
Discharge: HOME | End: 2024-08-06
Attending: INTERNAL MEDICINE | Admitting: INTERNAL MEDICINE
Payer: MEDICAID

## 2024-08-06 ENCOUNTER — ANESTHESIA (OUTPATIENT)
Dept: GASTROENTEROLOGY | Facility: CLINIC | Age: 39
End: 2024-08-06
Payer: MEDICAID

## 2024-08-06 VITALS
TEMPERATURE: 98.4 F | RESPIRATION RATE: 16 BRPM | HEART RATE: 112 BPM | OXYGEN SATURATION: 95 % | DIASTOLIC BLOOD PRESSURE: 47 MMHG | SYSTOLIC BLOOD PRESSURE: 112 MMHG

## 2024-08-06 LAB — UPPER GI ENDOSCOPY: NORMAL

## 2024-08-06 PROCEDURE — 43239 EGD BIOPSY SINGLE/MULTIPLE: CPT | Performed by: INTERNAL MEDICINE

## 2024-08-06 PROCEDURE — 258N000003 HC RX IP 258 OP 636: Performed by: NURSE ANESTHETIST, CERTIFIED REGISTERED

## 2024-08-06 PROCEDURE — 370N000017 HC ANESTHESIA TECHNICAL FEE, PER MIN: Performed by: INTERNAL MEDICINE

## 2024-08-06 PROCEDURE — 88305 TISSUE EXAM BY PATHOLOGIST: CPT | Mod: 26 | Performed by: PATHOLOGY

## 2024-08-06 PROCEDURE — 88305 TISSUE EXAM BY PATHOLOGIST: CPT | Mod: TC | Performed by: INTERNAL MEDICINE

## 2024-08-06 PROCEDURE — 250N000011 HC RX IP 250 OP 636: Performed by: NURSE ANESTHETIST, CERTIFIED REGISTERED

## 2024-08-06 PROCEDURE — 250N000009 HC RX 250: Performed by: NURSE ANESTHETIST, CERTIFIED REGISTERED

## 2024-08-06 RX ORDER — LIDOCAINE 40 MG/G
CREAM TOPICAL
Status: DISCONTINUED | OUTPATIENT
Start: 2024-08-06 | End: 2024-08-06 | Stop reason: HOSPADM

## 2024-08-06 RX ORDER — ONDANSETRON 4 MG/1
4 TABLET, ORALLY DISINTEGRATING ORAL EVERY 30 MIN PRN
Status: DISCONTINUED | OUTPATIENT
Start: 2024-08-06 | End: 2024-08-06 | Stop reason: HOSPADM

## 2024-08-06 RX ORDER — DEXAMETHASONE SODIUM PHOSPHATE 10 MG/ML
4 INJECTION, SOLUTION INTRAMUSCULAR; INTRAVENOUS
Status: DISCONTINUED | OUTPATIENT
Start: 2024-08-06 | End: 2024-08-06 | Stop reason: HOSPADM

## 2024-08-06 RX ORDER — NALOXONE HYDROCHLORIDE 0.4 MG/ML
0.1 INJECTION, SOLUTION INTRAMUSCULAR; INTRAVENOUS; SUBCUTANEOUS
Status: DISCONTINUED | OUTPATIENT
Start: 2024-08-06 | End: 2024-08-06 | Stop reason: HOSPADM

## 2024-08-06 RX ORDER — LIDOCAINE HYDROCHLORIDE 10 MG/ML
INJECTION, SOLUTION INFILTRATION; PERINEURAL PRN
Status: DISCONTINUED | OUTPATIENT
Start: 2024-08-06 | End: 2024-08-06

## 2024-08-06 RX ORDER — PROPOFOL 10 MG/ML
INJECTION, EMULSION INTRAVENOUS CONTINUOUS PRN
Status: DISCONTINUED | OUTPATIENT
Start: 2024-08-06 | End: 2024-08-06

## 2024-08-06 RX ORDER — ONDANSETRON 2 MG/ML
4 INJECTION INTRAMUSCULAR; INTRAVENOUS EVERY 30 MIN PRN
Status: DISCONTINUED | OUTPATIENT
Start: 2024-08-06 | End: 2024-08-06 | Stop reason: HOSPADM

## 2024-08-06 RX ORDER — SODIUM CHLORIDE, SODIUM LACTATE, POTASSIUM CHLORIDE, CALCIUM CHLORIDE 600; 310; 30; 20 MG/100ML; MG/100ML; MG/100ML; MG/100ML
INJECTION, SOLUTION INTRAVENOUS CONTINUOUS PRN
Status: DISCONTINUED | OUTPATIENT
Start: 2024-08-06 | End: 2024-08-06

## 2024-08-06 RX ORDER — SODIUM CHLORIDE, SODIUM LACTATE, POTASSIUM CHLORIDE, CALCIUM CHLORIDE 600; 310; 30; 20 MG/100ML; MG/100ML; MG/100ML; MG/100ML
INJECTION, SOLUTION INTRAVENOUS CONTINUOUS
Status: DISCONTINUED | OUTPATIENT
Start: 2024-08-06 | End: 2024-08-06 | Stop reason: HOSPADM

## 2024-08-06 RX ADMIN — PROPOFOL 100 MG: 10 INJECTION, EMULSION INTRAVENOUS at 13:21

## 2024-08-06 RX ADMIN — SODIUM CHLORIDE, POTASSIUM CHLORIDE, SODIUM LACTATE AND CALCIUM CHLORIDE: 600; 310; 30; 20 INJECTION, SOLUTION INTRAVENOUS at 12:47

## 2024-08-06 RX ADMIN — PROPOFOL 250 MCG/KG/MIN: 10 INJECTION, EMULSION INTRAVENOUS at 13:20

## 2024-08-06 RX ADMIN — LIDOCAINE HYDROCHLORIDE 50 MG: 10 INJECTION, SOLUTION INFILTRATION; PERINEURAL at 13:20

## 2024-08-06 RX ADMIN — SODIUM CHLORIDE, POTASSIUM CHLORIDE, SODIUM LACTATE AND CALCIUM CHLORIDE: 600; 310; 30; 20 INJECTION, SOLUTION INTRAVENOUS at 13:19

## 2024-08-06 RX ADMIN — PROPOFOL 50 MG: 10 INJECTION, EMULSION INTRAVENOUS at 13:29

## 2024-08-06 RX ADMIN — PROPOFOL 50 MG: 10 INJECTION, EMULSION INTRAVENOUS at 13:26

## 2024-08-06 ASSESSMENT — ACTIVITIES OF DAILY LIVING (ADL)
ADLS_ACUITY_SCORE: 35
ADLS_ACUITY_SCORE: 35

## 2024-08-06 NOTE — ANESTHESIA CARE TRANSFER NOTE
Patient: Brian Ortiz    Procedure: Procedure(s):  ESOPHAGOGASTRODUODENOSCOPY, WITH BIOPSY       Diagnosis: Regurgitation of food [R11.10]  Gastroesophageal reflux disease, unspecified whether esophagitis present [K21.9]  Epigastric pain [R10.13]  Burping [R14.2]  Diagnosis Additional Information: No value filed.    Anesthesia Type:   MAC     Note:    Oropharynx: oropharynx clear of all foreign objects and spontaneously breathing  Level of Consciousness: drowsy  Oxygen Supplementation: room air    Independent Airway: airway patency satisfactory and stable  Dentition: dentition unchanged  Vital Signs Stable: post-procedure vital signs reviewed and stable  Report to RN Given: handoff report given  Patient transferred to: Phase II    Handoff Report: Identifed the Patient, Identified the Reponsible Provider, Reviewed the pertinent medical history, Discussed the surgical course, Reviewed Intra-OP anesthesia mangement and issues during anesthesia, Set expectations for post-procedure period and Allowed opportunity for questions and acknowledgement of understanding  Vitals:  Vitals Value Taken Time   BP 72/56 08/06/24 1345   Temp     Pulse 110 08/06/24 1345   Resp 12 08/06/24 1340   SpO2 94 % 08/06/24 1352   Vitals shown include unfiled device data.    Electronically Signed By: DANA Yu CRNA  August 6, 2024  1:54 PM

## 2024-08-06 NOTE — LETTER
August 12, 2024      Brian Ortiz  [address]  [place] [facility] [address]        Dear ,    We are writing to inform you of your test results.    Your test results fall within the expected range(s) or remain unchanged from previous results.  Please continue with current treatment plan.    Resulted Orders   Surgical Pathology Exam   Result Value Ref Range    Case Report       Surgical Pathology Report                         Case: [id number]                                  Authorizing Provider:  Elijah Dennison MD        Collected:           08/06/2024 01:30 PM          Ordering Location:     [facility]          Received:            08/06/2024 03:34 PM                                 [facility]                                                          Pathologist:           Alanna Marie MD                                                          Specimen:    Stomach, Body, Gastric Biopsies R/O H Pylori                                               Final Diagnosis       A.  Stomach, body, biopsy:  - Oxyntic type gastric mucosa with mild chronic inflammation.  - Negative for H. Pylori organisms on routine stains.  - Negative for intestinal metaplasia.   -Negative for dysplasia or malignancy          Clinical Information       Procedure:  ESOPHAGOGASTRODUODENOSCOPY, WITH BIOPSY  Pre-op Diagnosis: Regurgitation of food [R11.10]  Gastroesophageal reflux disease, unspecified whether esophagitis present [K21.9]  Epigastric pain [R10.13]  Burping [R14.2]  Post-op Diagnosis: R11.10 - Regurgitation of food [ICD-10-CM]  K21.9 - Gastroesophageal reflux disease, unspecified whether esophagitis present [ICD-10-CM]  R10.13 - Epigastric pain [ICD-10-CM]  R14.2 - Burping [ICD-10-CM]      Gross Description       A(1). Stomach, Body, Gastric Biopsies R/O H Pylori:  The specimen is received in formalin, labeled with the patient's name, medical record number and other identifying information and  designated  gastric biopsies . It consists of 2 tan soft tissue fragments ranging from 0.2-0.3 cm. Entirely submitted in one cassette.   (JOHN Shell)8/7/2024 7:41 AM       Microscopic Description       Microscopic examination was performed.      Performing Labs       The technical component of this testing was completed at [facility].    Stain controls for all stains resulted within this report have been reviewed and show appropriate reactivity.       Case Images         If you have any questions or concerns, please call the clinic at the number listed above.       Sincerely,      Elijah Dennison MD

## 2024-08-06 NOTE — ANESTHESIA POSTPROCEDURE EVALUATION
Patient: Brian Ortiz    Procedure: Procedure(s):  ESOPHAGOGASTRODUODENOSCOPY, WITH BIOPSY       Anesthesia Type:  MAC    Note:  Disposition: Outpatient   Postop Pain Control: Uneventful            Sign Out: Well controlled pain   PONV: No   Neuro/Psych: Uneventful            Sign Out: Acceptable/Baseline neuro status   Airway/Respiratory: Uneventful            Sign Out: Acceptable/Baseline resp. status   CV/Hemodynamics: Uneventful            Sign Out: Acceptable CV status   Other NRE: NONE   DID A NON-ROUTINE EVENT OCCUR? No    Event details/Postop Comments:  Pt was happy with anesthesia care.  No complications.  I will follow up with the pt if needed.       Last vitals:  Vitals Value Taken Time   /74 08/06/24 1405   Temp     Pulse 99 08/06/24 1405   Resp 16 08/06/24 1400   SpO2 94 % 08/06/24 1352   Vitals shown include unfiled device data.    Electronically Signed By: DANA Yu CRNA  August 6, 2024  2:10 PM

## 2024-08-06 NOTE — ANESTHESIA PREPROCEDURE EVALUATION
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  Bilateral sagittal split ramus osteotomy.  [facility]     ZZHC CREATE EARDRUM OPENING,GEN ANESTH      P.E. Tubes      Allergies   Allergen Reactions     Zyprexa [Olanzapine]       Social History     Tobacco Use     Smoking status: Never     Smokeless tobacco: Never   Substance Use Topics     Alcohol use: No     Alcohol/week: 0.0 standard drinks of alcohol      Wt Readings from Last 1 Encounters:   24 81.3 kg (179 lb 3.2 oz)        Anesthesia Evaluation   Pt has had prior anesthetic. Type: General.        ROS/MED HX  ENT/Pulmonary:       Neurologic: Comment: Vertigo    Mild intellectual disabilities      Cardiovascular:     (+)  hypertension- -   -  - -                                 Previous cardiac testing   Echo: Date: 2023 Results:  Hyperdynamic left ventricular function  The visual ejection fraction is >70%.  The left ventricle is normal in size.  The right ventricle is normal in structure, function and size.  Doppler interrogation does not demonstrate signficant stenosis or  insufficiency involving cardiac valves     No change since  12/1/2022  ______________________________________________________________________________  Left Ventricle  The left ventricle is normal in size. There is normal left ventricular wall  thickness. Hyperdynamic left ventricular function. The visual ejection  fraction is >70%. Left ventricular diastolic function is normal. No regional  wall motion abnormalities noted. There is no thrombus seen in the left  ventricle.     Right Ventricle  The right ventricle is normal in structure, function and size. There is no  mass or thrombus in the right ventricle.     Atria  Normal left atrial size. Right atrial size is normal. There is no atrial shunt  seen. The left atrial appendage is not well visualized.     Mitral Valve  The mitral valve is normal in structure and function. There is no mitral  regurgitation noted. There is no mitral valve stenosis.     Tricuspid Valve  Normal tricuspid valve. The right ventricular systolic pressure is  approximated at 22.3 mmHg plus the right atrial pressure. Right ventricle  systolic pressure estimate normal. There is mild (1+) tricuspid regurgitation.  There is no tricuspid stenosis.     Aortic Valve  The aortic valve is trileaflet. No aortic regurgitation is present. No aortic  stenosis is present.     Pulmonic Valve  Normal pulmonic valve. There is no pulmonic valvular regurgitation. There is  no pulmonic valvular stenosis.     Vessels  The aortic root is normal size. Normal size ascending aorta. The inferior vena  cava is normal. The pulmonary artery is normal size.     Pericardium  No pericardial thickening. There is no pleural effusion.     Rhythm  Sinus rhythm was noted.    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      GI/Hepatic: Comment: Constipation     (+)  "GERD, Symptomatic,                  Renal/Genitourinary: Comment: Benign non-nodular prostatic hyperplasia with lower urinary tract symptoms    congenital anomaly of kidney      Endo:     (+)          thyroid problem, hypothyroidism,    Obesity,       Psychiatric/Substance Use: Comment: ADHD    (+) psychiatric history schizophrenia and depression       Infectious Disease:  - neg infectious disease ROS     Malignancy:  - neg malignancy ROS     Other:            Physical Exam    Airway  airway exam normal      Mallampati: III   TM distance: > 3 FB   Neck ROM: full   Mouth opening: > 3 cm    Respiratory Devices and Support         Dental  no notable dental history         Cardiovascular   cardiovascular exam normal       Rhythm and rate: regular and normal     Pulmonary   pulmonary exam normal        breath sounds clear to auscultation       OUTSIDE LABS:  CBC:   Lab Results   Component Value Date    WBC 13.6 (H) 03/02/2023    WBC 8.3 08/21/2022    HGB 15.0 03/02/2023    HGB 14.0 08/21/2022    HCT 44.1 03/02/2023    HCT 40.8 08/21/2022     03/02/2023     08/21/2022     BMP:   Lab Results   Component Value Date     03/02/2023     08/21/2022    POTASSIUM 3.7 03/02/2023    POTASSIUM 4.0 08/21/2022    CHLORIDE 103 03/02/2023    CHLORIDE 110 (H) 08/21/2022    CO2 23 03/02/2023    CO2 23 08/21/2022    BUN 12.5 03/02/2023    BUN 13 08/21/2022    CR 1.1 09/13/2023    CR 1.11 03/02/2023     (H) 03/02/2023     (H) 08/21/2022     COAGS: No results found for: \"PTT\", \"INR\", \"FIBR\"  POC: No results found for: \"BGM\", \"HCG\", \"HCGS\"  HEPATIC:   Lab Results   Component Value Date    ALBUMIN 3.9 03/02/2023    PROTTOTAL 7.1 03/02/2023    ALT 45 03/02/2023    AST 49 03/02/2023    ALKPHOS 94 03/02/2023    BILITOTAL 0.4 03/02/2023     OTHER:   Lab Results   Component Value Date    LACT 1.0 05/30/2021    A1C 5.7 (H) 11/04/2022    WHIT 9.3 03/02/2023    LIPASE 16 03/02/2023    TSH 2.63 09/15/2022    CRP " "128.0 (H) 05/30/2021    SED 4 06/24/2015       Anesthesia Plan    ASA Status:  3    NPO Status:  NPO Appropriate    Anesthesia Type: MAC.     - Reason for MAC: straight local not clinically adequate   Induction: Intravenous, Propofol.   Maintenance: TIVA.        Consents    Anesthesia Plan(s) and associated risks, benefits, and realistic alternatives discussed. Questions answered and patient/representative(s) expressed understanding.     - Discussed:     - Discussed with:  Patient      - Extended Intubation/Ventilatory Support Discussed: No.      - Patient is DNR/DNI Status: No     Use of blood products discussed: No .     Postoperative Care    Pain management: Oral pain medications.   PONV prophylaxis: Background Propofol Infusion     Comments:    Other Comments: The risks and benefits of anesthesia, and the alternatives where applicable, have been discussed with the patient, and they wish to proceed.            DANA Yu CRNA    I have reviewed the pertinent notes and labs in the chart from the past 30 days and (re)examined the patient.  Any updates or changes from those notes are reflected in this note.              # Obesity: Estimated body mass index is 31.2 kg/m  as calculated from the following:    Height as of 8/2/24: 1.614 m (5' 3.54\").    Weight as of 8/2/24: 81.3 kg (179 lb 3.2 oz).      "

## 2024-08-06 NOTE — DISCHARGE INSTRUCTIONS
[facility]    Home Care Following Endoscopy          Activity:  You have just undergone an endoscopic procedure usually performed with conscious sedation.  Do not work or operate machinery (including a car) for at least 12 hours.    I encourage you to walk and attempt to pass this air as soon as possible.    Diet:  Return to the diet you were on before your procedure but eat lightly for the first 12-24 hours.  Drink plenty of water.  Resume any regular medications unless otherwise advised by your physician.  Please begin any new medication prescribed as a result of your procedure as directed by your physician.   If you had any biopsy or polyp removed please refrain from aspirin or aspirin products for 2 days.  If on Coumadin please restart as instructed by your physician.   Pain:  You may take Tylenol as needed for pain.  Expected Recovery:  You can expect some mild abdominal fullness and/or discomfort due to the air used to inflate your intestinal tract. It is also normal to have a mild sore throat after upper endoscopy.    Call Your Physician if You Have:  After Upper Endoscopy:  Shoulder, back or chest pain.  Difficulty breathing or swallowing.  Vomiting blood.  After Colonoscopy:  Worsening persisting abdominal pain which is worse with activity.  Fevers (>101 degrees F), chills or shakes.  Passage of continued blood with bowel movements.     Any questions or concerns about your recovery, please call [phone] or after hours [phone] ([phone]) Nurse Advice Line.    Follow-up Care:  You did have polyps/biopsy tissue sample(s) removed.  The polyps/biopsy tissue sample(s) will be sent to pathology.    You should receive letter in your My Chart from dr Dennison with your results within 1-2 weeks. If you do not participate in My Chart a physical letter will come in the mail in 2-3 weeks.  Please call if you have not received a notification of your results.  If asked to return to clinic please  make an appointment 1 week after your procedure.  Call [phone].   Dr Dennison

## 2024-08-09 LAB
PATH REPORT.COMMENTS IMP SPEC: NORMAL
PATH REPORT.COMMENTS IMP SPEC: NORMAL
PATH REPORT.FINAL DX SPEC: NORMAL
PATH REPORT.GROSS SPEC: NORMAL
PATH REPORT.MICROSCOPIC SPEC OTHER STN: NORMAL
PATH REPORT.RELEVANT HX SPEC: NORMAL
PHOTO IMAGE: NORMAL

## 2024-08-23 ENCOUNTER — MYC MEDICAL ADVICE (OUTPATIENT)
Dept: FAMILY MEDICINE | Facility: CLINIC | Age: 39
End: 2024-08-23

## 2024-09-03 ENCOUNTER — LAB (OUTPATIENT)
Dept: LAB | Facility: CLINIC | Age: 39
End: 2024-09-03
Payer: MEDICAID

## 2024-09-03 DIAGNOSIS — E03.9 HYPOTHYROIDISM, UNSPECIFIED TYPE: ICD-10-CM

## 2024-09-03 DIAGNOSIS — E78.5 HYPERLIPIDEMIA LDL GOAL <130: ICD-10-CM

## 2024-09-03 LAB
CHOLEST SERPL-MCNC: 179 MG/DL
FASTING STATUS PATIENT QL REPORTED: YES
HDLC SERPL-MCNC: 55 MG/DL
LDLC SERPL CALC-MCNC: 99 MG/DL
NONHDLC SERPL-MCNC: 124 MG/DL
TRIGL SERPL-MCNC: 123 MG/DL
TSH SERPL DL<=0.005 MIU/L-ACNC: 2.01 UIU/ML (ref 0.3–4.2)

## 2024-09-03 PROCEDURE — 36415 COLL VENOUS BLD VENIPUNCTURE: CPT

## 2024-09-03 PROCEDURE — 84443 ASSAY THYROID STIM HORMONE: CPT

## 2024-09-03 PROCEDURE — 80061 LIPID PANEL: CPT

## 2024-09-05 DIAGNOSIS — K21.00 GASTROESOPHAGEAL REFLUX DISEASE WITH ESOPHAGITIS, UNSPECIFIED WHETHER HEMORRHAGE: ICD-10-CM

## 2024-09-06 RX ORDER — PANTOPRAZOLE SODIUM 40 MG/1
40 TABLET, DELAYED RELEASE ORAL DAILY
Qty: 90 TABLET | Refills: 2 | Status: SHIPPED | OUTPATIENT
Start: 2024-09-06

## 2024-09-17 ENCOUNTER — MYC MEDICAL ADVICE (OUTPATIENT)
Dept: FAMILY MEDICINE | Facility: CLINIC | Age: 39
End: 2024-09-17
Payer: MEDICAID

## 2024-09-17 DIAGNOSIS — H90.3 SENSORINEURAL HEARING LOSS (SNHL) OF BOTH EARS: Primary | ICD-10-CM

## 2024-09-21 NOTE — PROGRESS NOTES
[address] [address]  Phone: [phone]    Patient:  Brian Ortiz, Date of birth 1985    Referring Provider: Mamie Warren       Gastroenterology CLINIC VISIT, RETURN PATIENT    REASON FOR CONSULTATION: follow up    HPI: 38 year old male presented to GI clinic for a follow up. PMH of hyperlipidemia, HTN, and schizophrenia.The patient was seen for GERD symptoms.  Patient complained of ongoing painful acid regurgitation into his mouth, burping, hiccups, and cough.  He reported upper abdominal pain, not related to defecation or meal intake.  Denied any problems with bowel elimination. Those are ongoing symptoms for the last 1 year.    Was referred to EGD and abdominal ultrasound. Started on pantoprazole. Patient reported improvement in acid reflux. At times, wakes up at night from acid regurgitation and gasping for air. Asks if he could have sleep apnea. Denies nausea or vomiting. No abdominal pain, but reported some bloating. Complains of irregular stool pattern-some days, has a few soft or loose stools; then, skips BM for a few days. Denies hematochezia or melena.Had formed stool this morning.      PREVIOUS ENDOSCOPY:  8/6/2024 EGD by Dr. Dennison  Findings:       The Z-line was regular and was found 35 cm from the incisors.        The exam of the esophagus was otherwise normal.        The gastroesophageal flap valve was visualized endoscopically and        classified as Hill Grade II (fold present, opens with respiration).        The entire examined stomach was normal. Biopsies were taken with a cold        forceps for histology.        Multiple small semi-sessile polyps with no stigmata of recent bleeding        were found in the gastric body.        The examined duodenum was normal.   Final Diagnosis   A.  Stomach, body, biopsy:  - Oxyntic type gastric mucosa with mild chronic inflammation.  - Negative for H. Pylori organisms on routine  stains.  - Negative for intestinal metaplasia.   -Negative for dysplasia or malignancy       PERTINENT STUDIES Reviewed in EMR  7/15/2024 Abdominal ultrasound  FINDINGS:    Gallbladder: Normal with no cholelithiasis, wall thickening or focal  tenderness.     Bile ducts:  CHD is normal diameter.  No intrahepatic biliary  dilatation.   Liver: Fatty liver. No focal lesion identified.   Pancreas:  Partially obscured by overlying bowel gas,  but grossly  unremarkable.    Spleen:  Normal.    Right kidney:  Absent.   Left kidney:  Normal.   Aorta and IVC:  Not specifically assessed.                                                                  IMPRESSION:    1. No acute abnormality.  2. Fatty liver.    3/2/2023 CT scan of abdomen and pelvis  FINDINGS:   LOWER CHEST: Normal.     HEPATOBILIARY: No significant mass or bile duct dilatation. No calcified gallstones.      PANCREAS: Normal.     SPLEEN: Normal.     ADRENAL GLANDS: Normal.     KIDNEYS/BLADDER: No significant mass, stone, or hydronephrosis.     BOWEL: Moderate distention of the stomach with fluid. Remainder of the bowel is unremarkable without evidence of obstruction or inflammatory change. Normal appendix.     LYMPH NODES: Normal.     VASCULATURE: Unremarkable.     PELVIC ORGANS: Normal.     MUSCULOSKELETAL: Normal.                                                                   IMPRESSION:   1.  Moderate gastric distention. Otherwise, no acute process in the abdomen or pelvis.    ROS: 10pt ROS performed and otherwise negative.    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  Bilateral sagittal split ramus osteotomy.  [facility]    ZZHC CREATE EARDRUM OPENING,GEN ANESTH      P.E. Tubes         PERTINENT MEDICATIONS:  Current Outpatient Medications   Medication Sig Dispense Refill    atorvastatin (LIPITOR) 20 MG tablet Take 1 tablet (20 mg) by mouth at bedtime 90 tablet 0    cetirizine (ZYRTEC) 10 MG tablet Take 10 mg by mouth daily      FANAPT 2 MG TABS tablet Take 2 mg by mouth      FANAPT 6 MG TABS tablet Take 6 mg by mouth At night      hydrOXYzine HCl (ATARAX) 25 MG tablet Take 25 mg by mouth      levothyroxine (SYNTHROID/LEVOTHROID) 50 MCG tablet Take 1 tablet (50 mcg) by mouth daily 90 tablet 0    methylcellulose (CITRUCEL) 500 MG TABS tablet Take 2 tablets (1,000 mg) by mouth 2 times daily. Start with one tablet at supper and increase the dose by one tablet weekly 120 tablet 2    pantoprazole (PROTONIX) 40 MG EC tablet Take 1 tablet (40 mg) by mouth daily. 90 tablet 2    sertraline (ZOLOFT) 100 MG tablet Take 150 mg by mouth daily           SOCIAL HISTORY:  Social History     Socioeconomic History    Marital status: Single     Spouse name: Not on file    Number of children: Not on file    Years of education: Not on file    Highest education level: Not on file   Occupational History    Not on file   Tobacco Use    Smoking status: Never    Smokeless tobacco: Never   Vaping Use    Vaping status: Never Used   Substance and Sexual Activity    Alcohol use: No     Alcohol/week: 0.0 standard drinks of alcohol    Drug use: No    Sexual activity: Never   Other Topics Concern     "Parent/sibling w/ CABG, MI or angioplasty before 65F 55M? Not Asked   Social History Narrative    Not on file     Social Determinants of Health     Financial Resource Strain: Not on file   Food Insecurity: No Food Insecurity (7/4/2024)    [facility]    Hunger Vital Sign     Worried About Running Out of Food in the Last Year: Never true     Ran Out of Food in the Last Year: Never true   Transportation Needs: No Transportation Needs (7/4/2024)    [facility]    Transportation Needs     In the past 12 months, has lack of transportation kept you from medical appointments, meetings, work, or from getting medicines or things needed for daily living?: No   Physical Activity: Not on file   Stress: Not on file   Social Connections: Not on file   Interpersonal Safety: High Risk (8/2/2024)    Interpersonal Safety     Do you feel physically and emotionally safe where you currently live?: No     Within the past 12 months, have you been hit, slapped, kicked or otherwise physically hurt by someone?: No     Within the past 12 months, have you been humiliated or emotionally abused in other ways by your partner or ex-partner?: No   Housing Stability: Low Risk  (7/4/2024)    [facility]    Housing Stability Vital Sign     Unable to Pay for Housing in the Last Year: No     Number of Times Moved in the Last Year: 0     Homeless in the Last Year: No       FAMILY HISTORY:  Denies colon/panc/esophageal/other GI CA, no other Gracia or other HPS-related Chavez. No IBD/celiac, no other AI/liver/thyroid disease.    Family History   Adopted: Yes   Problem Relation Age of Onset    Substance Abuse Mother     Substance Abuse Father        PHYSICAL EXAMINATION:  Vitals reviewed  /56 (BP Location: Right arm, Patient Position: Sitting, Cuff Size: Adult Regular)   Pulse 84   Ht 1.614 m (5' 3.54\")   Wt 78.9 kg (174 lb)   BMI 30.30 kg/m  "     General: Patient appears well in no acute distress.    Skin: No visualized rash or lesions on visualized skin  HEENT:    EOMI, no erythema, sclera icterus or discharge noted.  Mouth mucosa intact, pink, moist  No cervical or supraclavicular lymphadenopathy. Thyroid gland not enlarged.  Resp: breathing comfortably without accessory muscle usage, speaking in full sentences, no cough. Lung sounds clear  Card: Regular and rhythmic S1 and S2. No gallop or rub. No murmur.  No LE edema.  Abdomen: Active bowel sounds X 4 quadrants. Soft to palpation.  No guarding or rebound tenderness. Sapp's sign negative.  MSK: Appears to have normal range of motion based on visualized movements  Neurologic: No apparent tremors, facial movements symmetric  Psych: affect normal, alert and oriented      ASSESSMENT/PLAN:    ICD-10-CM    1. Other chronic gastritis without hemorrhage  K29.50       2. Gastroesophageal reflux disease, unspecified whether esophagitis present  K21.9       3. Alternating constipation and diarrhea  R19.8 methylcellulose (CITRUCEL) 500 MG TABS tablet      4. Abdominal bloating  R14.0       5. Hepatic steatosis  K76.0          38 year old male  presented to GI clinic for a follow up on GERD symptoms. Would like to discuss findings of EGD and abdominal ultrasound. Stated that his symptoms almost completely resolved after he started taking pantoprazole every day. Admits to missing a few doses a month.  Explained to the patient that he had unremarkable abdominal US except fatty liver. EGD showed chronic gastritis. No H. Pylori, no metaplasia or dysplasia. Multiple small semi-sessile polyps with no stigmata of recent bleeding were found in the gastric body. Patient was worrying about risk for malignancy. Education provided.  Complains of occasional acid regurgitation when lays down. Encouraged to eat 3-4 hours before bed. Elevate head of bed by 6-8 inches. Limit intake of caffeine (soda) and greasy foods.  Suggested  to continue pantoprazole for 2-3 months. Then, will attempt to reduce the dose to 20 mg a day and discontinue.  Will start a fiber supplement to regulate bowel movements. Patient prefers a pill form of fiber.  Patient verbalized understanding and appreciation of care provided. Stated that all of the questions were answered to her/his satisfaction.  RTC in 3 months    Thank you for this consultation. It was a pleasure to participate in the care of this patient; please contact us with any further questions.    DANA Miles, OFEP-C  Division of Gastroenterology  [facility]  Specialty Care [facility], [place], [place]    This note was created with Dragon voice recognition software, and while reviewed for accuracy, inadvertent minor typographic errors may occur. Please contact the provider if you have any questions.

## 2024-09-21 NOTE — PATIENT INSTRUCTIONS
"It was a pleasure taking care of you today.  I've included a brief summary of our discussion and care plan from today's visit below.  Please review this information with your primary care provider.  ______________________________________________________________________    My recommendations are summarized as follows:    Continue taking pantoprazole 40 mg a day, 30 to 60 minutes before breakfast.  Expected length of treatment: 3 months.  Then, we will try to reduce the dose to 20 mg a day.    2.  Start a fiber supplement, Citrucel 500 mg tablet at supper for 1 week.  Then, slowly increase the dose by 1 tablet every week if needed.  Stay on the lowest effective dose.  This should help to make your stools regular.      3.  Abstain spicy foods.  Limit caffeine intake.  Limit soda pop intake.  Eat small frequent meals every 3-4 hours.    Return to GI Clinic in 3 months to review your progress.    ______________________________________________________________________    Gastroesophageal reflux  Gastroesophageal reflux, also called \"acid reflux,\" occurs when the stomach contents back up into the esophagus and/or mouth. Occasional reflux is normal and can happen in healthy infants, children, and adults, most often after eating a large meal. Most episodes are brief and do not cause bothersome symptoms or complications.   By contrast, people with gastroesophageal reflux disease (GERD) experience bothersome symptoms or damage to the esophagus as a result of acid reflux. Symptoms of GERD can include heartburn, regurgitation, and difficulty or pain with swallowing.  The main cause of GERD is a transient relaxation or weakening of the lower esophageal sphincter (LES) which allows regurgitation of gastric acid and other gastric contents, including bile, back into the esophagus. The esophageal lining is susceptible to irritation by acid because it does not have the thick mucus protection of the stomach. Some people with GERD do not " experience heartburn but may have burning sensation in the mouth, a feeling that food is stuck at any level of the esophagus, or hoarseness in the morning.  There are a number of predisposing factors associated with GERD, including a hiatal hernia, cigarette smoking, alcohol use, being overweight or obese, and pregnancy. Foods such as citrus fruits, chocolate, caffeinated drinks, fried foods, garlic, onions, spicy foods, and tomato-based foods, such as chili, pizza, and spaghetti sauce, are associated with heartburn symptoms. Consumption of large high-fat meals requires prolonged gastric passage times and the increased stomach pressure may lead to movement of hydrochloric acid from the stomach into the esophagus. Additionally, lying prone after a meal promotes backflow of stomach contents and the development of symptoms.      Lifestyle modifications for gastroesophageal reflux disease (GERD).   1. Change your eating habits.  -- It's best to eat several small meals instead of two or three large meals.  -- After you eat, wait 2 to 3 hours before you lie down. Late-night snacks aren't a good idea.   -- Chocolate, mint, and alcohol can make GERD worse. They relax the valve between the esophagus and the stomach.  -- Spicy foods, foods that have a lot of acid (like tomatoes and oranges), foods with high fat content, and coffee can make GERD symptoms worse in some people. If your symptoms are worse after you eat certain foods, try to avoid them.     2. Do not smoke or chew tobacco. Saliva helps to neutralize refluxed acid, and smoking reduces the amount of saliva in the mouth and throat. Smoking also lowers the pressure in the lower esophageal sphincter and provokes coughing, causing frequent episodes of acid reflux in the esophagus.     3. If you have GERD symptoms at night, raise the head of your bed 6 in. (15 cm) to 8 in. (20 cm) by putting the frame on blocks or placing a foam wedge under the head of your mattress.  (Adding extra pillows does not work.)    4. Avoid or reduce pressure on your stomach. Don't wear tight clothing around your middle.    5. Lose weight if you need to. Losing just 5 to 10 pounds can help.    6.Try diaphragmatic (belly) breathing. Research has indicated that people with GERD who practice belly breathing after eating reduce how often they experience acid reflux. Diaphragmatic breathing will reduce pressure within the stomach and increases tone of lower esophageal sphincter.  Practice these breathing exercises 10 times each. Try to do your exercises 3 to 4 times each day. You can lie on your back or sit up straight in a chair to do these exercises.  ?Diaphragmatic breathing :  Place 1 hand over your abdomen and the other on your chest.  Slowly take a deep breath in through your nose. When you do this, think about your breath moving the hand on your abdomen out. This pulls more air into your lungs. The hand on your chest should not move very much if you are breathing the right way.  Breathe out slowly through pursed lips. Gently press on your belly as you breathe out. This will push up on your diaphragm to help get your air out.  Repeat.              ____________________________________________________________________        Who do I call with any questions after my visit?  Please be in touch if there are any further questions that arise following today's visit.  There are multiple ways to contact your gastroenterology care team.      During business hours, you may reach a Gastroenterology nurse at [phone], option 3.     To schedule or reschedule an appointment, please call [phone].   To schedule your imaging studies (CT, MRI, ultrasound)  call [phone] (or toll-free # [phone])  To schedule your lab work at [facility], please call [phone]    You can always send a secure message through CloudCheckr.  CloudCheckr messages are answered by your nurse or doctor  typically within 24 hours.  Please allow extra time on weekends and holidays.      For urgent/emergent questions after business hours, you may reach the on-call GI Fellow by contacting the [facility]  at [phone].    In order for your refill to be processed in a timely fashion, it is your responsibility to ensure you follow the recommendations from your provider regarding your laboratory studies and follow up appointments.       How will I get the results of any tests ordered?    You will receive all of your results.  If you have signed up for LoopMet, any tests ordered at your visit will be available to you after your physician reviews them.  Typically this takes 1-2 weeks.  If there are urgent results that require a change in your care plan, your physician or nurse will call you to discuss the next steps.   What is YABUY?  YABUY is a secure way for you to access all of your healthcare records from the [facility].  It is a web based computer program, so you can sign on to it from any location.  It also allows you to send secure messages to your care team.  I recommend signing up for YABUY access if you have not already done so and are comfortable with using a computer.    How to I schedule a follow-up visit?  If you did not schedule a follow-up visit today, please call [phone] to schedule a follow-up office visit.      Sincerely,  JEFFREY Miles,  [facility],  Division of Gastroenterology   ([facility])

## 2024-09-23 ENCOUNTER — OFFICE VISIT (OUTPATIENT)
Dept: GASTROENTEROLOGY | Facility: CLINIC | Age: 39
End: 2024-09-23
Attending: NURSE PRACTITIONER
Payer: MEDICAID

## 2024-09-23 VITALS
HEART RATE: 84 BPM | HEIGHT: 64 IN | BODY MASS INDEX: 29.71 KG/M2 | SYSTOLIC BLOOD PRESSURE: 112 MMHG | DIASTOLIC BLOOD PRESSURE: 56 MMHG | WEIGHT: 174 LBS

## 2024-09-23 DIAGNOSIS — R14.0 ABDOMINAL BLOATING: ICD-10-CM

## 2024-09-23 DIAGNOSIS — K21.9 GASTROESOPHAGEAL REFLUX DISEASE, UNSPECIFIED WHETHER ESOPHAGITIS PRESENT: ICD-10-CM

## 2024-09-23 DIAGNOSIS — K29.50 OTHER CHRONIC GASTRITIS WITHOUT HEMORRHAGE: Primary | ICD-10-CM

## 2024-09-23 DIAGNOSIS — R19.8 ALTERNATING CONSTIPATION AND DIARRHEA: ICD-10-CM

## 2024-09-23 DIAGNOSIS — K76.0 HEPATIC STEATOSIS: ICD-10-CM

## 2024-09-23 PROCEDURE — 99214 OFFICE O/P EST MOD 30 MIN: CPT | Performed by: NURSE PRACTITIONER

## 2024-09-23 ASSESSMENT — PAIN SCALES - GENERAL: PAINLEVEL: NO PAIN (0)

## 2024-09-23 NOTE — LETTER
9/23/2024      Brian Ortiz  [address]  [place] [address]      Dear Colleague,    Thank you for referring your patient, Brian Ortiz, to the [facility]. Please see a copy of my visit note below.      [facility]  [address] [address]  Phone: [phone]    Patient:  Brian Ortiz, Date of birth 1985    Referring Provider: Mamie Warren       Gastroenterology CLINIC VISIT, RETURN PATIENT    REASON FOR CONSULTATION: follow up    HPI: 38 year old male presented to GI clinic for a follow up. PMH of hyperlipidemia, HTN, and schizophrenia.The patient was seen for GERD symptoms.  Patient complained of ongoing painful acid regurgitation into his mouth, burping, hiccups, and cough.  He reported upper abdominal pain, not related to defecation or meal intake.  Denied any problems with bowel elimination. Those are ongoing symptoms for the last 1 year.    Was referred to EGD and abdominal ultrasound. Started on pantoprazole. Patient reported improvement in acid reflux. At times, wakes up at night from acid regurgitation and gasping for air. Asks if he could have sleep apnea. Denies nausea or vomiting. No abdominal pain, but reported some bloating. Complains of irregular stool pattern-some days, has a few soft or loose stools; then, skips BM for a few days. Denies hematochezia or melena.Had formed stool this morning.      PREVIOUS ENDOSCOPY:  8/6/2024 EGD by Dr. Dennison  Findings:       The Z-line was regular and was found 35 cm from the incisors.        The exam of the esophagus was otherwise normal.        The gastroesophageal flap valve was visualized endoscopically and        classified as Hill Grade II (fold present, opens with respiration).        The entire examined stomach was normal. Biopsies were taken with a cold        forceps for histology.        Multiple small semi-sessile polyps with no stigmata of recent  bleeding        were found in the gastric body.        The examined duodenum was normal.   Final Diagnosis   A.  Stomach, body, biopsy:  - Oxyntic type gastric mucosa with mild chronic inflammation.  - Negative for H. Pylori organisms on routine stains.  - Negative for intestinal metaplasia.   -Negative for dysplasia or malignancy       PERTINENT STUDIES Reviewed in EMR  7/15/2024 Abdominal ultrasound  FINDINGS:    Gallbladder: Normal with no cholelithiasis, wall thickening or focal  tenderness.     Bile ducts:  CHD is normal diameter.  No intrahepatic biliary  dilatation.   Liver: Fatty liver. No focal lesion identified.   Pancreas:  Partially obscured by overlying bowel gas,  but grossly  unremarkable.    Spleen:  Normal.    Right kidney:  Absent.   Left kidney:  Normal.   Aorta and IVC:  Not specifically assessed.                                                                  IMPRESSION:    1. No acute abnormality.  2. Fatty liver.    3/2/2023 CT scan of abdomen and pelvis  FINDINGS:   LOWER CHEST: Normal.     HEPATOBILIARY: No significant mass or bile duct dilatation. No calcified gallstones.      PANCREAS: Normal.     SPLEEN: Normal.     ADRENAL GLANDS: Normal.     KIDNEYS/BLADDER: No significant mass, stone, or hydronephrosis.     BOWEL: Moderate distention of the stomach with fluid. Remainder of the bowel is unremarkable without evidence of obstruction or inflammatory change. Normal appendix.     LYMPH NODES: Normal.     VASCULATURE: Unremarkable.     PELVIC ORGANS: Normal.     MUSCULOSKELETAL: Normal.                                                                   IMPRESSION:   1.  Moderate gastric distention. Otherwise, no acute process in the abdomen or pelvis.    ROS: 10pt ROS performed and otherwise negative.    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  Bilateral sagittal split ramus osteotomy.  [facility]     ZZHC CREATE EARDRUM OPENING,GEN ANESTH      P.E. Tubes         PERTINENT MEDICATIONS:  Current Outpatient Medications   Medication Sig Dispense Refill     atorvastatin (LIPITOR) 20 MG tablet Take 1 tablet (20 mg) by mouth at bedtime 90 tablet 0     cetirizine (ZYRTEC) 10 MG tablet Take 10 mg by mouth daily       FANAPT 2 MG TABS tablet Take 2 mg by mouth       FANAPT 6 MG TABS tablet Take 6 mg by mouth At night       hydrOXYzine HCl (ATARAX) 25 MG tablet Take 25 mg by mouth       levothyroxine (SYNTHROID/LEVOTHROID) 50 MCG tablet Take 1 tablet (50 mcg) by mouth daily 90 tablet 0     methylcellulose (CITRUCEL) 500 MG TABS tablet Take 2 tablets (1,000 mg) by mouth 2 times daily. Start with one tablet at supper and increase the dose by one tablet weekly 120 tablet 2     pantoprazole (PROTONIX) 40 MG EC tablet Take 1 tablet (40 mg) by mouth daily. 90 tablet 2     sertraline (ZOLOFT) 100 MG tablet Take 150 mg by mouth daily           SOCIAL HISTORY:  Social History     Socioeconomic History     Marital status: Single     Spouse name: Not on file     Number of children: Not on file     Years of education: Not on file     Highest education level: Not on file   Occupational History     Not on file    Tobacco Use     Smoking status: Never     Smokeless tobacco: Never   Vaping Use     Vaping status: Never Used   Substance and Sexual Activity     Alcohol use: No     Alcohol/week: 0.0 standard drinks of alcohol     Drug use: No     Sexual activity: Never   Other Topics Concern     Parent/sibling w/ CABG, MI or angioplasty before 65F 55M? Not Asked   Social History Narrative     Not on file     Social Determinants of Health     Financial Resource Strain: Not on file   Food Insecurity: No Food Insecurity (7/4/2024)    [facility]    Hunger Vital Sign      Worried About Running Out of Food in the Last Year: Never true      Ran Out of Food in the Last Year: Never true   Transportation Needs: No Transportation Needs (7/4/2024)    [facility]    Transportation Needs      In the past 12 months, has lack of transportation kept you from medical appointments, meetings, work, or from getting medicines or things needed for daily living?: No   Physical Activity: Not on file   Stress: Not on file   Social Connections: Not on file   Interpersonal Safety: High Risk (8/2/2024)    Interpersonal Safety      Do you feel physically and emotionally safe where you currently live?: No      Within the past 12 months, have you been hit, slapped, kicked or otherwise physically hurt by someone?: No      Within the past 12 months, have you been humiliated or emotionally abused in other ways by your partner or ex-partner?: No   Housing Stability: Low Risk  (7/4/2024)    [facility]    Housing Stability Vital Sign      Unable to Pay for Housing in the Last Year: No      Number of Times Moved in the Last Year: 0      Homeless in the Last Year: No       FAMILY HISTORY:  Denies colon/panc/esophageal/other GI CA, no other Gracia or other HPS-related Chavez. No IBD/celiac, no other AI/liver/thyroid disease.    Family History   Adopted: Yes   Problem  "Relation Age of Onset     Substance Abuse Mother      Substance Abuse Father        PHYSICAL EXAMINATION:  Vitals reviewed  /56 (BP Location: Right arm, Patient Position: Sitting, Cuff Size: Adult Regular)   Pulse 84   Ht 1.614 m (5' 3.54\")   Wt 78.9 kg (174 lb)   BMI 30.30 kg/m      General: Patient appears well in no acute distress.    Skin: No visualized rash or lesions on visualized skin  HEENT:    EOMI, no erythema, sclera icterus or discharge noted.  Mouth mucosa intact, pink, moist  No cervical or supraclavicular lymphadenopathy. Thyroid gland not enlarged.  Resp: breathing comfortably without accessory muscle usage, speaking in full sentences, no cough. Lung sounds clear  Card: Regular and rhythmic S1 and S2. No gallop or rub. No murmur.  No LE edema.  Abdomen: Active bowel sounds X 4 quadrants. Soft to palpation.  No guarding or rebound tenderness. Sapp's sign negative.  MSK: Appears to have normal range of motion based on visualized movements  Neurologic: No apparent tremors, facial movements symmetric  Psych: affect normal, alert and oriented      ASSESSMENT/PLAN:    ICD-10-CM    1. Other chronic gastritis without hemorrhage  K29.50       2. Gastroesophageal reflux disease, unspecified whether esophagitis present  K21.9       3. Alternating constipation and diarrhea  R19.8 methylcellulose (CITRUCEL) 500 MG TABS tablet      4. Abdominal bloating  R14.0       5. Hepatic steatosis  K76.0          38 year old male  presented to GI clinic for a follow up on GERD symptoms. Would like to discuss findings of EGD and abdominal ultrasound. Stated that his symptoms almost completely resolved after he started taking pantoprazole every day. Admits to missing a few doses a month.  Explained to the patient that he had unremarkable abdominal US except fatty liver. EGD showed chronic gastritis. No H. Pylori, no metaplasia or dysplasia. Multiple small semi-sessile polyps with no stigmata of recent bleeding were " found in the gastric body. Patient was worrying about risk for malignancy. Education provided.  Complains of occasional acid regurgitation when lays down. Encouraged to eat 3-4 hours before bed. Elevate head of bed by 6-8 inches. Limit intake of caffeine (soda) and greasy foods.  Suggested to continue pantoprazole for 2-3 months. Then, will attempt to reduce the dose to 20 mg a day and discontinue.  Will start a fiber supplement to regulate bowel movements. Patient prefers a pill form of fiber.  Patient verbalized understanding and appreciation of care provided. Stated that all of the questions were answered to her/his satisfaction.  RTC in 3 months    Thank you for this consultation. It was a pleasure to participate in the care of this patient; please contact us with any further questions.    DANA Miles, OFEP-C  Division of Gastroenterology  [facility], [place], [place]    This note was created with Dragon voice recognition software, and while reviewed for accuracy, inadvertent minor typographic errors may occur. Please contact the provider if you have any questions.       Again, thank you for allowing me to participate in the care of your patient.        Sincerely,        DANA MILES CNP

## 2024-10-22 NOTE — TELEPHONE ENCOUNTER
Caller states he can feel his heartbeat when laying down to rest; recurrent over past wek   Denies any other symptoms but seem s to be aware that heart beat was fast when he was seen in ED for allergic reaction  In the past  10 days  Denies that he hs felt fast heart rate since   Triage  protocol and  home care, red flag symptoms reviewed   Caller understands and will comply   Concepción Lu RN  FNA       Reason for Disposition    Palpitations    Additional Information    Negative: Passed out (i.e., lost consciousness, collapsed and was not responding)    Negative: Shock suspected (e.g., cold/pale/clammy skin, too weak to stand, low BP, rapid pulse)    Negative: Difficult to awaken or acting confused (e.g., disoriented, slurred speech)    Negative: Visible sweat on face or sweat dripping down face    Negative: Unable to walk, or can only walk with assistance (e.g., requires support)    Negative: Received SHOCK from implantable cardiac defibrillator and has persisting symptoms (i.e., palpitations, lightheadedness)    Negative: Dizziness, lightheadedness, or weakness and heart beating very rapidly (e.g., > 140 / minute)    Negative: Dizziness, lightheadedness, or weakness and heart beating very slowly (e.g., < 50 / minute)    Negative: Sounds like a life-threatening emergency to the triager    Negative: Chest pain    Negative: Difficulty breathing    Negative: Dizziness, lightheadedness, or weakness    Negative: Heart beating very rapidly (e.g., > 140 / minute) and present now  (Exception: During exercise.)    Negative: Heart beating very slowly (e.g., < 50 / minute)  (Exception: Athlete and heart rate normal for caller.)    Negative: New or worsened shortness of breath with activity (dyspnea on exertion)    Negative: Patient sounds very sick or weak to the triager    Negative: Wearing a 'Holter monitor' or 'cardiac event monitor'    Negative: Received SHOCK from implantable cardiac defibrillator (and now feels  well)    Negative: Heart beating very rapidly (e.g., > 140 / minute) and not present now  (Exception: During exercise.)    Negative: Skipped or extra beat(s) and increases with exercise or exertion    Negative: Skipped or extra beat(s) and occurs 4 or more times per minute    Negative: History of heart disease (i.e., heart attack, bypass surgery, angina, angioplasty)  (Exception: Brief heartbeat symptoms that went away and now feels well.)    Negative: Age > 60 years  (Exception: Brief heartbeat symptoms that went away and now feels well.)    Negative: Taking water pill (i.e., diuretic) or heart medication (e.g., digoxin)    Negative: Patient wants to be seen    Negative: Heart rhythm alert (e.g., 'you have irregular heartbeat') from personal wearable device (e.g., Apple Watch)    Negative: History of hyperthyroidism or taking thyroid medication    Negative: Substance use (drug use) or misuse, known or suspected    Negative: ADHD and taking stimulant medication    Negative: Palpitations and no improvement after following Care Advice    Negative: Problems with anxiety or stress    Negative: Palpitations are a chronic symptom (recurrent or ongoing AND present > 4 weeks)    Protocols used: HEART RATE AND HEARTBEAT [id number]       is progressing as expected towards functional goals listed.    [] Progression is slowed due to complexities/Impairments listed.  [] Progression has been slowed due to co-morbidities.  [] Plan just implemented, too soon (<30days) to assess goals progression   [] Goals require adjustment due to lack of progress  [] Patient is not progressing as expected and requires additional follow up with physician  [] Other:     TREATMENT PLAN     Frequency/Duration: 2x/week for 4-6 weeks for the following treatment interventions:    Interventions:  Therapeutic Exercise (41560) including: strength training, ROM, and functional mobility  Therapeutic Activities (97323) including: functional mobility training and education.  Neuromuscular Re-education (43496) activation and proprioception, including postural re-education.    Gait Training (76369) for normalization of ambulation patterns and AD training.   Manual Therapy (02154) as indicated to include: Passive Range of Motion, Gr I-IV mobilizations, Grade V Manipulation, Soft Tissue Mobilization, and Myofascial Release  Patient education on joint protection, postural re-education, activity modification, and progression of HEP    Plan: POC initiated as per evaluation    Electronically Signed by Zak Ruiz PT  Date: 10/22/2024     Note: Portions of this note have been templated and/or copied from initial evaluation, reassessments and prior notes for documentation efficiency.    Note: If patient does not return for scheduled/recommended follow up visits, this note will serve as a discharge from care along with the most recent update on progress.    Ortho Evaluation

## 2024-10-31 DIAGNOSIS — E78.5 HYPERLIPIDEMIA LDL GOAL <130: ICD-10-CM

## 2024-10-31 DIAGNOSIS — E03.8 OTHER SPECIFIED HYPOTHYROIDISM: ICD-10-CM

## 2024-10-31 RX ORDER — LEVOTHYROXINE SODIUM 50 UG/1
50 TABLET ORAL DAILY
Qty: 30 TABLET | Refills: 5 | Status: SHIPPED | OUTPATIENT
Start: 2024-10-31

## 2024-10-31 RX ORDER — ATORVASTATIN CALCIUM 20 MG/1
20 TABLET, FILM COATED ORAL AT BEDTIME
Qty: 30 TABLET | Refills: 5 | Status: SHIPPED | OUTPATIENT
Start: 2024-10-31

## 2024-12-12 NOTE — PROGRESS NOTES
Brian Ortiz is a 39 year old patient who is being evaluated via a billable virtual visit.       How would you like to obtain your AVS? MyChart  If the video visit is dropped, the invitation should be resent by: Text to cell phone: [phone]  Will anyone else be joining your video visit? No    Video-Visit Details     Type of service:  Video Visit     Video Start Time (time video started): 1423     Video End Time (time video stopped): 1435     Physician has received verbal consent for a Video Visit from the patient? Yes     Originating Location (pt. Location): Home    Distant Location (provider location):  Off-site    Platform used for Video Visit: [address] [address]  Phone: [phone]    Patient:  Brian Ortiz, Date of birth 1985  Date of Visit: 12/18/24      GASTROENTEROLOGY RETURN PATIENT VIDEO VISIT    CC/REFERRING MD:    Olu Joseph   REASON FOR CONSULTATION:   Referred for Follow Up (Gastroesophageal reflux disease/)      HISTORY OF PRESENT ILLNESS:  Brian Ortiz is a 39 year old male who presents to GI clinic for a follow up. PMH of hyperlipidemia, HTN, and schizophrenia.  Patient was seen for ongoing painful acid regurgitation into his mouth, burping, hiccups, and cough for about one year. He had unremarkable abdominal US except fatty liver. EGD showed chronic gastritis. No H. Pylori, no metaplasia or dysplasia. Multiple small semi-sessile polyps with no stigmata of recent bleeding were found in the gastric body. Started on a PPI. Stated that his symptoms almost completely resolved after he started taking pantoprazole every day.  Patient is not able to wean of pantoprazole and discontinue the medication due to rebound acid reflux.  He asking for a new prescription.  Stated that his symptoms had improved after he elevated head of the bed and started avoiding foods that aggravate his  symptoms.  Patient reported eating only 1 meal a day due to worsening in depression.  He denies SI or plans.  Suggested to reach out to his mental health provider for assistance with his symptoms management.  Patient denies nausea or emesis.  Reported intermittent upper abdominal discomfort that usually improves after defecation.  Complains of continuous problem with irregular bowel movements, he is more prone to constipation.  I suggested to start a fiber supplement to regulate bowel movements but fiber is not covered by his insurance.  Patient cannot afford the supplement.  He is interested to try an alternative medication or supplement.  Stated that he is having bowel movement 3-4 times a week.    PREVIOUS ENDOSCOPY:  8/6/2024 EGD by Dr. Dennison  Findings:       The Z-line was regular and was found 35 cm from the incisors.        The exam of the esophagus was otherwise normal.        The gastroesophageal flap valve was visualized endoscopically and        classified as Hill Grade II (fold present, opens with respiration).        The entire examined stomach was normal. Biopsies were taken with a cold        forceps for histology.        Multiple small semi-sessile polyps with no stigmata of recent bleeding        were found in the gastric body.        The examined duodenum was normal.   Final Diagnosis   A.  Stomach, body, biopsy:  - Oxyntic type gastric mucosa with mild chronic inflammation.  - Negative for H. Pylori organisms on routine stains.  - Negative for intestinal metaplasia.   -Negative for dysplasia or malignancy         PERTINENT STUDIES Reviewed in EMR  7/15/2024 Abdominal ultrasound  FINDINGS:    Gallbladder: Normal with no cholelithiasis, wall thickening or focal  tenderness.     Bile ducts:  CHD is normal diameter.  No intrahepatic biliary  dilatation.   Liver: Fatty liver. No focal lesion identified.   Pancreas:  Partially obscured by overlying bowel gas,  but grossly  unremarkable.    Spleen:   Normal.    Right kidney:  Absent.   Left kidney:  Normal.   Aorta and IVC:  Not specifically assessed.                                                                  IMPRESSION:    1. No acute abnormality.  2. Fatty liver.     3/2/2023 CT scan of abdomen and pelvis  FINDINGS:   LOWER CHEST: Normal.     HEPATOBILIARY: No significant mass or bile duct dilatation. No calcified gallstones.      PANCREAS: Normal.     SPLEEN: Normal.     ADRENAL GLANDS: Normal.     KIDNEYS/BLADDER: No significant mass, stone, or hydronephrosis.     BOWEL: Moderate distention of the stomach with fluid. Remainder of the bowel is unremarkable without evidence of obstruction or inflammatory change. Normal appendix.     LYMPH NODES: Normal.     VASCULATURE: Unremarkable.     PELVIC ORGANS: Normal.     MUSCULOSKELETAL: Normal.                                                                   IMPRESSION:   1.  Moderate gastric distention. Otherwise, no acute process in the abdomen or pelvis.      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     has a past surgical history that includes surgical history of -  (08/10/2004); CREATE EARDRUM OPENING,GEN ANESTH; and Esophagoscopy, gastroscopy, duodenoscopy (EGD), combined (N/A, 8/6/2024).     reports that he has never smoked. He has never used smokeless tobacco. He reports that he does not drink alcohol and does not use drugs.    family history includes Substance Abuse in his father and mother. He was adopted.    ALLERGIES:     Allergies   Allergen Reactions    Zyprexa [Olanzapine]        PERTINENT MEDICATIONS:    Current Outpatient Medications:     atorvastatin (LIPITOR) 20 MG tablet, TAKE 1 TABLET BY MOUTH EVERY NIGHT AT BEDTIME,  Disp: 30 tablet, Rfl: 5    cetirizine (ZYRTEC) 10 MG tablet, Take 10 mg by mouth daily, Disp: , Rfl:     FANAPT 2 MG TABS tablet, Take 2 mg by mouth, Disp: , Rfl:     FANAPT 6 MG TABS tablet, Take 6 mg by mouth At night, Disp: , Rfl:     hydrOXYzine HCl (ATARAX) 25 MG tablet, Take 25 mg by mouth, Disp: , Rfl:     levothyroxine (SYNTHROID/LEVOTHROID) 50 MCG tablet, TAKE 1 TABLET BY MOUTH DAILY, Disp: 30 tablet, Rfl: 5    methylcellulose (CITRUCEL) 500 MG TABS tablet, Take 2 tablets (1,000 mg) by mouth 2 times daily. Start with one tablet at supper and increase the dose by one tablet weekly, Disp: 120 tablet, Rfl: 2    pantoprazole (PROTONIX) 40 MG EC tablet, Take 1 tablet (40 mg) by mouth daily., Disp: 90 tablet, Rfl: 2    sertraline (ZOLOFT) 100 MG tablet, Take 150 mg by mouth daily, Disp: , Rfl:       ROS: 10pt ROS performed and otherwise negative.    PHYSICAL EXAMINATION:  Wt:   Wt Readings from Last 2 Encounters:   09/23/24 78.9 kg (174 lb)   08/02/24 81.3 kg (179 lb 3.2 oz)      Physical Exam  Vitals reviewed: There were no vitals taken for this visit.   Constitutional: aaox3, cooperative, pleasant, not dyspneic/diaphoretic, no acute distress  Eyes: Sclera anicteric/injected  Respiratory: Unlabored breathing, speaking in full sentences.   Psych: Normal affect, speech is clear and appropriate.Neatly groomed    RECENT LABS:   Recent Labs   Lab Test 03/02/23  0619 08/21/22  0936   WBC 13.6* 8.3   HGB 15.0 14.0   HCT 44.1 40.8    190     Recent Labs   Lab Test 03/02/23  0619 08/21/22  0936   ALT 45 40   AST 49 31     Recent Labs   Lab Test 09/13/23  1552 03/02/23  0619   CR 1.1 1.11     TSH   Date Value Ref Range Status   09/03/2024 2.01 0.30 - 4.20 uIU/mL Final   09/15/2022 2.63 0.40 - 4.00 mU/L Final   06/24/2015 1.79 0.40 - 4.00 mU/L Final         ASSESSMENT/PLAN:    ICD-10-CM    1. Gastroesophageal reflux disease, unspecified whether esophagitis present  K21.9       2. Alternating constipation and  diarrhea  R19.8       3. Regurgitation of food  R11.10       4. Epigastric pain  R10.13       5. Gastroesophageal reflux disease with esophagitis, unspecified whether hemorrhage  K21.00 pantoprazole (PROTONIX) 40 MG EC tablet      6. Chronic idiopathic constipation  K59.04 polyethylene glycol (MIRALAX) 17 GM/Dose powder           Brian Ortiz is a 39 year old male who presents to GI clinic for a follow up.  I saw the patient for persistent GERD symptoms that he described as burning in his chest, acid regurgitation into his mouth, burping, hiccups, and cough.  Symptoms had improved after he modified his diet and started taking pantoprazole.  He also elevated head of the bed and it helped his night time symptoms.  We planned to wean off and discontinue pantoprazole, but patient complains of rebound acid reflux.  Decided to continue pantoprazole.  The prescription was updated.  Regarding his irregular bowel movements, patient was advised to start a fiber supplement.  Unfortunately, it is not covered by his insurance and he is not able to afford it.  Seems that MiraLAX, Senokot, docusate, and other like stool softeners and laxatives are not covered by his insurance as well.  Provided a handout on fiber content in common foods and an anti-constipation fiber paste recipe.  Anti-constipation fruit paste:  Ingredients  1 cup pitted prunes  1 cup raisins  1 cup pitted dates  1/2 cup orange juice  2/3 cup prune juice  Tip:  For even more fiber add 1 cup of natural wheat bran to the fruit mixture.  Directions (Makes 25 servings)  Combine all ingredients in a bowl and soak overnight in the refrigerator. Blend in  or  until smooth. Keep in the refrigerator (can be kept frozen in small containers).   Serving size: 2 tablespoons. Spread it on toast or eat from a spoon.  Caution: May cause bloating and abdominal cramping. Start with smaller portion and increase it gradually over a few weeks as  tolerate.    Patient verbalized understanding and appreciation of care provided. Stated that all of the questions were answered to her/his satisfaction.  Follow up in 6 months  This note was created with Dragon voice recognition software. Inadvertent minor typographic errors may occur in transcription. Feel free to contact the provider if you have any questions.  I sincerely appreciate an opportunity to provide consultation for this pleasant patient.    JEFFREY Miles  [facility],  Gastroenterology,  [place], [place]

## 2024-12-18 ENCOUNTER — VIRTUAL VISIT (OUTPATIENT)
Dept: GASTROENTEROLOGY | Facility: CLINIC | Age: 39
End: 2024-12-18
Payer: MEDICAID

## 2024-12-18 DIAGNOSIS — R11.10 REGURGITATION OF FOOD: ICD-10-CM

## 2024-12-18 DIAGNOSIS — K21.9 GASTROESOPHAGEAL REFLUX DISEASE, UNSPECIFIED WHETHER ESOPHAGITIS PRESENT: Primary | ICD-10-CM

## 2024-12-18 DIAGNOSIS — R10.13 EPIGASTRIC PAIN: ICD-10-CM

## 2024-12-18 DIAGNOSIS — K59.04 CHRONIC IDIOPATHIC CONSTIPATION: ICD-10-CM

## 2024-12-18 DIAGNOSIS — R19.8 ALTERNATING CONSTIPATION AND DIARRHEA: ICD-10-CM

## 2024-12-18 DIAGNOSIS — K21.00 GASTROESOPHAGEAL REFLUX DISEASE WITH ESOPHAGITIS, UNSPECIFIED WHETHER HEMORRHAGE: ICD-10-CM

## 2024-12-18 PROCEDURE — 99214 OFFICE O/P EST MOD 30 MIN: CPT | Mod: 95 | Performed by: NURSE PRACTITIONER

## 2024-12-18 RX ORDER — POLYETHYLENE GLYCOL 3350 17 G/17G
1 POWDER, FOR SOLUTION ORAL DAILY
Qty: 578 G | Refills: 5 | Status: SHIPPED | OUTPATIENT
Start: 2024-12-18

## 2024-12-18 RX ORDER — PANTOPRAZOLE SODIUM 40 MG/1
40 TABLET, DELAYED RELEASE ORAL DAILY
Qty: 90 TABLET | Refills: 5 | Status: SHIPPED | OUTPATIENT
Start: 2024-12-18

## 2024-12-18 NOTE — PATIENT INSTRUCTIONS
It was a pleasure taking care of you today.  I've included a brief summary of our discussion and care plan from today's visit below.  Please review this information with your primary care provider.  ______________________________________________________________________    My recommendations are summarized as follows:    1.  As we discussed today, please continue pantoprazole 40 mg a day, taking the medication 30 to 60 minutes before breakfast.    2.  I sent a prescription for MiraLAX to take for constipation, but I am not certain that it will be covered by your insurance.  Increasing fiber in your diet may reduce or eliminate constipation. Many fruits and vegetables can be particularly helpful in preventing and treating constipation .This is especially true of citrus fruits, prunes, and prune juice. Some breakfast cereals are also an excellent source of dietary fiber.  Start your day off with a high-fiber breakfast such as whole grain oatmeal sprinkled with raisins, blueberries, or nuts.   Slice up raw veggies (carrots, celery, bell peppers) and fruit (apples, pears) and keep  them in baggies for quick high-fiber snacks.   Munch on a small handful of nuts, such as peanuts, walnuts, and almonds.   Look for individually wrapped prunes in the grocery store for a quick fiber snack.   When buying frozen veggies, look for ones that have been  flash frozen.    Add half a cup of garbanzo or black beans or peas to your salad to add fiber, texture, and flavor.   EXAMPLES OF HIGH-FIBER FOODS   1 large apple or pear (5g)   1 cup Raisin Bran (5g)     cup raspberries (9g)   1 cup cooked broccoli (5g)   23 almonds (3.5g)   1 cup black beans (15g)   2 brazil nuts (2.5g)   1 cup peas (16g)    Anti-constipation fruit paste:  Ingredients  1 cup pitted prunes  1 cup raisins  1 cup pitted dates  1/2 cup orange juice  2/3 cup prune juice  Tip:  For even more fiber add 1 cup of natural wheat bran to the fruit mixture.  Directions (Makes  "25 servings)  Combine all ingredients in a bowl and soak overnight in the refrigerator. Blend in  or  until smooth. Keep in the refrigerator (can be kept frozen in small containers).   Serving size: 2 tablespoons. Spread it on toast or eat from a spoon.  Caution: May cause bloating and abdominal cramping. Start with smaller portion and increase it gradually over a few weeks as tolerate.      Return to GI Clinic in 6 months to review your progress.    ______________________________________________________________________  Gastroesophageal reflux  Gastroesophageal reflux, also called \"acid reflux,\" occurs when the stomach contents back up into the esophagus and/or mouth. Occasional reflux is normal and can happen in healthy infants, children, and adults, most often after eating a large meal. Most episodes are brief and do not cause bothersome symptoms or complications.   By contrast, people with gastroesophageal reflux disease (GERD) experience bothersome symptoms or damage to the esophagus as a result of acid reflux. Symptoms of GERD can include heartburn, regurgitation, and difficulty or pain with swallowing.  The main cause of GERD is a transient relaxation or weakening of the lower esophageal sphincter (LES) which allows regurgitation of gastric acid and other gastric contents, including bile, back into the esophagus. The esophageal lining is susceptible to irritation by acid because it does not have the thick mucus protection of the stomach. Some people with GERD do not experience heartburn but may have burning sensation in the mouth, a feeling that food is stuck at any level of the esophagus, or hoarseness in the morning.  There are a number of predisposing factors associated with GERD, including a hiatal hernia, cigarette smoking, alcohol use, being overweight or obese, and pregnancy. Foods such as citrus fruits, chocolate, caffeinated drinks, fried foods, garlic, onions, spicy foods, and " tomato-based foods, such as chili, pizza, and spaghetti sauce, are associated with heartburn symptoms. Consumption of large high-fat meals requires prolonged gastric passage times and the increased stomach pressure may lead to movement of hydrochloric acid from the stomach into the esophagus. Additionally, lying prone after a meal promotes backflow of stomach contents and the development of symptoms.      Lifestyle modifications for gastroesophageal reflux disease (GERD).   1. Change your eating habits.  -- It's best to eat several small meals instead of two or three large meals.  -- After you eat, wait 2 to 3 hours before you lie down. Late-night snacks aren't a good idea.   -- Chocolate, mint, and alcohol can make GERD worse. They relax the valve between the esophagus and the stomach.  -- Spicy foods, foods that have a lot of acid (like tomatoes and oranges), foods with high fat content, and coffee can make GERD symptoms worse in some people. If your symptoms are worse after you eat certain foods, try to avoid them.     2. Do not smoke or chew tobacco. Saliva helps to neutralize refluxed acid, and smoking reduces the amount of saliva in the mouth and throat. Smoking also lowers the pressure in the lower esophageal sphincter and provokes coughing, causing frequent episodes of acid reflux in the esophagus.     3. If you have GERD symptoms at night, raise the head of your bed 6 in. (15 cm) to 8 in. (20 cm) by putting the frame on blocks or placing a foam wedge under the head of your mattress. (Adding extra pillows does not work.)    4. Avoid or reduce pressure on your stomach. Don't wear tight clothing around your middle.    5. Lose weight if you need to. Losing just 5 to 10 pounds can help.    6.Try diaphragmatic (belly) breathing. Research has indicated that people with GERD who practice belly breathing after eating reduce how often they experience acid reflux. Diaphragmatic breathing will reduce pressure within  the stomach and increases tone of lower esophageal sphincter.  Practice these breathing exercises 10 times each. Try to do your exercises 3 to 4 times each day. You can lie on your back or sit up straight in a chair to do these exercises.  ?Diaphragmatic breathing :  Place 1 hand over your abdomen and the other on your chest.  Slowly take a deep breath in through your nose. When you do this, think about your breath moving the hand on your abdomen out. This pulls more air into your lungs. The hand on your chest should not move very much if you are breathing the right way.  Breathe out slowly through pursed lips. Gently press on your belly as you breathe out. This will push up on your diaphragm to help get your air out.  Repeat.       ____________________________________________________________________  Please see below for any additional questions and scheduling guidelines.    Sign up for [employer]: [employer] patient portal serves as a secure platform for accessing your medical records from the [facility]. Additionally, [employer] facilitates easy, timely, and secure messaging with your care team. If you have not signed up, you may do so by using the provided code or calling [phone].    Coordinating your care after your visit:  There are multiple options for scheduling your follow-up care based on your provider's recommendation.    How do I schedule a follow-up clinic appointment:   After your appointment, you may receive scheduling assistance with the Clinic Coordinators by having a seat in the waiting room and a Clinic Coordinator will call you up to schedule.  Virtual visits or after you leave the clinic:  Your provider has placed a follow-up order in the [employer] portal for scheduling your return appointment. A member of the scheduling team will contact you to schedule.  [employer] Scheduling: Timely scheduling through [employer] is advised to ensure appointment availability.   Call to schedule: You may  schedule your follow-up appointment(s) by calling [phone], option 1.    How do I schedule my endoscopy or colonoscopy procedure:  If a procedure, such as a colonoscopy or upper endoscopy was ordered by your provider, the scheduling team will contact you to schedule this procedure. Or you may choose to call to schedule at   [phone], option 2.  Please allow 20-30 minutes when scheduling a procedure.    How do I get my blood work done? To get your blood work done, you need to schedule a lab appointment at an [facility]. There are multiple ways to schedule:   At the clinic: The Clinic Coordinator you meet after your visit can help you schedule a lab appointment.   [facility] scheduling: [facility] offers online lab scheduling at all [facility] laboratory locations.   Call to schedule: You can call [phone] to schedule your lab appointment.    How do I schedule my imaging study: To schedule imaging studies, such as CT scans, ultrasounds, MRIs, or X-rays, contact Imaging Services at [phone].    How do I schedule a referral to another doctor: If your provider recommended a referral to another specialist(s), the referral order was placed by your provider. You will receive a phone call to schedule this referral, or you may choose to call the number attached to the referral to self-schedule.    For Post-Visit Question(s):  For any inquiries following today's visit:  Please utilize [employer] messaging and allow 48 hours for reply or contact the Call Center during normal business hours at [phone], option 3.  For Emergent After-hours questions, contact the On-Call GI Fellow through the [facility]  at [phone].  In addition, you may contact your Nurse directly using the provided contact information.    Test Results:  Test results will be accessible via [employer] in compliance with the 21st Century Cures Act. This means that your results will be available to you at the  same time as your provider. Often you may see your results before your provider does. Results are reviewed by staff within two weeks with communication follow-up. Results may be released in the patient portal prior to your care team review.    Prescription Refill(s):  Medication prescribed by your provider will be addressed during your visit. For future refills, please coordinate with your pharmacy. If you have not had a recent clinic visit or routine labs, for your safety, your provider may not be able to refill your prescription.     Sincerely,  JEFFREY Miles,  [facility],  Division of Gastroenterology   ([facility])

## 2024-12-21 ENCOUNTER — HEALTH MAINTENANCE LETTER (OUTPATIENT)
Age: 39
End: 2024-12-21

## 2025-05-14 DIAGNOSIS — E78.5 HYPERLIPIDEMIA LDL GOAL <130: ICD-10-CM

## 2025-05-14 RX ORDER — ATORVASTATIN CALCIUM 20 MG/1
20 TABLET, FILM COATED ORAL AT BEDTIME
Qty: 90 TABLET | Refills: 1 | Status: SHIPPED | OUTPATIENT
Start: 2025-05-14

## 2025-05-28 NOTE — PROGRESS NOTES
Brian Ortiz is a 39 year old patient who is being evaluated via a billable virtual visit.       How would you like to obtain your AVS? MyChart  If the video visit is dropped, the invitation should be resent by: Text to cell phone: [phone]  Will anyone else be joining your video visit? No    Video-Visit Details     Type of service:  Video Visit     Video Start Time (time video started): 1431     Video End Time (time video stopped): 1442     Physician has received verbal consent for a Video Visit from the patient? Yes     Originating Location (pt. Location): Home    Distant Location (provider location):  Off-site    Platform used for Video Visit: [address] [address]  Phone: [phone]    Patient:  Brian Ortiz, Date of birth 1985  Date of Visit:  6/18/25     GASTROENTEROLOGY RETURN PATIENT VIDEO VISIT    CC/REFERRING MD:    Olu Joseph    REASON FOR CONSULTATION:   Referred for Follow Up (Gastroesophageal reflux disease/)      HISTORY OF PRESENT ILLNESS:  Brian Ortiz is a 39 year old male who presents to GI clinic for a follow up. PMH significant for hyperlipidemia, HTN, and schizophrenia. Patient was seen for painful acid regurgitation into his mouth, burping, hiccups, and cough for about one year. He had unremarkable abdominal US except fatty liver. EGD showed chronic gastritis. No H. Pylori, no metaplasia or dysplasia. Multiple small semi-sessile polyps with no stigmata of recent bleeding were found in the gastric body. Started on a PPI that resulted in resolution of the symptoms.  Stated that he takes pantoprazole faithfully, every day.  Patient stated he has poor appetite, eating 1 meal a day, related to depression.  Denies nausea or vomiting.  No abdominal pain.  No dysphagia or odynophagia.    Patient was advised to start a fiber supplement to manage irregular stools. Unfortunately, it is not  covered by his insurance and he is not able to afford it. Seems that MiraLAX, Senokot, docusate, and other like stool softeners and laxatives are not covered by his insurance as well.  Patient brought MiraLAX and uses it on as-needed basis.  On average, he has formed stool every day or every other day.    Wt Readings from Last 10 Encounters:   09/23/24 78.9 kg (174 lb)   08/02/24 81.3 kg (179 lb 3.2 oz)   06/21/24 83 kg (183 lb)   01/04/24 84.6 kg (186 lb 8 oz)   04/10/23 83.4 kg (183 lb 12.8 oz)   01/20/23 86.7 kg (191 lb 2 oz)   11/04/22 85.3 kg (188 lb)   09/15/22 86.3 kg (190 lb 4 oz)   08/23/22 86.6 kg (191 lb)   08/21/22 85.1 kg (187 lb 9.6 oz)       PREVIOUS ENDOSCOPY:  8/6/2024 EGD by Dr. Dennison  Findings:       The Z-line was regular and was found 35 cm from the incisors.        The exam of the esophagus was otherwise normal.        The gastroesophageal flap valve was visualized endoscopically and        classified as Hill Grade II (fold present, opens with respiration).        The entire examined stomach was normal. Biopsies were taken with a cold        forceps for histology.        Multiple small semi-sessile polyps with no stigmata of recent bleeding        were found in the gastric body.        The examined duodenum was normal.   Final Diagnosis   A.  Stomach, body, biopsy:  - Oxyntic type gastric mucosa with mild chronic inflammation.  - Negative for H. Pylori organisms on routine stains.  - Negative for intestinal metaplasia.   -Negative for dysplasia or malignancy         PERTINENT STUDIES Reviewed in EMR  7/15/2024 Abdominal ultrasound  FINDINGS:    Gallbladder: Normal with no cholelithiasis, wall thickening or focal  tenderness.     Bile ducts:  CHD is normal diameter.  No intrahepatic biliary  dilatation.   Liver: Fatty liver. No focal lesion identified.   Pancreas:  Partially obscured by overlying bowel gas,  but grossly  unremarkable.    Spleen:  Normal.    Right kidney:  Absent.   Left kidney:   Normal.   Aorta and IVC:  Not specifically assessed.                                                                  IMPRESSION:    1. No acute abnormality.  2. Fatty liver.       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     has a past surgical history that includes surgical history of -  (08/10/2004); CREATE EARDRUM OPENING,GEN ANESTH; and Esophagoscopy, gastroscopy, duodenoscopy (EGD), combined (N/A, 8/6/2024).     reports that he has never smoked. He has never used smokeless tobacco. He reports that he does not drink alcohol and does not use drugs.    family history includes Substance Abuse in his father and mother. He was adopted.    ALLERGIES:     Allergies   Allergen Reactions    Zyprexa [Olanzapine]        PERTINENT MEDICATIONS:    Current Outpatient Medications:     atorvastatin (LIPITOR) 20 MG tablet, Take 1 tablet (20 mg) by mouth at bedtime., Disp: 90 tablet, Rfl: 1    cetirizine (ZYRTEC) 10 MG tablet, Take 10 mg by mouth daily, Disp: , Rfl:     FANAPT 2 MG TABS tablet, Take 2 mg by mouth, Disp: , Rfl:     FANAPT 6 MG TABS tablet, Take 6 mg by mouth At night, Disp: , Rfl:     hydrOXYzine HCl (ATARAX) 25 MG tablet, Take 25 mg by mouth, Disp: , Rfl:     levothyroxine (SYNTHROID/LEVOTHROID) 50 MCG tablet, TAKE 1 TABLET BY MOUTH DAILY, Disp: 30 tablet, Rfl: 5    methylcellulose (CITRUCEL) 500 MG TABS tablet, Take 2 tablets (1,000 mg) by mouth 2 times daily. Start with one tablet at supper and increase the dose by one tablet weekly, Disp: 120 tablet, Rfl: 2    pantoprazole (PROTONIX) 40 MG EC tablet, Take 1 tablet (40 mg) by mouth daily., Disp: 90 tablet, Rfl: 5    polyethylene glycol (MIRALAX) 17 GM/Dose powder, Take 17 g (1 Capful) by  mouth daily., Disp: 578 g, Rfl: 5    sertraline (ZOLOFT) 100 MG tablet, Take 150 mg by mouth daily, Disp: , Rfl:       ROS: 10pt ROS performed and otherwise negative.    PHYSICAL EXAMINATION:  Wt:   Wt Readings from Last 2 Encounters:   09/23/24 78.9 kg (174 lb)   08/02/24 81.3 kg (179 lb 3.2 oz)      Physical Exam  Vitals reviewed: There were no vitals taken for this visit.   Constitutional: aaox3, cooperative, pleasant, not dyspneic/diaphoretic, no acute distress  Eyes: Sclera anicteric/injected  Respiratory: Unlabored breathing, speaking in full sentences.   Psych: Normal affect, speech is clear and appropriate.Neatly groomed    RECENT LABS:   Recent Labs   Lab Test 03/02/23  0619 08/21/22  0936   WBC 13.6* 8.3   HGB 15.0 14.0   HCT 44.1 40.8    190     Recent Labs   Lab Test 03/02/23  0619 08/21/22  0936   ALT 45 40   AST 49 31     Recent Labs   Lab Test 09/13/23  1552 03/02/23  0619   CR 1.1 1.11     TSH   Date Value Ref Range Status   09/03/2024 2.01 0.30 - 4.20 uIU/mL Final   09/15/2022 2.63 0.40 - 4.00 mU/L Final   06/24/2015 1.79 0.40 - 4.00 mU/L Final         ASSESSMENT/PLAN:    ICD-10-CM    1. Gastroesophageal reflux disease, unspecified whether esophagitis present  K21.9       2. Chronic idiopathic constipation  K59.04       3. Hepatic steatosis  K76.0       4. Positive screening for depression on 9-item Patient Health Questionnaire (PHQ-9)  Z13.31          Brian Ortiz is a 39 year old male who presents to GI clinic for a follow up.  The patient was seen for ongoing painful acid regurgitation into his mouth, poor appetite, and burping.  EGD revealed chronic gastritis.  No H. pylori, metaplasia or dysplasia.  He was started on pantoprazole.  Reported resolution of his symptoms, would like to continue the medication.  Reminded the patient to take MiraLAX every day for constipation.    Patient reported eating only 1 meal a day due to depression. He denies SI or plans. Suggested to reach out to  his mental health provider for assistance and symptoms management.   Depression Screening Follow-up        6/18/2025     2:20 PM   PHQ   Q9: Thoughts of better off dead/self-harm past 2 weeks Several days       Follow Up Actions Taken  Crisis resource information provided in the After Visit Summary  Patient declined referral.  Discussed the following ways the patient can remain in a safe environment:  be around others.  I have reviewed the results of the [place] Screening and proposed plan of care. The patient agrees with the follow up and safety plan.    Patient verbalized understanding and appreciation of care provided. Stated that all of the questions were answered to her/his satisfaction.  Follow up as needed  This note was created with Dragon voice recognition software. Inadvertent minor typographic errors may occur in transcription. Feel free to contact the provider if you have any questions.  I sincerely appreciate an opportunity to provide consultation for this pleasant patient.    JEFFREY Miles  [facility],  Gastroenterology,  [place], [place]

## 2025-06-18 ENCOUNTER — VIRTUAL VISIT (OUTPATIENT)
Dept: GASTROENTEROLOGY | Facility: CLINIC | Age: 40
End: 2025-06-18
Attending: NURSE PRACTITIONER
Payer: MEDICAID

## 2025-06-18 DIAGNOSIS — K21.9 GASTROESOPHAGEAL REFLUX DISEASE, UNSPECIFIED WHETHER ESOPHAGITIS PRESENT: Primary | ICD-10-CM

## 2025-06-18 DIAGNOSIS — K59.04 CHRONIC IDIOPATHIC CONSTIPATION: ICD-10-CM

## 2025-06-18 DIAGNOSIS — Z13.31 POSITIVE SCREENING FOR DEPRESSION ON 9-ITEM PATIENT HEALTH QUESTIONNAIRE (PHQ-9): ICD-10-CM

## 2025-06-18 DIAGNOSIS — K76.0 HEPATIC STEATOSIS: ICD-10-CM

## 2025-06-18 PROCEDURE — 98006 SYNCH AUDIO-VIDEO EST MOD 30: CPT | Performed by: NURSE PRACTITIONER

## 2025-06-18 NOTE — NURSING NOTE
Depression Response    Patient completed the PHQ-9 assessment for depression and scored >9? Yes  Question 9 on the PHQ-9 was positive for suicidality? Yes  Does patient have current mental health provider? Yes    Is this a virtual visit? Yes   Does patient have suicidal ideation (positive question 9)? Yes (adult) - transfer to Red Flag Triage ([phone]) Patient declined transfer.  Notify provider.  Patient declines a call from an RN as he follows with Elijah Valle PSY.D. at [facility].  I personally notified the following: visit provider  Richelle Ye cma.....6/18/2025 at 2:39 PM

## 2025-06-18 NOTE — PATIENT INSTRUCTIONS
"It was a pleasure taking care of you today.  I've included a brief summary of our discussion and care plan from today's visit below.  Please review this information with your primary care provider.  ______________________________________________________________________    My recommendations are summarized as follows:    Continue to take pantoprazole daily.    2.  Try to remember taking MiraLAX every day.    Return to GI Clinic as needed   ______________________________________________________________________    Gastroesophageal reflux  Gastroesophageal reflux, also called \"acid reflux,\" occurs when the stomach contents back up into the esophagus and/or mouth. Occasional reflux is normal and can happen in healthy infants, children, and adults, most often after eating a large meal. Most episodes are brief and do not cause bothersome symptoms or complications.   By contrast, people with gastroesophageal reflux disease (GERD) experience bothersome symptoms or damage to the esophagus as a result of acid reflux. Symptoms of GERD can include heartburn, regurgitation, and difficulty or pain with swallowing.  The main cause of GERD is a transient relaxation or weakening of the lower esophageal sphincter (LES) which allows regurgitation of gastric acid and other gastric contents, including bile, back into the esophagus. The esophageal lining is susceptible to irritation by acid because it does not have the thick mucus protection of the stomach. Some people with GERD do not experience heartburn but may have burning sensation in the mouth, a feeling that food is stuck at any level of the esophagus, or hoarseness in the morning.  There are a number of predisposing factors associated with GERD, including a hiatal hernia, cigarette smoking, alcohol use, being overweight or obese, and pregnancy. Foods such as citrus fruits, chocolate, caffeinated drinks, fried foods, garlic, onions, spicy foods, and tomato-based foods, such as " chili, pizza, and spaghetti sauce, are associated with heartburn symptoms. Consumption of large high-fat meals requires prolonged gastric passage times and the increased stomach pressure may lead to movement of hydrochloric acid from the stomach into the esophagus. Additionally, lying prone after a meal promotes backflow of stomach contents and the development of symptoms.      Lifestyle modifications for gastroesophageal reflux disease (GERD).   1. Change your eating habits.  -- It's best to eat several small meals instead of two or three large meals.  -- After you eat, wait 2 to 3 hours before you lie down. Late-night snacks aren't a good idea.   -- Chocolate, mint, and alcohol can make GERD worse. They relax the valve between the esophagus and the stomach.  -- Spicy foods, foods that have a lot of acid (like tomatoes and oranges), foods with high fat content, and coffee can make GERD symptoms worse in some people. If your symptoms are worse after you eat certain foods, try to avoid them.     2. Do not smoke or chew tobacco. Saliva helps to neutralize refluxed acid, and smoking reduces the amount of saliva in the mouth and throat. Smoking also lowers the pressure in the lower esophageal sphincter and provokes coughing, causing frequent episodes of acid reflux in the esophagus.     3. If you have GERD symptoms at night, raise the head of your bed 6 in. (15 cm) to 8 in. (20 cm) by putting the frame on blocks or placing a foam wedge under the head of your mattress. (Adding extra pillows does not work.)    4. Avoid or reduce pressure on your stomach. Don't wear tight clothing around your middle.    5. Lose weight if you need to. Losing just 5 to 10 pounds can help.    6.Try diaphragmatic (belly) breathing. Research has indicated that people with GERD who practice belly breathing after eating reduce how often they experience acid reflux. Diaphragmatic breathing will reduce pressure within the stomach and increases tone  of lower esophageal sphincter.  Practice these breathing exercises 10 times each. Try to do your exercises 3 to 4 times each day. You can lie on your back or sit up straight in a chair to do these exercises.  ?Diaphragmatic breathing :  Place 1 hand over your abdomen and the other on your chest.  Slowly take a deep breath in through your nose. When you do this, think about your breath moving the hand on your abdomen out. This pulls more air into your lungs. The hand on your chest should not move very much if you are breathing the right way.  Breathe out slowly through pursed lips. Gently press on your belly as you breathe out. This will push up on your diaphragm to help get your air out.  Repeat.      ____________________________________________________________________  Please see below for any additional questions and scheduling guidelines.    Sign up for [employer]: [employer] patient portal serves as a secure platform for accessing your medical records from the [facility]. Additionally, [employer] facilitates easy, timely, and secure messaging with your care team. If you have not signed up, you may do so by using the provided code or calling [phone].    Coordinating your care after your visit:  There are multiple options for scheduling your follow-up care based on your provider's recommendation.    How do I schedule a follow-up clinic appointment:   After your appointment, you may receive scheduling assistance with the Clinic Coordinators by having a seat in the waiting room and a Clinic Coordinator will call you up to schedule.  Virtual visits or after you leave the clinic:  Your provider has placed a follow-up order in the [employer] portal for scheduling your return appointment. A member of the scheduling team will contact you to schedule.  [employer] Scheduling: Timely scheduling through [employer] is advised to ensure appointment availability.   Call to schedule: You may schedule your follow-up  appointment(s) by calling [phone], option 1.    How do I schedule my endoscopy or colonoscopy procedure:  If a procedure, such as a colonoscopy or upper endoscopy was ordered by your provider, the scheduling team will contact you to schedule this procedure. Or you may choose to call to schedule at   [phone], option 2.  Please allow 20-30 minutes when scheduling a procedure.    How do I get my blood work done? To get your blood work done, you need to schedule a lab appointment at an [facility]. There are multiple ways to schedule:   At the clinic: The Clinic Coordinator you meet after your visit can help you schedule a lab appointment.   [employer] scheduling: [employer] offers online lab scheduling at all [facility] laboratory locations.   Call to schedule: You can call [phone] to schedule your lab appointment.    How do I schedule my imaging study: To schedule imaging studies, such as CT scans, ultrasounds, MRIs, or X-rays, contact Imaging Services at [phone].    How do I schedule a referral to another doctor: If your provider recommended a referral to another specialist(s), the referral order was placed by your provider. You will receive a phone call to schedule this referral, or you may choose to call the number attached to the referral to self-schedule.    For Post-Visit Question(s):  For any inquiries following today's visit:  Please utilize [employer] messaging and allow 48 hours for reply or contact the Call Center during normal business hours at [phone], option 3.  For Emergent After-hours questions, contact the On-Call GI Fellow through the [facility]  at [phone].  In addition, you may contact your Nurse directly using the provided contact information.    Test Results:  Test results will be accessible via [employer] in compliance with the 21st Century Cures Act. This means that your results will be available to you at the same time as your  provider. Often you may see your results before your provider does. Results are reviewed by staff within two weeks with communication follow-up. Results may be released in the patient portal prior to your care team review.    Prescription Refill(s):  Medication prescribed by your provider will be addressed during your visit. For future refills, please coordinate with your pharmacy. If you have not had a recent clinic visit or routine labs, for your safety, your provider may not be able to refill your prescription.     Sincerely,  JEFFREY Miles,  [facility],  Division of Gastroenterology   ([facility])

## (undated) DEVICE — ENDO TRAP POLYP E-TRAP 00711099

## (undated) DEVICE — TUBING SUCTION 12"X1/4" N612

## (undated) DEVICE — KIT ENDO TURNOVER/PROCEDURE CARRY-ON 101822

## (undated) DEVICE — ENDO SNARE POLYPECTOMY OVAL 25MM LOOP SD-240U-25

## (undated) DEVICE — LUBRICATING JELLY 4.25OZ